# Patient Record
Sex: FEMALE | Race: WHITE | NOT HISPANIC OR LATINO | Employment: STUDENT | URBAN - METROPOLITAN AREA
[De-identification: names, ages, dates, MRNs, and addresses within clinical notes are randomized per-mention and may not be internally consistent; named-entity substitution may affect disease eponyms.]

---

## 2017-01-03 RX ORDER — DEXTROAMPHETAMINE SACCHARATE, AMPHETAMINE ASPARTATE MONOHYDRATE, DEXTROAMPHETAMINE SULFATE AND AMPHETAMINE SULFATE 5; 5; 5; 5 MG/1; MG/1; MG/1; MG/1
20 CAPSULE, EXTENDED RELEASE ORAL EVERY MORNING
Qty: 30 CAPSULE | Refills: 0 | OUTPATIENT
Start: 2017-01-03

## 2017-01-03 RX ORDER — DEXTROAMPHETAMINE SACCHARATE, AMPHETAMINE ASPARTATE MONOHYDRATE, DEXTROAMPHETAMINE SULFATE AND AMPHETAMINE SULFATE 2.5; 2.5; 2.5; 2.5 MG/1; MG/1; MG/1; MG/1
10 CAPSULE, EXTENDED RELEASE ORAL 2 TIMES DAILY
Qty: 90 CAPSULE | Refills: 0 | Status: SHIPPED | OUTPATIENT
Start: 2017-01-03 | End: 2017-02-07 | Stop reason: SDUPTHER

## 2017-01-03 NOTE — TELEPHONE ENCOUNTER
----- Message from Wendi Degroot sent at 1/3/2017  9:31 AM CST -----  Contact: Self  x  1st Request  _  2nd Request  _  3rd Request    Please refill the medication(s) listed below. Please call the patient when the prescription(s) is ready for  at the phone number [phone])([phone] .    Medication #1 ADDERALL       Preferred Pharmacy: [facility] at [phone]

## 2017-01-04 ENCOUNTER — TELEPHONE (OUTPATIENT)
Dept: INTERNAL MEDICINE | Facility: CLINIC | Age: 22
End: 2017-01-04

## 2017-01-04 NOTE — TELEPHONE ENCOUNTER
----- Message from Wendi Degroot sent at 1/4/2017  1:05 PM CST -----  Contact: Self  X   1st Request  _  2nd Request  _  3rd Request        Who: FABIOLA VALENCIA [[id number]]    Why: Pt states she is returning a call to the clinical team regarding her medications. Please call pt, thanks!    What Number to Call Back: [phone]    When to Expect a call back: (Before the end of the day)   -- if call after 3:00 call back will be tomorrow.    4:34 PM   Left message for patient returning phone call.

## 2017-01-05 ENCOUNTER — TELEPHONE (OUTPATIENT)
Dept: INTERNAL MEDICINE | Facility: CLINIC | Age: 22
End: 2017-01-05

## 2017-01-05 NOTE — TELEPHONE ENCOUNTER
----- Message from Maryann Vu sent at 1/5/2017  9:21 AM CST -----  Contact: FABIOLA VALENCIA [[id number]]  x_  1st Request  _  2nd Request  _  3rd Request    Please refill the medication(s) listed below. Please call the patient when the prescription(s) is ready for  at the phone number [phone]. Pharmacy says they haven't received medication.     Medication #1 dextroamphetamine-amphetamine (ADDERALL XR) 10 MG 24 hr capsule    Preferred Pharmacy:  [facility] [address] [place] [address] [address] [facility] [place] [address] [address]  [facility] [address]  Phone: [phone] Fax: [phone]

## 2017-01-13 ENCOUNTER — PATIENT MESSAGE (OUTPATIENT)
Dept: OBSTETRICS AND GYNECOLOGY | Facility: CLINIC | Age: 22
End: 2017-01-13

## 2017-01-13 ENCOUNTER — PATIENT MESSAGE (OUTPATIENT)
Dept: INTERNAL MEDICINE | Facility: CLINIC | Age: 22
End: 2017-01-13

## 2017-01-13 RX ORDER — FLUTICASONE PROPIONATE 50 MCG
1 SPRAY, SUSPENSION (ML) NASAL DAILY
Qty: 16 G | Refills: 11 | Status: SHIPPED | OUTPATIENT
Start: 2017-01-13 | End: 2017-05-09

## 2017-02-07 ENCOUNTER — PATIENT MESSAGE (OUTPATIENT)
Dept: OBSTETRICS AND GYNECOLOGY | Facility: CLINIC | Age: 22
End: 2017-02-07

## 2017-02-07 RX ORDER — DEXTROAMPHETAMINE SACCHARATE, AMPHETAMINE ASPARTATE MONOHYDRATE, DEXTROAMPHETAMINE SULFATE AND AMPHETAMINE SULFATE 2.5; 2.5; 2.5; 2.5 MG/1; MG/1; MG/1; MG/1
10 CAPSULE, EXTENDED RELEASE ORAL 2 TIMES DAILY
Qty: 90 CAPSULE | Refills: 0 | Status: SHIPPED | OUTPATIENT
Start: 2017-02-07 | End: 2017-02-08 | Stop reason: SDUPTHER

## 2017-02-08 RX ORDER — DEXTROAMPHETAMINE SACCHARATE, AMPHETAMINE ASPARTATE MONOHYDRATE, DEXTROAMPHETAMINE SULFATE AND AMPHETAMINE SULFATE 2.5; 2.5; 2.5; 2.5 MG/1; MG/1; MG/1; MG/1
10 CAPSULE, EXTENDED RELEASE ORAL 2 TIMES DAILY
Qty: 90 CAPSULE | Refills: 0 | Status: SHIPPED | OUTPATIENT
Start: 2017-02-08 | End: 2017-03-07 | Stop reason: SDUPTHER

## 2017-02-08 RX ORDER — DEXTROAMPHETAMINE SACCHARATE, AMPHETAMINE ASPARTATE MONOHYDRATE, DEXTROAMPHETAMINE SULFATE AND AMPHETAMINE SULFATE 2.5; 2.5; 2.5; 2.5 MG/1; MG/1; MG/1; MG/1
10 CAPSULE, EXTENDED RELEASE ORAL 2 TIMES DAILY
Qty: 90 CAPSULE | Refills: 0 | Status: SHIPPED | OUTPATIENT
Start: 2017-02-08 | End: 2017-02-08 | Stop reason: SDUPTHER

## 2017-02-20 ENCOUNTER — PATIENT MESSAGE (OUTPATIENT)
Dept: OBSTETRICS AND GYNECOLOGY | Facility: CLINIC | Age: 22
End: 2017-02-20

## 2017-03-06 ENCOUNTER — PATIENT MESSAGE (OUTPATIENT)
Dept: INTERNAL MEDICINE | Facility: CLINIC | Age: 22
End: 2017-03-06

## 2017-03-07 ENCOUNTER — PATIENT MESSAGE (OUTPATIENT)
Dept: OBSTETRICS AND GYNECOLOGY | Facility: CLINIC | Age: 22
End: 2017-03-07

## 2017-03-07 RX ORDER — DEXTROAMPHETAMINE SACCHARATE, AMPHETAMINE ASPARTATE MONOHYDRATE, DEXTROAMPHETAMINE SULFATE AND AMPHETAMINE SULFATE 2.5; 2.5; 2.5; 2.5 MG/1; MG/1; MG/1; MG/1
10 CAPSULE, EXTENDED RELEASE ORAL 2 TIMES DAILY
Qty: 90 CAPSULE | Refills: 0 | Status: CANCELLED | OUTPATIENT
Start: 2017-03-07

## 2017-03-07 RX ORDER — DEXTROAMPHETAMINE SACCHARATE, AMPHETAMINE ASPARTATE MONOHYDRATE, DEXTROAMPHETAMINE SULFATE AND AMPHETAMINE SULFATE 2.5; 2.5; 2.5; 2.5 MG/1; MG/1; MG/1; MG/1
10 CAPSULE, EXTENDED RELEASE ORAL 2 TIMES DAILY
Qty: 90 CAPSULE | Refills: 0 | Status: SHIPPED | OUTPATIENT
Start: 2017-03-07 | End: 2017-03-10 | Stop reason: SDUPTHER

## 2017-03-08 NOTE — TELEPHONE ENCOUNTER
Patient is requesting a refill on her birth control due to not liking the generic that was given. I informed her that we can not insure that the particular generic that she will be given is the one she is use to taking.

## 2017-03-09 ENCOUNTER — PATIENT MESSAGE (OUTPATIENT)
Dept: OBSTETRICS AND GYNECOLOGY | Facility: CLINIC | Age: 22
End: 2017-03-09

## 2017-03-10 RX ORDER — DEXTROAMPHETAMINE SACCHARATE, AMPHETAMINE ASPARTATE MONOHYDRATE, DEXTROAMPHETAMINE SULFATE AND AMPHETAMINE SULFATE 2.5; 2.5; 2.5; 2.5 MG/1; MG/1; MG/1; MG/1
10 CAPSULE, EXTENDED RELEASE ORAL 2 TIMES DAILY
Qty: 90 CAPSULE | Refills: 0 | Status: SHIPPED | OUTPATIENT
Start: 2017-03-10 | End: 2017-04-11 | Stop reason: SDUPTHER

## 2017-03-15 ENCOUNTER — HOSPITAL ENCOUNTER (OUTPATIENT)
Dept: RADIOLOGY | Facility: OTHER | Age: 22
Discharge: HOME | End: 2017-03-15
Attending: OTOLARYNGOLOGY
Payer: COMMERCIAL

## 2017-03-15 DIAGNOSIS — G43.009: ICD-10-CM

## 2017-03-15 PROCEDURE — 70486 CT MAXILLOFACIAL W/O DYE: CPT | Mod: TC

## 2017-03-15 PROCEDURE — 70486 CT MAXILLOFACIAL W/O DYE: CPT | Mod: 26,,, | Performed by: RADIOLOGY

## 2017-04-11 RX ORDER — AMOXICILLIN AND CLAVULANATE POTASSIUM 875; 125 MG/1; MG/1
1 TABLET, FILM COATED ORAL EVERY 12 HOURS
Refills: 0 | COMMUNITY
Start: 2017-02-17 | End: 2017-05-09

## 2017-04-11 RX ORDER — NAPROXEN 500 MG/1
TABLET ORAL
Refills: 1 | COMMUNITY
Start: 2017-02-13 | End: 2017-05-09

## 2017-04-11 RX ORDER — PREDNISONE 20 MG/1
TABLET ORAL
Refills: 0 | COMMUNITY
Start: 2017-02-17 | End: 2017-05-09

## 2017-04-11 RX ORDER — MONTELUKAST SODIUM 10 MG/1
TABLET ORAL
Refills: 1 | COMMUNITY
Start: 2017-02-23 | End: 2018-05-10

## 2017-04-12 RX ORDER — DEXTROAMPHETAMINE SACCHARATE, AMPHETAMINE ASPARTATE MONOHYDRATE, DEXTROAMPHETAMINE SULFATE AND AMPHETAMINE SULFATE 2.5; 2.5; 2.5; 2.5 MG/1; MG/1; MG/1; MG/1
10 CAPSULE, EXTENDED RELEASE ORAL 2 TIMES DAILY
Qty: 90 CAPSULE | Refills: 0 | Status: SHIPPED | OUTPATIENT
Start: 2017-04-12 | End: 2017-05-09

## 2017-04-25 RX ORDER — CETIRIZINE HYDROCHLORIDE 10 MG/1
TABLET ORAL
Refills: 5 | COMMUNITY
Start: 2017-03-21 | End: 2019-06-21

## 2017-05-09 ENCOUNTER — OFFICE VISIT (OUTPATIENT)
Dept: INTERNAL MEDICINE | Facility: CLINIC | Age: 22
End: 2017-05-09
Attending: FAMILY MEDICINE
Payer: COMMERCIAL

## 2017-05-09 ENCOUNTER — OFFICE VISIT (OUTPATIENT)
Dept: OBSTETRICS AND GYNECOLOGY | Facility: CLINIC | Age: 22
End: 2017-05-09
Attending: OBSTETRICS & GYNECOLOGY
Payer: COMMERCIAL

## 2017-05-09 VITALS
WEIGHT: 130.06 LBS | SYSTOLIC BLOOD PRESSURE: 124 MMHG | TEMPERATURE: 99 F | OXYGEN SATURATION: 100 % | BODY MASS INDEX: 20.41 KG/M2 | DIASTOLIC BLOOD PRESSURE: 68 MMHG | HEART RATE: 80 BPM | HEIGHT: 67 IN

## 2017-05-09 VITALS
HEIGHT: 67 IN | DIASTOLIC BLOOD PRESSURE: 82 MMHG | BODY MASS INDEX: 20.31 KG/M2 | SYSTOLIC BLOOD PRESSURE: 118 MMHG | WEIGHT: 129.44 LBS

## 2017-05-09 DIAGNOSIS — Z71.84 TRAVEL ADVICE ENCOUNTER: ICD-10-CM

## 2017-05-09 DIAGNOSIS — Z00.00 ANNUAL PHYSICAL EXAM: Primary | ICD-10-CM

## 2017-05-09 DIAGNOSIS — F98.8 ADD (ATTENTION DEFICIT DISORDER): ICD-10-CM

## 2017-05-09 DIAGNOSIS — Z30.41 ENCOUNTER FOR SURVEILLANCE OF CONTRACEPTIVE PILLS: ICD-10-CM

## 2017-05-09 DIAGNOSIS — Z01.419 WELL WOMAN EXAM: Primary | ICD-10-CM

## 2017-05-09 PROCEDURE — 99395 PREV VISIT EST AGE 18-39: CPT | Mod: S$GLB,,, | Performed by: OBSTETRICS & GYNECOLOGY

## 2017-05-09 PROCEDURE — 88175 CYTOPATH C/V AUTO FLUID REDO: CPT

## 2017-05-09 PROCEDURE — 99395 PREV VISIT EST AGE 18-39: CPT | Mod: S$GLB,,, | Performed by: FAMILY MEDICINE

## 2017-05-09 PROCEDURE — 99999 PR PBB SHADOW E&M-EST. PATIENT-LVL II: CPT | Mod: PBBFAC,,, | Performed by: OBSTETRICS & GYNECOLOGY

## 2017-05-09 PROCEDURE — 99999 PR PBB SHADOW E&M-EST. PATIENT-LVL III: CPT | Mod: PBBFAC,,, | Performed by: FAMILY MEDICINE

## 2017-05-09 RX ORDER — CIPROFLOXACIN 500 MG/1
500 TABLET ORAL 2 TIMES DAILY
Qty: 20 TABLET | Refills: 0 | Status: SHIPPED | OUTPATIENT
Start: 2017-05-09 | End: 2017-05-19

## 2017-05-09 RX ORDER — DEXMETHYLPHENIDATE HYDROCHLORIDE 30 MG/1
30 CAPSULE, EXTENDED RELEASE ORAL DAILY
Qty: 30 CAPSULE | Refills: 0 | Status: SHIPPED | OUTPATIENT
Start: 2017-05-09 | End: 2017-05-11 | Stop reason: SDUPTHER

## 2017-05-09 RX ORDER — BUTALBITAL, ASPIRIN, AND CAFFEINE 325; 50; 40 MG/1; MG/1; MG/1
1 CAPSULE ORAL EVERY 6 HOURS PRN
Qty: 30 CAPSULE | Refills: 0 | Status: SHIPPED | OUTPATIENT
Start: 2017-05-09 | End: 2017-06-08

## 2017-05-09 RX ORDER — LANOLIN ALCOHOL/MO/W.PET/CERES
400 CREAM (GRAM) TOPICAL NIGHTLY
Qty: 90 TABLET | Refills: 1 | Status: SHIPPED | OUTPATIENT
Start: 2017-05-09 | End: 2018-05-10

## 2017-05-09 RX ORDER — FLUCONAZOLE 150 MG/1
150 TABLET ORAL ONCE
Qty: 2 TABLET | Refills: 0 | Status: SHIPPED | OUTPATIENT
Start: 2017-05-09 | End: 2017-05-09

## 2017-05-09 NOTE — MR AVS SNAPSHOT
[facility] [address]  [address]  [facility] [address]  Phone: [phone]  Fax: [phone]                  Melinda Mccann   2017 9:30 AM   Office Visit    Description:  Female : 1995   Provider:  Marleni Armas DO   Department:  [facility] [address]           Reason for Visit     Well Woman                To Do List           Future Appointments        Provider Department Dept Phone    2017 11:00 AM Kenia Greco MD [facility] Internal Medicine [phone]      Goals (5 Years of Data)     None      [facility]     [facility] Nurse Care Line -  Assistance  Unless otherwise directed by your provider, please contact [facility], our nurse care line that is available for  assistance.     Registered nurses in the [facility] Call Center provide: appointment scheduling, clinical advisement, health education, and other advisory services.  Call: [phone] (toll free)               Medications           Message regarding Medications     Verify the changes and/or additions to your medication regime listed below are the same as discussed with your clinician today.  If any of these changes or additions are incorrect, please notify your healthcare provider.        STOP taking these medications     amoxicillin-clavulanate 875-125mg (AUGMENTIN) 875-125 mg per tablet Take 1 tablet by mouth every 12 (twelve) hours.    fluticasone (FLONASE) 50 mcg/actuation nasal spray 1 spray by Each Nare route once daily.    naproxen (NAPROSYN) 500 MG tablet TAKE ONE TABLET BY MOUTH TWICE DAILY AS NEEDED FOR PAIN OR HEADACHE    oxycodone-acetaminophen (PERCOCET) 5-325 mg per tablet Take 1 tablet by mouth every 4 to 6 hours as needed for Pain.    predniSONE (DELTASONE) 20 MG tablet TAKE 1 TABLET BY MOUTH TWICE A DAY FOR 2 DAYS, THEN 1/2 TABLET TWICE A DAY FOR 2 DAYS, THEN 1/2 TABLET DAILY FOR 2 DAYS. TAKE WITH MEALS.    sulfacetamide sodium 10% (BLEPH-10)  "10 % ophthalmic solution            Verify that the below list of medications is an accurate representation of the medications you are currently taking.  If none reported, the list may be blank. If incorrect, please contact your healthcare provider. Carry this list with you in case of emergency.           Current Medications     cetirizine (ZYRTEC) 10 MG tablet TK 1 T PO QD    dextroamphetamine-amphetamine (ADDERALL XR) 10 MG 24 hr capsule Take 1 capsule (10 mg total) by mouth 2 (two) times daily. Two tablets in am and 1 in the afternoon    montelukast (SINGULAIR) 10 mg tablet TK 1 T PO QD    norethindrone-e.estradiol-iron 1 mg-20 mcg (24)/75 mg (4) Oral per tablet Take 1 tablet by mouth once daily.    sumatriptan (IMITREX) 25 MG Tab Take 1 tablet (25 mg total) by mouth every 8 (eight) hours as needed.           Clinical Reference Information           Your Vitals Were     BP Height Weight Last Period BMI    118/82 5' 7" (1.702 m) 58.7 kg (129 lb 6.6 oz) 05/03/2017 20.27 kg/m2      Blood Pressure          Most Recent Value    BP  118/82      Allergies as of 5/9/2017     No Known Allergies      Immunizations Administered on Date of Encounter - 5/9/2017     None      Language Assistance Services     ATTENTION: Language assistance services are available, free of charge. Please call [phone].      ATENCIÓN: Si habla shannan, tiene a hastings disposición servicios gratuitos de asistencia lingüística. Llame al [phone].     CHÚ Ý: N?u b?n nói Ti?ng Vi?t, có các d?ch v? h? tr? ngôn ng? mi?n phí dành cho b?n. G?i s? [phone].         [facility] [address] complies with applicable Federal civil rights laws and does not discriminate on the basis of race, color, national origin, age, disability, or sex.        "

## 2017-05-09 NOTE — PROGRESS NOTES
"CC: Well woman exam    Melinda Mccann is a 22 y.o. female  presents for well woman exam.  LMP: Patient's last menstrual period was 2017..  No issues, problems, or complaints.  Currently using loestrin, without problems, still planning to apply to [employer] school.  Going to [place] for study abroad this summer, discussed ppx abx and personal safety.  Discussed pap this year, has had gardasil, declines STD testing at this time.    Past Medical History:   Diagnosis Date    Accessory navicular bone of foot     right    ADD (attention deficit disorder)     Cancer     Dysplastic nevus     Still's murmur      Past Surgical History:   Procedure Laterality Date    COLONOSCOPY N/A 2016    Procedure: COLONOSCOPY;  Surgeon: ROSY Ferguson MD;  Location: [address];  Service: Endoscopy;  Laterality: N/A;    WISDOM TOOTH EXTRACTION       Social History     Social History    Marital status: Single     Spouse name: N/A    Number of children: N/A    Years of education: N/A     Occupational History    hospital scribe      Social History Main Topics    Smoking status: Never Smoker    Smokeless tobacco: Never Used    Alcohol use 0.0 oz/week     0 Standard drinks or equivalent per week    Drug use: No    Sexual activity: Yes     Partners: Male     Birth control/ protection: Condom, OCP     Other Topics Concern    Are You Pregnant Or Think You May Be? No    Breast-Feeding No     Social History Narrative     Family History   Problem Relation Age of Onset    Mitral valve prolapse Mother     Stroke Maternal Aunt      maternal great aunt    Breast cancer Neg Hx     Colon cancer Neg Hx     Ovarian cancer Neg Hx     Diabetes Neg Hx     Hypertension Neg Hx     Melanoma Neg Hx      OB History      Para Term  AB TAB SAB Ectopic Multiple Living    0 0 0 0 0 0 0 0 0 0          /82  Ht 5' 7" (1.702 m)  Wt 58.7 kg (129 lb 6.6 oz)  LMP 2017  BMI 20.27 " kg/m2      ROS:  GENERAL: Denies weight gain or weight loss. Feeling well overall.   SKIN: Denies rash or lesions.   HEAD: Denies head injury or headache.   NODES: Denies enlarged lymph nodes.   CHEST: Denies chest pain or shortness of breath.   CARDIOVASCULAR: Denies palpitations or left sided chest pain.   ABDOMEN: No abdominal pain, constipation, diarrhea, nausea, vomiting or rectal bleeding.   URINARY: No frequency, dysuria, hematuria, or burning on urination.  REPRODUCTIVE: See HPI.   BREASTS: The patient performs breast self-examination and denies pain, lumps, or nipple discharge.   HEMATOLOGIC: No easy bruisability or excessive bleeding.   MUSCULOSKELETAL: Denies joint pain or swelling.   NEUROLOGIC: Denies syncope or weakness.   PSYCHIATRIC: Denies depression, anxiety or mood swings.    PHYSICAL EXAM:  APPEARANCE: Well nourished, well developed, in no acute distress.  AFFECT: WNL, alert and oriented x 3  SKIN: No acne or hirsutism  NECK: Neck symmetric without masses or thyromegaly  NODES: No inguinal, cervical, axillary, or femoral lymph node enlargement  CHEST: Good respiratory effect  ABDOMEN: Soft.  No tenderness or masses.  No hepatosplenomegaly.  No hernias.  BREASTS: Symmetrical, no skin changes or visible lesions.  No palpable masses, nipple discharge bilaterally.  PELVIC: Normal external genitalia without lesions.  Normal hair distribution.  Adequate perineal body, normal urethral meatus.  Vagina moist and well rugated without lesions or discharge.  Cervix pink, without lesions, discharge or tenderness.  No significant cystocele or rectocele.  Bimanual exam shows uterus to be normal size, regular, mobile and nontender.  Adnexa without masses or tenderness.    EXTREMITIES: No edema.    Well woman exam  -     Liquid-based pap smear, screening    Encounter for surveillance of contraceptive pills  -     norethindrone-e.estradiol-iron 1 mg-20 mcg (24)/75 mg (4) Oral per tablet; Take 1 tablet by mouth  once daily.  Dispense: 60 tablet; Refill: 11    Travel advice encounter  -     fluconazole (DIFLUCAN) 150 MG Tab; Take 1 tablet (150 mg total) by mouth once.  Dispense: 2 tablet; Refill: 0  -     ciprofloxacin HCl (CIPRO) 500 MG tablet; Take 1 tablet (500 mg total) by mouth 2 (two) times daily.  Dispense: 20 tablet; Refill: 0            Patient was counseled today on A.C.S. Pap guidelines and recommendations for yearly pelvic exams, mammograms and monthly self breast exams; to see her PCP for other health maintenance.     No Follow-up on file.

## 2017-05-09 NOTE — PROGRESS NOTES
"Subjective:      Patient ID: Melinda Mccann is a 22 y.o. female.    Chief Complaint: Annual Exam    HPI Comments: She will start allergy shots in 3-4 months. Her adderall is not keeping her even throughout the day with concentration. She is having headaches with her allergies. She has not been eating as much. In the last few weeks mood has been good, anxiety level ok, no panic attacks.     Review of Systems   Constitutional: Negative.    Respiratory: Negative.    Cardiovascular: Negative.    Gastrointestinal: Negative.    Genitourinary: Negative.    Neurological: Negative.      I personally reviewed Past Medical History, Past Surgical history,  Past Social History and Family History    Objective:   /68  Pulse 80  Temp 98.7 °F (37.1 °C) (Oral)   Ht 5' 7" (1.702 m)  Wt 59 kg (130 lb 1.1 oz)  LMP 05/03/2017  SpO2 100%  BMI 20.37 kg/m2    Physical Exam   Constitutional: She is oriented to person, place, and time. She appears well-developed and well-nourished. No distress.   HENT:   Head: Normocephalic.   Right Ear: External ear normal.   Left Ear: External ear normal.   Mouth/Throat: Oropharynx is clear and moist.   Eyes: Conjunctivae and EOM are normal. Pupils are equal, round, and reactive to light. Right eye exhibits no discharge. Left eye exhibits no discharge. No scleral icterus.   Neck: Normal range of motion. No tracheal deviation present. No thyromegaly present.   Cardiovascular: Normal rate, regular rhythm, normal heart sounds and intact distal pulses.  Exam reveals no gallop.    No murmur heard.  Pulmonary/Chest: Effort normal and breath sounds normal. No respiratory distress. She has no wheezes. She has no rales. She exhibits no tenderness.   Abdominal: Soft. Bowel sounds are normal. She exhibits no distension and no mass. There is no tenderness. There is no rebound and no guarding.   Musculoskeletal: Normal range of motion.   Neurological: She is alert and oriented to person, place, and " time. No cranial nerve deficit.   Skin: Skin is warm and dry.   Psychiatric: She has a normal mood and affect. Her behavior is normal. Judgment and thought content normal.   Vitals reviewed.      Melinda was seen today for annual exam.    Diagnoses and all orders for this visit:    Annual physical exam  - reviewed labs, no repeats are needed at this gabriela stormy    ADD (attention deficit disorder)  -will change to focalin, recommend patient call her insurance to determine what medication are on formulary if this is not covered     Travel advice encounter  -     typhoid (VIVOTIF) DR capsule; Take 1 capsule by mouth every other day.    Headaches  -will start magnesium and trial fiornal prn till allergy shots are started   -patient to call with any changes      -     Cancel: Ambulatory referral to Obstetrics / Gynecology  -     Discontinue: dexmethylphenidate (FOCALIN XR) 30 mg 24 hr capsule; Take 30 mg by mouth once daily.  -     butalbital-aspirin-caffeine -40 mg (FIORINAL) -40 mg Cap; Take 1 capsule by mouth every 6 (six) hours as needed (headaches).  -     magnesium oxide (MAG-OX) 400 mg tablet; Take 1 tablet (400 mg total) by mouth every evening.

## 2017-05-09 NOTE — MR AVS SNAPSHOT
[facility]  [address]  [facility] [address]  Phone: [phone]  Fax: [phone]                  Melinda Mccann   2017 11:00 AM   Office Visit    Description:  Female : 1995   Provider:  Kenia Greco MD   Department:  [facility]           Reason for Visit     Annual Exam           Diagnoses this Visit        Comments    Annual physical exam    -  Primary     ADD (attention deficit disorder)                To Do List           Goals (5 Years of Data)     None       These Medications        Disp Refills Start End    dexmethylphenidate (FOCALIN XR) 30 mg 24 hr capsule 30 capsule 0 2017     Take 30 mg by mouth once daily. - Oral    Pharmacy: [facility] [id number] [place] [address] [address] AT [facility] Ph #: [phone]       butalbital-aspirin-caffeine -40 mg (FIORINAL) -40 mg Cap 30 capsule 0 2017    Take 1 capsule by mouth every 6 (six) hours as needed (headaches). - Oral    Pharmacy: [facility] [id number] [place] [address] [address] AT [facility] Ph #: [phone]         [facility]     [facility] Care [facility] -  Assistance  Unless otherwise directed by your provider, please contact [facility], our nurse care line that is available for  assistance.     Registered nurses in the [facility] Center provide: appointment scheduling, clinical advisement, health education, and other advisory services.  Call: [phone] (toll free)               Medications           Message regarding Medications     Verify the changes and/or additions to your medication regime listed below are the same as discussed with your clinician today.  If any of these changes or additions are incorrect, please notify your healthcare provider.        START taking these NEW medications        Refills    dexmethylphenidate  "(FOCALIN XR) 30 mg 24 hr capsule 0    Sig: Take 30 mg by mouth once daily.    Class: Normal    Route: Oral    butalbital-aspirin-caffeine -40 mg (FIORINAL) -40 mg Cap 0    Sig: Take 1 capsule by mouth every 6 (six) hours as needed (headaches).    Class: Normal    Route: Oral      STOP taking these medications     dextroamphetamine-amphetamine (ADDERALL XR) 10 MG 24 hr capsule Take 1 capsule (10 mg total) by mouth 2 (two) times daily. Two tablets in am and 1 in the afternoon           Verify that the below list of medications is an accurate representation of the medications you are currently taking.  If none reported, the list may be blank. If incorrect, please contact your healthcare provider. Carry this list with you in case of emergency.           Current Medications     cetirizine (ZYRTEC) 10 MG tablet TK 1 T PO QD    montelukast (SINGULAIR) 10 mg tablet TK 1 T PO QD    norethindrone-e.estradiol-iron 1 mg-20 mcg (24)/75 mg (4) Oral per tablet Take 1 tablet by mouth once daily.    butalbital-aspirin-caffeine -40 mg (FIORINAL) -40 mg Cap Take 1 capsule by mouth every 6 (six) hours as needed (headaches).    ciprofloxacin HCl (CIPRO) 500 MG tablet Take 1 tablet (500 mg total) by mouth 2 (two) times daily.    dexmethylphenidate (FOCALIN XR) 30 mg 24 hr capsule Take 30 mg by mouth once daily.    fluconazole (DIFLUCAN) 150 MG Tab Take 1 tablet (150 mg total) by mouth once.    sumatriptan (IMITREX) 25 MG Tab Take 1 tablet (25 mg total) by mouth every 8 (eight) hours as needed.           Clinical Reference Information           Your Vitals Were     BP Pulse Temp Height Weight Last Period    124/68 80 98.7 °F (37.1 °C) (Oral) 5' 7" (1.702 m) 59 kg (130 lb 1.1 oz) 05/03/2017    SpO2 BMI             100% 20.37 kg/m2         Blood Pressure          Most Recent Value    BP  124/68      Allergies as of 5/9/2017     No Known Allergies      Immunizations Administered on Date of Encounter - 5/9/2017     " None      Language Assistance Services     ATTENTION: Language assistance services are available, free of charge. Please call [phone].      ATENCIÓN: Si habla shannan, tiene a hastings disposición servicios gratuitos de asistencia lingüística. Llame al [phone].     CHÚ Ý: N?u b?n nói Ti?ng Vi?t, có các d?ch v? h? tr? ngôn ng? mi?n phí dành cho b?n. G?i s? [phone].         [facility] complies with applicable Federal civil rights laws and does not discriminate on the basis of race, color, national origin, age, disability, or sex.

## 2017-05-11 RX ORDER — DEXMETHYLPHENIDATE HYDROCHLORIDE 30 MG/1
30 CAPSULE, EXTENDED RELEASE ORAL DAILY
Qty: 30 CAPSULE | Refills: 0 | Status: SHIPPED | OUTPATIENT
Start: 2017-05-11 | End: 2017-05-15

## 2017-05-11 RX ORDER — DEXMETHYLPHENIDATE HYDROCHLORIDE 30 MG/1
30 CAPSULE, EXTENDED RELEASE ORAL DAILY
Qty: 30 CAPSULE | Refills: 0 | Status: SHIPPED | OUTPATIENT
Start: 2017-05-11 | End: 2017-05-11 | Stop reason: SDUPTHER

## 2017-05-11 NOTE — TELEPHONE ENCOUNTER
Comment from pt:    Comment:   The [facility] on [address] had the medication on back order as well. I [facility] ahead this time [facility] a [facility] [address], and they have assured me that they have the medicine in stock in generic form. Would you mind sending it there? I'm so sorry for the confusion. Thank you!

## 2017-05-12 ENCOUNTER — NURSE TRIAGE (OUTPATIENT)
Dept: ADMINISTRATIVE | Facility: CLINIC | Age: 22
End: 2017-05-12

## 2017-05-12 NOTE — TELEPHONE ENCOUNTER
Pt having trouble with picking up dexmethylphenidate as the prior authorization is taking longer than expected and pt is leaving the country Monday morning. Pt is requesting to have adderall filled as her insurance will cover, until she is able to have the dexmethylphenidate filled. Please advise and authorize.

## 2017-05-12 NOTE — TELEPHONE ENCOUNTER
Reason for Disposition   General information question, no triage required and triager able to answer question    Protocols used: ST INFORMATION ONLY CALL-A-AH    Melinda called about her Focalin.  She states the insurance company told her their approval for the prior authorization may not be completed until Monday.  Melinda is going to leave [place] tomorrow to study abroad for the summer. She wants to know if she can just get Adderall instead.  Explained to her that Kenia Greco MD discontinued the Adderall yesterday, and sent the Focalin order to [facility] instead of the Adderall.  [facility] did receive the order at 1446 on 05/11/17.  Explained to her that the prior authorization is needed for insurance to pay for the medication, but if she is willing to pay for it, she can go get it tonight.  They do not keep medication from you if you don't have prior auth, they ([facility] just needs to know you will pay for it out of pocket.  She states understanding.  She said she will call [employer] to see if they will reimburse if she pays for it now.  Will call back for any additional concerns. Message to Kenia Greco MD . Please contact caller directly with any additional care advice.

## 2017-05-12 NOTE — TELEPHONE ENCOUNTER
----- Message from Neelima Vaughn sent at 5/12/2017  3:36 PM CDT -----  Contact: Pt  X_  1st Request  _  2nd Request  _  3rd Request    Who:FABIOLA VALENCIA [[id number]]    Why: Patient would like to speak with staff to check the status of her prior authorization    What Number to Call Back: [phone]    When to Expect a call back: (Before the end of the day)   -- if call after 3:00 call back will be tomorrow.    GUMARO

## 2017-05-15 ENCOUNTER — TELEPHONE (OUTPATIENT)
Dept: INTERNAL MEDICINE | Facility: CLINIC | Age: 22
End: 2017-05-15

## 2017-05-15 RX ORDER — DEXTROAMPHETAMINE SACCHARATE, AMPHETAMINE ASPARTATE MONOHYDRATE, DEXTROAMPHETAMINE SULFATE AND AMPHETAMINE SULFATE 5; 5; 5; 5 MG/1; MG/1; MG/1; MG/1
20 CAPSULE, EXTENDED RELEASE ORAL EVERY MORNING
Qty: 30 CAPSULE | Refills: 0 | OUTPATIENT
Start: 2017-05-15

## 2017-05-15 RX ORDER — DEXTROAMPHETAMINE SACCHARATE, AMPHETAMINE ASPARTATE MONOHYDRATE, DEXTROAMPHETAMINE SULFATE AND AMPHETAMINE SULFATE 5; 5; 5; 5 MG/1; MG/1; MG/1; MG/1
20 CAPSULE, EXTENDED RELEASE ORAL EVERY MORNING
Qty: 30 CAPSULE | Refills: 0 | Status: SHIPPED | OUTPATIENT
Start: 2017-05-15 | End: 2017-05-15

## 2017-05-15 RX ORDER — DEXTROAMPHETAMINE SACCHARATE, AMPHETAMINE ASPARTATE MONOHYDRATE, DEXTROAMPHETAMINE SULFATE AND AMPHETAMINE SULFATE 2.5; 2.5; 2.5; 2.5 MG/1; MG/1; MG/1; MG/1
10 CAPSULE, EXTENDED RELEASE ORAL 2 TIMES DAILY
Qty: 90 CAPSULE | Refills: 0 | Status: SHIPPED | OUTPATIENT
Start: 2017-05-15 | End: 2017-06-05

## 2017-05-15 NOTE — TELEPHONE ENCOUNTER
Informed Pt's mother ezequiel that fax has been sent, and that I personally called [employer] and verified that it had been successfully sent.

## 2017-05-15 NOTE — TELEPHONE ENCOUNTER
"----- Message from Florencia Jaramillo sent at 5/15/2017 12:02 PM CDT -----  Contact: Patient's mother / Mignon Mccann / # [phone]  _  1st Request  X  2nd Request  _  3rd Request    Who: Melinda Mccann (mrn# [id number])    Why:  Patient's mother called requesting a call.  Says, "[employer] faxed over to this office a prior authorization along with a survey to be completed and faxed right back."  Please give patient's mother a call back at your earliest convenience confirming this has been done.    THANKS!    What Number to Call Back: [phone]    When to Expect a call back: (Before the end of the day)   -- if the call is after 12:00, the call back will be tomorrow.    2:14 PM  Informed patient's mother that fax was sent for patient's ADD medication FOCALIN                      "

## 2017-05-15 NOTE — TELEPHONE ENCOUNTER
"----- Message from Florencia Jaramillo sent at 5/15/2017 12:02 PM CDT -----  Contact: Patient's mother / Mignon Mccann / # [phone]  _  1st Request  X  2nd Request  _  3rd Request    Who: Melinda Mccann (mrn# [id number])    Why:  Patient's mother called requesting a call.  Says, "[employer] faxed over to this office a prior authorization along with a survey to be completed and faxed right back."  Please give patient's mother a call back at your earliest convenience confirming this has been done.    THANKS!    What Number to Call Back: [phone]    When to Expect a call back: (Before the end of the day)   -- if the call is after 12:00, the call back will be tomorrow.                        "

## 2017-05-15 NOTE — TELEPHONE ENCOUNTER
Contacted [employer]. They have received prior authorization. It has been marked as urgent and will be processed within 24 hours.

## 2017-05-15 NOTE — TELEPHONE ENCOUNTER
----- Message from Susanne Hill sent at 5/15/2017  1:10 PM CDT -----  Contact: stacey [facility] [phone] ext [phone]  _  1st Request  _  2nd Request  _  3rd Request        Who: stacey [facility] [phone] ext [phone]    Why: following up with a fax that was sent on Friday. Please call stacey [facility] [phone] ext [phone]    What Number to Call Back:stacey [facility] [phone] ext [phone]    When to Expect a call back: (Before the end of the day)   -- if the call is after 12:00, the call back will be tomorrow.      2:05 PM  Attempted to contact Stacey with [employer] but was informed that the wrong number was given. Refaxed patient's Focalin PA paperwork with correct information.

## 2017-05-15 NOTE — TELEPHONE ENCOUNTER
Pt mother, ezequiel, stated that because of the prior authorization not being completed before daughter leaving the country they had to pay out of pocket. Fax has been sent for completion to [employer] as requested by pt's mother so that she may be reimbursed.

## 2017-05-16 ENCOUNTER — TELEPHONE (OUTPATIENT)
Dept: INTERNAL MEDICINE | Facility: CLINIC | Age: 22
End: 2017-05-16

## 2017-05-16 NOTE — TELEPHONE ENCOUNTER
----- Message from Susanne Hill sent at 5/16/2017 12:06 PM CDT -----  Contact: pt mom ezequiel [phone]  _  1st Request  _  2nd Request  _  3rd Request        Who: pt mom ezequiel [phone]    Why: regarding a peer to peer for dexmethylphenidate xl 30 mg. Please call the mom    What Number to Call Back:pt mom ezequiel cell [phone]    When to Expect a call back: (Before the end of the day)   -- if the call is after 12:00, the call back will be tomorrow.      3:59 PM  Left vm returning patient's mother phone call.              \

## 2017-05-16 NOTE — TELEPHONE ENCOUNTER
----- Message from Susanne Hill sent at 5/16/2017  4:54 PM CDT -----  Contact: pt's mom ezequiel   _  1st Request  _  2nd Request  _  3rd Request        Who: pt's mom ezequiel     Why: pt returned the nurse's phone call    What Number to Call Back:[phone]    When to Expect a call back: (Before the end of the day)   -- if the call is after 12:00, the call back will be tomorrow.    4:54pm  Spoke to patient's parent regarding the denial of patients FOCALIN PA and Dr. Greco's rejection to do a [id number] based on the lack of knowledge of patient's previous mental health history. Informed patient's parent that Dr. Greco recommends calling their insurance company to see which ADD medication they will approve. Parent's verbalized understanding and had no further questions.

## 2017-06-02 ENCOUNTER — PATIENT MESSAGE (OUTPATIENT)
Dept: INTERNAL MEDICINE | Facility: CLINIC | Age: 22
End: 2017-06-02

## 2017-06-07 ENCOUNTER — TELEPHONE (OUTPATIENT)
Dept: INTERNAL MEDICINE | Facility: CLINIC | Age: 22
End: 2017-06-07

## 2017-06-07 DIAGNOSIS — F98.8 ADD (ATTENTION DEFICIT DISORDER): Primary | ICD-10-CM

## 2017-06-07 RX ORDER — METHYLPHENIDATE HYDROCHLORIDE EXTENDED RELEASE 20 MG/1
20 TABLET ORAL 2 TIMES DAILY
Qty: 60 TABLET | Refills: 0 | Status: SHIPPED | OUTPATIENT
Start: 2017-06-07 | End: 2017-07-26 | Stop reason: ALTCHOICE

## 2017-06-07 NOTE — TELEPHONE ENCOUNTER
Please inform methylphenidate has been sent to pharmacy   Please inform she call to make a psychiatry appointment for further management/evaluation for ADD, appointments are booking far out and we will need this follow up today if this medication is ineffective    Call to make an appointment within [phone]  Neelima Friedman(psychiatrist) [address] Phone: [phone]  Celso Albrecht (psychiatrist) [phone] [address] [facility]

## 2017-06-09 NOTE — TELEPHONE ENCOUNTER
Pt originally was prescribed adderall. Pt's insurance did not approve the rx even with a prior authorization and suggested methylphenidate ER or Vyvanse. Pt was then prescribed methylphenidate but was unable to fill the rx as the pharmacies the pt tried to have it filled at did not carry the medication and could not tell pt when they would be getting a new shipment in. Pt requesting a rx for Vyvanse. Please advise and authorize.

## 2017-06-13 ENCOUNTER — TELEPHONE (OUTPATIENT)
Dept: INTERNAL MEDICINE | Facility: CLINIC | Age: 22
End: 2017-06-13

## 2017-06-13 ENCOUNTER — PATIENT MESSAGE (OUTPATIENT)
Dept: INTERNAL MEDICINE | Facility: CLINIC | Age: 22
End: 2017-06-13

## 2017-06-13 NOTE — TELEPHONE ENCOUNTER
----- Message from Laquita Marquez sent at 6/12/2017  4:02 PM CDT -----  x_  1st Request  _  2nd Request  _  3rd Request        Who: FABIOLA VALENCIA [[id number]]    Why: Pt called she need to speak to someone in reference to her medication, the pharmacy is stating that they didn't receive the refill request. Please call her as soon as possible.     What Number to Call Back: [phone]    When to Expect a call back: (Before the end of the day)   -- if the call is after 12:00, the call back will be tomorrow.

## 2017-06-13 NOTE — TELEPHONE ENCOUNTER
----- Message from Laquita Marquez sent at 6/12/2017  4:02 PM CDT -----  x_  1st Request  _  2nd Request  _  3rd Request        Who: FABIOLA VALENCIA [[id number]]    Why: Pt called she need to speak to someone in reference to her medication, the pharmacy is stating that they didn't receive the refill request. Please call her as soon as possible.     What Number to Call Back: [phone]    When to Expect a call back: (Before the end of the day)   -- if the call is after 12:00, the call back will be tomorrow.

## 2017-06-13 NOTE — TELEPHONE ENCOUNTER
Talked to pt and pt was confused as to if she was suppose to have gotten a prescription for vyvanse or if she was suppose to schedule an appt with psychiatry to get the medication. I hung up with patient and looked through her encounters and saw there was a referral in place and that dr. sun suggested she make an appt with specialist to discuss medication. Left message for pt to call back so i could assist her to make appt.

## 2017-06-29 ENCOUNTER — INITIAL CONSULT (OUTPATIENT)
Dept: PSYCHIATRY | Facility: CLINIC | Age: 22
End: 2017-06-29
Payer: COMMERCIAL

## 2017-06-29 VITALS
HEART RATE: 107 BPM | SYSTOLIC BLOOD PRESSURE: 115 MMHG | WEIGHT: 126 LBS | HEIGHT: 67 IN | DIASTOLIC BLOOD PRESSURE: 78 MMHG | BODY MASS INDEX: 19.78 KG/M2

## 2017-06-29 DIAGNOSIS — F90.0 ADHD (ATTENTION DEFICIT HYPERACTIVITY DISORDER), INATTENTIVE TYPE: Primary | ICD-10-CM

## 2017-06-29 PROCEDURE — 99999 PR PBB SHADOW E&M-EST. PATIENT-LVL III: CPT | Mod: PBBFAC,,,

## 2017-06-29 PROCEDURE — 90792 PSYCH DIAG EVAL W/MED SRVCS: CPT | Mod: S$GLB,,,

## 2017-06-29 NOTE — PROGRESS NOTES
"Outpatient Psychiatry Initial Visit (MD/NP)    6/29/2017    Melinda Mccann, a 22 y.o. female, presenting for initial evaluation visit. Met with patient.    Reason for Encounter: Kenia Greco MD Patient complains of   Chief Complaint   Patient presents with    ADHD     History of Present Illness: Patient was diagnosed with ADHD via psych testing in 2015 (results are scanned into Epic under "media" tab in chart review and were reviewed by this provider as part of this encounter), presents to transfer care to psychiatry from primary care doctor. Past trials for ADHD include adderall XR and adderall immediate release, she is currently taking vyvanse 40mg with good effect. She denies any side effects from vyvanse (denies insomnia, anxiety, decreased appetite, irritability, headaches), taking it only during the week unless she has exams. Reports grades have improved on stimulants but are still not where she wants them to be; she has worked with the resource center at her college on strategies for studying and reports this has also been effective. She reports a history of panic attacks during the end of high school but they resolved in the absence of stressors and they have not occurred since then. No history of depression, generalized anxiety disorder, elaine or hypomania, auditory or visual hallucinations, paranoid ideation, suicidal or homicidal ideation.     Substance Abuse: uses alcohol 1-3 drinks every few weeks, no drug or tobacco use    Trauma History: Denies hx of physical or sexual abuse    Social History: raised by mother and father, has two brothers ages 23 and 25. Mother is a homemaker, father is an . Attended [employer] for high school, currently attending the [employer], going into her senior year, majoring in [place]ian with minors in biology, psychology, and chemistry. She is pre-med, applying to medical schools for fall 2018, she hopes to attend \[employer] [place] [employer]\"". Close group of friends " "from both high school and college, lives off-campus with three roommates. Single, straight, sexually active, using protection (condoms and OCP), no history of pregnancy.     Medical History: Migraines about once every two weeks. Patient denies history of seizures or head trauma/loss of consciousness.     Family Psychiatric History: 25 year old brother with ADHD and anxiety, 23 year old brother with depression    Review Of Systems:     Review of Systems   Constitutional: Negative for appetite change, chills, diaphoresis and fever.   HENT: Negative for congestion.    Eyes: Negative for pain.   Respiratory: Negative for cough.    Cardiovascular: Negative for chest pain and palpitations.   Gastrointestinal: Negative for abdominal pain.   Endocrine: Negative for cold intolerance and heat intolerance.   Genitourinary: Negative for difficulty urinating.   Musculoskeletal: Negative for back pain and gait problem.   Skin: Negative for color change, rash and wound.   Allergic/Immunologic: Negative for immunocompromised state.   Neurological: Negative for dizziness, tremors, syncope, weakness and numbness.   Hematological: Negative for adenopathy.   Psychiatric/Behavioral:        Pertinent items are noted in HPI       Current Evaluation:     Constitutional  Vitals:  Most recent vital signs, dated less than 90 days prior to this appointment, were reviewed.    Vitals:    06/29/17 1443   BP: 115/78   Pulse: 107   Weight: 57.2 kg (126 lb)   Height: 5' 7" (1.702 m)        General:  unremarkable, age appropriate, casually dressed, neatly groomed, thin     Musculoskeletal  Muscle Strength/Tone:  not examined   Gait & Station:  non-ataxic     Psychiatric  Speech:  no latency; no press   Mood & Affect:  steady, happy  congruent and appropriate   Thought Process:  normal and logical   Associations:  intact   Thought Content:  normal, no suicidality, no homicidality, delusions, or paranoia   Insight:  intact, has awareness of illness "   Judgement: behavior is adequate to circumstances   Orientation:  grossly intact   Memory: intact for content of interview   Language: grossly intact   Attention Span & Concentration:  able to focus   Fund of Knowledge:  intact and appropriate to age and level of education     Laboratory Data  No visits with results within 1 Month(s) from this visit.   Latest known visit with results is:   Admission on 05/17/2016, Discharged on 05/17/2016   Component Date Value Ref Range Status    POC Preg Test, Ur 05/17/2016 Negative  Negative Final     Acceptable 05/17/2016 Yes   Final       Medications  Outpatient Encounter Prescriptions as of 6/29/2017   Medication Sig Dispense Refill    cetirizine (ZYRTEC) 10 MG tablet TK 1 T PO QD  5    magnesium oxide (MAG-OX) 400 mg tablet Take 1 tablet (400 mg total) by mouth every evening. 90 tablet 1    methylphenidate (METADATE ER) 20 MG TbSR Take 1 tablet (20 mg total) by mouth 2 (two) times daily. 60 tablet 0    montelukast (SINGULAIR) 10 mg tablet TK 1 T PO QD  1    norethindrone-e.estradiol-iron 1 mg-20 mcg (24)/75 mg (4) Oral per tablet Take 1 tablet by mouth once daily. 60 tablet 11    sumatriptan (IMITREX) 25 MG Tab Take 1 tablet (25 mg total) by mouth every 8 (eight) hours as needed. 12 tablet 1    typhoid (VIVOTIF) DR capsule Take 1 capsule by mouth every other day. 4 capsule 0     No facility-administered encounter medications on file as of 6/29/2017.        Assessment - Diagnosis - Goals:     Impression: 22-year-old female with history of ADHD presents to transfer management of ADHD from pediatrician to psychiatry department, currently her symptoms are well-controlled on vyvanse 40mg daily.      ICD-10-CM ICD-9-CM   1. ADHD (attention deficit hyperactivity disorder), inattentive type F90.0 314.00       Strengths and Liabilities: Strength: Patient accepts guidance/feedback, Strength: Patient is expressive/articulate., Strength: Patient is intelligent.,  Strength: Patient is motivated for change., Strength: Patient is physically healthy., Strength: Patient has positive support network., Strength: Patient has reasonable judgment., Strength: Patient is stable.    Treatment Plan/Recommendations:   · Continue vyvanse 40mg daily for ADHD -- patient did not require a prescription today as she recently obtained a refill of this medication from her primary care doctor before transfer of care, may request refill without an appointment  · Discussed risks, benefits, and alternatives to this medication with this patient    Return to Clinic: 3 months    Time spent face-to-face with patient: 30 minutes, with >50% spent in counseling.

## 2017-07-26 ENCOUNTER — PATIENT MESSAGE (OUTPATIENT)
Dept: PSYCHIATRY | Facility: CLINIC | Age: 22
End: 2017-07-26

## 2017-07-26 DIAGNOSIS — F90.0 ADHD (ATTENTION DEFICIT HYPERACTIVITY DISORDER), INATTENTIVE TYPE: Primary | ICD-10-CM

## 2017-07-26 RX ORDER — LISDEXAMFETAMINE DIMESYLATE 40 MG/1
CAPSULE ORAL
COMMUNITY
Start: 2017-06-20 | End: 2017-07-26 | Stop reason: SDUPTHER

## 2017-07-26 RX ORDER — LISDEXAMFETAMINE DIMESYLATE 40 MG/1
40 CAPSULE ORAL DAILY
Qty: 40 CAPSULE | Refills: 0 | Status: SHIPPED | OUTPATIENT
Start: 2017-07-26 | End: 2017-08-28 | Stop reason: SDUPTHER

## 2017-08-28 DIAGNOSIS — F90.0 ADHD (ATTENTION DEFICIT HYPERACTIVITY DISORDER), INATTENTIVE TYPE: ICD-10-CM

## 2017-08-28 RX ORDER — LISDEXAMFETAMINE DIMESYLATE 40 MG/1
40 CAPSULE ORAL DAILY
Qty: 40 CAPSULE | Refills: 0 | Status: SHIPPED | OUTPATIENT
Start: 2017-08-28 | End: 2017-10-02 | Stop reason: SDUPTHER

## 2017-09-25 ENCOUNTER — PATIENT MESSAGE (OUTPATIENT)
Dept: OBSTETRICS AND GYNECOLOGY | Facility: CLINIC | Age: 22
End: 2017-09-25

## 2017-09-25 DIAGNOSIS — Z30.41 ENCOUNTER FOR SURVEILLANCE OF CONTRACEPTIVE PILLS: ICD-10-CM

## 2017-09-25 RX ORDER — NORETHINDRONE ACETATE AND ETHINYL ESTRADIOL AND FERROUS FUMARATE 1MG-20(24)
KIT ORAL
Qty: 84 TABLET | Refills: 3 | Status: SHIPPED | OUTPATIENT
Start: 2017-09-25 | End: 2018-03-26 | Stop reason: SDUPTHER

## 2017-10-02 DIAGNOSIS — F90.0 ADHD (ATTENTION DEFICIT HYPERACTIVITY DISORDER), INATTENTIVE TYPE: ICD-10-CM

## 2017-10-02 RX ORDER — LISDEXAMFETAMINE DIMESYLATE 40 MG/1
40 CAPSULE ORAL DAILY
Qty: 40 CAPSULE | Refills: 0 | Status: SHIPPED | OUTPATIENT
Start: 2017-10-02 | End: 2017-11-02 | Stop reason: SDUPTHER

## 2017-11-02 ENCOUNTER — PATIENT MESSAGE (OUTPATIENT)
Dept: PSYCHIATRY | Facility: CLINIC | Age: 22
End: 2017-11-02

## 2017-11-02 DIAGNOSIS — F90.0 ADHD (ATTENTION DEFICIT HYPERACTIVITY DISORDER), INATTENTIVE TYPE: ICD-10-CM

## 2017-11-02 RX ORDER — LISDEXAMFETAMINE DIMESYLATE 40 MG/1
40 CAPSULE ORAL DAILY
Qty: 40 CAPSULE | Refills: 0 | Status: SHIPPED | OUTPATIENT
Start: 2017-11-02 | End: 2017-11-22 | Stop reason: SDUPTHER

## 2017-11-02 RX ORDER — LISDEXAMFETAMINE DIMESYLATE 40 MG/1
40 CAPSULE ORAL DAILY
Qty: 40 CAPSULE | Refills: 0 | OUTPATIENT
Start: 2017-11-02

## 2017-11-13 ENCOUNTER — TELEPHONE (OUTPATIENT)
Dept: DERMATOLOGY | Facility: CLINIC | Age: 22
End: 2017-11-13

## 2017-11-13 NOTE — TELEPHONE ENCOUNTER
----- Message from Barbara Murry sent at 11/13/2017 12:17 PM CST -----  Contact: self  Patient need follow up appointment for  mole  check for the month of December.   Please call pt at [phone]

## 2017-11-22 ENCOUNTER — OFFICE VISIT (OUTPATIENT)
Dept: PSYCHIATRY | Facility: CLINIC | Age: 22
End: 2017-11-22
Payer: COMMERCIAL

## 2017-11-22 VITALS
WEIGHT: 132 LBS | HEIGHT: 67 IN | DIASTOLIC BLOOD PRESSURE: 93 MMHG | BODY MASS INDEX: 20.72 KG/M2 | SYSTOLIC BLOOD PRESSURE: 131 MMHG | HEART RATE: 83 BPM

## 2017-11-22 DIAGNOSIS — F90.0 ADHD (ATTENTION DEFICIT HYPERACTIVITY DISORDER), INATTENTIVE TYPE: ICD-10-CM

## 2017-11-22 PROCEDURE — 99999 PR PBB SHADOW E&M-EST. PATIENT-LVL III: CPT | Mod: PBBFAC,,,

## 2017-11-22 PROCEDURE — 99213 OFFICE O/P EST LOW 20 MIN: CPT | Mod: S$GLB,,,

## 2017-11-22 PROCEDURE — 90833 PSYTX W PT W E/M 30 MIN: CPT | Mod: S$GLB,,,

## 2017-11-22 RX ORDER — LISDEXAMFETAMINE DIMESYLATE 40 MG/1
40 CAPSULE ORAL DAILY
Qty: 30 CAPSULE | Refills: 0 | Status: SHIPPED | OUTPATIENT
Start: 2017-12-02 | End: 2018-02-09 | Stop reason: SDUPTHER

## 2017-11-22 RX ORDER — LISDEXAMFETAMINE DIMESYLATE 40 MG/1
40 CAPSULE ORAL DAILY
Qty: 30 CAPSULE | Refills: 0 | Status: SHIPPED | OUTPATIENT
Start: 2018-01-30 | End: 2018-02-09 | Stop reason: SDUPTHER

## 2017-11-22 RX ORDER — LISDEXAMFETAMINE DIMESYLATE 40 MG/1
40 CAPSULE ORAL DAILY
Qty: 40 CAPSULE | Refills: 0 | Status: SHIPPED | OUTPATIENT
Start: 2018-01-01 | End: 2018-02-09 | Stop reason: SDUPTHER

## 2017-11-27 NOTE — PROGRESS NOTES
"Outpatient Psychiatry Follow-Up Visit (MD/NP)    11/22/2017    Clinical Status of Patient: Outpatient (Ambulatory)    Chief Complaint:  Melinda Mccann is a 22 y.o. female who presents today for follow-up of attention problems.  Met with patient.     Interval History and Content of Current Session:  Interim Events/Subjective Report/Content of Current Session: Patient reports that she is doing extremely well on vyvanse, reports that she does not feel it "kick in" and wear off like adderall and does not experience the negative side effects she was experiencing on adderall, and that it is very effective for her ADHD symptoms, she is performing well academically, personal life is going well (she brought her boyfriend home for Thanksgiving to meet her family). We discussed strategies for managing technology and social media use with ADHD.     Psychotherapy:  · Target symptoms: inattention  · Why chosen therapy is appropriate versus another modality: relevant to diagnosis, patient responds to this modality, evidence based practice  · Outcome monitoring methods: self-report, observation  · Therapeutic intervention type: insight oriented psychotherapy, behavior modifying psychotherapy, supportive psychotherapy  · Topics discussed/themes: relationships difficulties, building skills sets for symptom management, symptom recognition, life stage transitional issues  · The patient's response to the intervention is accepting. The patient's progress toward treatment goals is fair.   · Duration of intervention: 16 minutes.    Review of Systems   Review of Systems   Constitutional: Negative for diaphoresis.   HENT: Negative for congestion.    Eyes: Negative for discharge.   Respiratory: Negative for apnea.    Cardiovascular: Negative for leg swelling.   Gastrointestinal: Negative for abdominal distention.   Endocrine: Negative for cold intolerance.   Genitourinary: Negative for flank pain.   Musculoskeletal: Negative for gait " "problem.   Skin: Negative for color change.   Neurological: Negative for dizziness.   Hematological: Negative for adenopathy.     Past Medical, Family and Social History: The patient's past medical, family and social history have been reviewed and updated as appropriate within the electronic medical record - see encounter notes.    Compliance: yes    Side effects: denies    Risk Parameters:  Patient reports no suicidal ideation  Patient reports no homicidal ideation  Patient reports no self-injurious behavior  Patient reports no violent behavior    Exam (detailed: at least 9 elements; comprehensive: all 15 elements)   Constitutional  Vitals:  Most recent vital signs, dated 6/29/2017, were reviewed.   Vitals:    11/22/17 0937   BP: (!) 131/93   Pulse: 83   Weight: 59.9 kg (132 lb)   Height: 5' 7" (1.702 m)        General:  unremarkable, age appropriate, normal weight     Musculoskeletal  Muscle Strength/Tone:  not examined   Gait & Station:  non-ataxic     Psychiatric  Speech:  no latency; no press   Mood & Affect:  happy  congruent and appropriate   Thought Process:  normal and logical   Associations:  intact   Thought Content:  normal, no suicidality, no homicidality, delusions, or paranoia   Insight:  intact   Judgement: behavior is adequate to circumstances   Orientation:  grossly intact   Memory: intact for content of interview   Language: grossly intact   Attention Span & Concentration:  able to focus   Fund of Knowledge:  intact and appropriate to age and level of education     No visits with results within 1 Month(s) from this visit.   Latest known visit with results is:   Admission on 05/17/2016, Discharged on 05/17/2016   Component Date Value Ref Range Status    POC Preg Test, Ur 05/17/2016 Negative  Negative Final     Acceptable 05/17/2016 Yes   Final     Review of lab results shows that pregnancy test was negative on 5/17/16.    Assessment and Diagnosis       ICD-10-CM ICD-9-CM   1. ADHD " (attention deficit hyperactivity disorder), inattentive type F90.0 314.00       Intervention/Counseling/Treatment Plan   · Continue vyvanse 40mg daily for ADHD.     Return to Clinic: 3 months

## 2017-12-19 ENCOUNTER — OFFICE VISIT (OUTPATIENT)
Dept: DERMATOLOGY | Facility: CLINIC | Age: 22
End: 2017-12-19
Payer: COMMERCIAL

## 2017-12-19 VITALS — WEIGHT: 132 LBS | BODY MASS INDEX: 20.67 KG/M2

## 2017-12-19 DIAGNOSIS — D22.9 NEVUS OF MULTIPLE SITES: ICD-10-CM

## 2017-12-19 DIAGNOSIS — L70.0 COMEDONAL ACNE: Primary | ICD-10-CM

## 2017-12-19 DIAGNOSIS — Z87.898 HISTORY OF ATYPICAL NEVUS: ICD-10-CM

## 2017-12-19 PROCEDURE — 99999 PR PBB SHADOW E&M-EST. PATIENT-LVL III: CPT | Mod: PBBFAC,,, | Performed by: DERMATOLOGY

## 2017-12-19 PROCEDURE — 99213 OFFICE O/P EST LOW 20 MIN: CPT | Mod: S$GLB,,, | Performed by: DERMATOLOGY

## 2017-12-19 RX ORDER — CLINDAMYCIN PHOSPHATE 10 UG/ML
LOTION TOPICAL
Qty: 60 ML | Refills: 3 | Status: SHIPPED | OUTPATIENT
Start: 2017-12-19 | End: 2018-05-10

## 2017-12-19 RX ORDER — AMOXICILLIN 875 MG/1
875 TABLET, FILM COATED ORAL 2 TIMES DAILY
Refills: 0 | COMMUNITY
Start: 2017-09-11 | End: 2018-05-10

## 2017-12-19 NOTE — PROGRESS NOTES
Subjective:       Patient ID:  Melinda Mccann is a 22 y.o. female who presents for   Chief Complaint   Patient presents with    Follow-up     TBSE    Mole     History of Present Illness: The patient presents with chief complaint of mole.  Location: abdomen  Duration: years  Signs/Symptoms: some darkening    Prior treatments: none    Also acne using differin helps some.           Review of Systems   Constitutional: Negative for fever.   Skin: Negative for itching and rash.   Hematologic/Lymphatic: Does not bruise/bleed easily.        Objective:    Physical Exam   Constitutional: She appears well-developed and well-nourished. No distress.   Neurological: She is alert and oriented to person, place, and time. She is not disoriented.   Psychiatric: She has a normal mood and affect.   Skin:   Areas Examined (abnormalities noted in diagram):   Head / Face Inspection Performed  Neck Inspection Performed  Chest / Axilla Inspection Performed  Abdomen Inspection Performed  Back Inspection Performed  RUE Inspected  LUE Inspection Performed  RLE Inspected  LLE Inspection Performed                   Diagram Legend     Erythematous scaling macule/papule c/w actinic keratosis       Vascular papule c/w angioma      Pigmented verrucoid papule/plaque c/w seborrheic keratosis      Yellow umbilicated papule c/w sebaceous hyperplasia      Irregularly shaped tan macule c/w lentigo     1-2 mm smooth white papules consistent with Milia      Movable subcutaneous cyst with punctum c/w epidermal inclusion cyst      Subcutaneous movable cyst c/w pilar cyst      Firm pink to brown papule c/w dermatofibroma      Pedunculated fleshy papule(s) c/w skin tag(s)      Evenly pigmented macule c/w junctional nevus     Mildly variegated pigmented, slightly irregular-bordered macule c/w mildly atypical nevus      Flesh colored to evenly pigmented papule c/w intradermal nevus       Pink pearly papule/plaque c/w basal cell carcinoma       "Erythematous hyperkeratotic cursted plaque c/w SCC      Surgical scar with no sign of skin cancer recurrence      Open and closed comedones      Inflammatory papules and pustules      Verrucoid papule consistent consistent with wart     Erythematous eczematous patches and plaques     Dystrophic onycholytic nail with subungual debris c/w onychomycosis     Umbilicated papule    Erythematous-base heme-crusted tan verrucoid plaque consistent with inflamed seborrheic keratosis     Erythematous Silvery Scaling Plaque c/w Psoriasis     See annotation      Assessment / Plan:        Comedonal acne  -     clindamycin (CLEOCIN T) 1 % lotion; Use hs on face  Dispense: 60 mL; Refill: 3  Use with differin hs  Nevus of multiple sites  The "ABCD" rules to observe pigmented lesions were reviewed.  Brochure provided      History of atypical nevus  Removed 2016     Biopsy form 7/28:  Skin, right medial thigh, shave biopsy:  - MELANOCYTIC NEVUS, COMPOUND TYPE WITH ARCHITECTURAL DISORDER AND MODERATE  CYTOLOGIC ATYPIA (AMARI'S NEVUS).  - LESION EXTENDS TO THE LATERAL AND DEEP BIOPSY MARGINS.  MICROSCOPIC DESCRIPTION: Sections show nests of melanocytes with scattered single cells located  predominantly along the dermoepidermal junction and extending along elongated rete ridges. The melanocytes  display moderate junctional cytologic atypia. Papillary dermal fibrosis, a mild lymphocytic infiltrate and  melanophages are present. Nests and cords of melanocytes are present within the dermis. Multiple deeper levels  have been examined. MART-1 immunostain is used in the evaluation of this lesion. Stain controls are reviewed and  are appropriately reactive.  Diagnosed by: Mercy Rausch M.D.  (Electronically Signed: 2016-08-02 15:33:53)             Return in about 1 year (around 12/19/2018).  "

## 2018-02-09 ENCOUNTER — OFFICE VISIT (OUTPATIENT)
Dept: PSYCHIATRY | Facility: CLINIC | Age: 23
End: 2018-02-09
Payer: COMMERCIAL

## 2018-02-09 VITALS
DIASTOLIC BLOOD PRESSURE: 85 MMHG | WEIGHT: 131.81 LBS | SYSTOLIC BLOOD PRESSURE: 122 MMHG | HEART RATE: 66 BPM | BODY MASS INDEX: 20.69 KG/M2 | HEIGHT: 67 IN

## 2018-02-09 DIAGNOSIS — F90.0 ADHD (ATTENTION DEFICIT HYPERACTIVITY DISORDER), INATTENTIVE TYPE: ICD-10-CM

## 2018-02-09 PROCEDURE — 99214 OFFICE O/P EST MOD 30 MIN: CPT | Mod: S$GLB,,,

## 2018-02-09 PROCEDURE — 99999 PR PBB SHADOW E&M-EST. PATIENT-LVL III: CPT | Mod: PBBFAC,,,

## 2018-02-09 PROCEDURE — 3008F BODY MASS INDEX DOCD: CPT | Mod: S$GLB,,,

## 2018-02-09 RX ORDER — LISDEXAMFETAMINE DIMESYLATE 40 MG/1
40 CAPSULE ORAL DAILY
Qty: 30 CAPSULE | Refills: 0 | Status: SHIPPED | OUTPATIENT
Start: 2018-03-08 | End: 2018-02-28

## 2018-02-09 RX ORDER — LISDEXAMFETAMINE DIMESYLATE 40 MG/1
40 CAPSULE ORAL DAILY
Qty: 40 CAPSULE | Refills: 0 | Status: SHIPPED | OUTPATIENT
Start: 2018-04-07 | End: 2018-02-28

## 2018-02-09 RX ORDER — LISDEXAMFETAMINE DIMESYLATE 40 MG/1
40 CAPSULE ORAL DAILY
Qty: 30 CAPSULE | Refills: 0 | Status: SHIPPED | OUTPATIENT
Start: 2018-02-09 | End: 2018-02-14 | Stop reason: SDUPTHER

## 2018-02-14 DIAGNOSIS — F90.0 ADHD (ATTENTION DEFICIT HYPERACTIVITY DISORDER), INATTENTIVE TYPE: ICD-10-CM

## 2018-02-14 RX ORDER — LISDEXAMFETAMINE DIMESYLATE 40 MG/1
40 CAPSULE ORAL DAILY
Qty: 30 CAPSULE | Refills: 0 | Status: SHIPPED | OUTPATIENT
Start: 2018-02-14 | End: 2018-02-28

## 2018-02-14 NOTE — PROGRESS NOTES
Outpatient Psychiatry Follow-Up Visit (MD/NP)    2/9/2018    Clinical Status of Patient: Outpatient (Ambulatory)    Chief Complaint:  Melinda Mccann is a 22 y.o. female who presents today for follow-up of attention problems.  Met with patient.     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 She continues to report that she has no side effects on vyvanse and that it has been both more effective for her than adderall. She denies insomnia, irritability, headaches, GI problems, decreased appetite, heart palpitations, anxiety.     Reviewed notes from pt's last visit with dermatology on 12/19/17, she wast treated for comedonal acne with clindamycin.     Review of Systems   Review of Systems   Constitutional: Negative for diaphoresis.   HENT: Negative for congestion.    Eyes: Negative for discharge.   Respiratory: Negative for apnea.    Cardiovascular: Negative for leg swelling.   Gastrointestinal: Negative for abdominal distention.   Endocrine: Negative for cold intolerance.   Genitourinary: Negative for flank pain.   Musculoskeletal: Negative for gait problem.   Skin: Negative for color change.   Neurological: Negative for dizziness.   Hematological: Negative for adenopathy.     Social History: raised by mother and father, has two brothers ages 23 and 25. Mother is a homemaker, father is an . 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 She is pre-med, applying to medical schools for fall 2018, she hopes to attend [employer] [place]. Close group of friends from both high school and college, lives off-campus with three roommates. Single, straight, sexually active, using protection (condoms and OCP), no history  "of pregnancy.      Medical History: Migraines about once every two weeks. Patient denies history of seizures or head trauma/loss of consciousness.      Family Psychiatric History: 25 year old brother with ADHD and anxiety, 23 year old brother with depression    Compliance: yes    Side effects: denies    Risk Parameters:  Patient reports no suicidal ideation  Patient reports no homicidal ideation  Patient reports no self-injurious behavior  Patient reports no violent behavior    Exam (detailed: at least 9 elements; comprehensive: all 15 elements)   Constitutional  Vitals:  Most recent vital signs, dated 6/29/2017, were reviewed.   Vitals:    02/09/18 0944   BP: 122/85   Pulse: 66   Weight: 59.8 kg (131 lb 12.8 oz)   Height: 5' 7" (1.702 m)        General:  unremarkable, age appropriate, normal weight     Musculoskeletal  Muscle Strength/Tone:  not examined   Gait & Station:  non-ataxic     Psychiatric  Speech:  no latency; no press   Mood & Affect:  happy  congruent and appropriate   Thought Process:  normal and logical   Associations:  intact   Thought Content:  normal, no suicidality, no homicidality, delusions, or paranoia   Insight:  intact   Judgement: behavior is adequate to circumstances   Orientation:  grossly intact   Memory: intact for content of interview   Language: grossly intact   Attention Span & Concentration:  able to focus   Fund of Knowledge:  intact and appropriate to age and level of education     No visits with results within 1 Month(s) from this visit.   Latest known visit with results is:   Admission on 05/17/2016, Discharged on 05/17/2016   Component Date Value Ref Range Status    POC Preg Test, Ur 05/17/2016 Negative  Negative Final     Acceptable 05/17/2016 Yes   Final     Review of lab results shows that pregnancy test was negative on 5/17/16.    Assessment and Diagnosis       ICD-10-CM ICD-9-CM   1. ADHD (attention deficit hyperactivity disorder), inattentive type F90.0 " 314.00       Intervention/Counseling/Treatment Plan   · Continue vyvanse 40mg daily for ADHD.     Return to Clinic: 3 months

## 2018-02-20 ENCOUNTER — PATIENT MESSAGE (OUTPATIENT)
Dept: OBSTETRICS AND GYNECOLOGY | Facility: CLINIC | Age: 23
End: 2018-02-20

## 2018-02-27 ENCOUNTER — PATIENT MESSAGE (OUTPATIENT)
Dept: PSYCHIATRY | Facility: CLINIC | Age: 23
End: 2018-02-27

## 2018-02-28 DIAGNOSIS — F90.0 ADHD (ATTENTION DEFICIT HYPERACTIVITY DISORDER), INATTENTIVE TYPE: Primary | ICD-10-CM

## 2018-02-28 RX ORDER — DEXTROAMPHETAMINE SULFATE 10 MG/1
10 CAPSULE, EXTENDED RELEASE ORAL DAILY
Qty: 30 CAPSULE | Refills: 0 | Status: SHIPPED | OUTPATIENT
Start: 2018-02-28 | End: 2018-05-10

## 2018-03-20 ENCOUNTER — TELEPHONE (OUTPATIENT)
Dept: PSYCHIATRY | Facility: CLINIC | Age: 23
End: 2018-03-20

## 2018-03-26 DIAGNOSIS — Z30.41 ENCOUNTER FOR SURVEILLANCE OF CONTRACEPTIVE PILLS: ICD-10-CM

## 2018-04-27 ENCOUNTER — PATIENT OUTREACH (OUTPATIENT)
Dept: ADMINISTRATIVE | Facility: HOSPITAL | Age: 23
End: 2018-04-27

## 2018-04-27 NOTE — PROGRESS NOTES
[facility] is committed to your overall health.  To help you get the most out of each of your visits, we will review your information to make sure you are up to date on all of your recommended tests and/or procedures.       Your PCP  Kenia Greco MD   found that you may be due for:     TETANUS VACCINATION         If you have had any of the above done at another facility, please bring the records or information with you so that your record at [facility] will be complete.  If you would like to schedule any of these, please contact me.     If you are currently taking medication, please bring it with you to your appointment for review.     Also, if you have any type of Advanced Directives, please bring them with you to your office visit so we may scan them into your chart.       Thank you for Choosing [facility] for your healthcare needs.        Additional Information  If you have questions, you can email [email or web address] or call [phone]  to talk to our [facility] staff. Remember, [facility] is NOT to be used for urgent needs. For medical emergencies, dial 911.

## 2018-05-10 ENCOUNTER — OFFICE VISIT (OUTPATIENT)
Dept: OBSTETRICS AND GYNECOLOGY | Facility: CLINIC | Age: 23
End: 2018-05-10
Attending: OBSTETRICS & GYNECOLOGY
Payer: COMMERCIAL

## 2018-05-10 ENCOUNTER — OFFICE VISIT (OUTPATIENT)
Dept: INTERNAL MEDICINE | Facility: CLINIC | Age: 23
End: 2018-05-10
Attending: FAMILY MEDICINE
Payer: COMMERCIAL

## 2018-05-10 ENCOUNTER — LAB VISIT (OUTPATIENT)
Dept: LAB | Facility: OTHER | Age: 23
End: 2018-05-10
Attending: FAMILY MEDICINE
Payer: COMMERCIAL

## 2018-05-10 VITALS
BODY MASS INDEX: 21.21 KG/M2 | SYSTOLIC BLOOD PRESSURE: 120 MMHG | OXYGEN SATURATION: 98 % | HEIGHT: 67 IN | DIASTOLIC BLOOD PRESSURE: 70 MMHG | WEIGHT: 135.13 LBS | HEART RATE: 92 BPM

## 2018-05-10 VITALS
DIASTOLIC BLOOD PRESSURE: 70 MMHG | WEIGHT: 138 LBS | HEIGHT: 67 IN | SYSTOLIC BLOOD PRESSURE: 110 MMHG | BODY MASS INDEX: 21.66 KG/M2

## 2018-05-10 DIAGNOSIS — Z00.00 ANNUAL PHYSICAL EXAM: ICD-10-CM

## 2018-05-10 DIAGNOSIS — Z00.00 ANNUAL PHYSICAL EXAM: Primary | ICD-10-CM

## 2018-05-10 DIAGNOSIS — Z11.3 SCREENING EXAMINATION FOR VENEREAL DISEASE: ICD-10-CM

## 2018-05-10 DIAGNOSIS — Z01.419 WELL WOMAN EXAM WITH ROUTINE GYNECOLOGICAL EXAM: Primary | ICD-10-CM

## 2018-05-10 LAB
CANDIDA RRNA VAG QL PROBE: POSITIVE
G VAGINALIS RRNA GENITAL QL PROBE: NEGATIVE
T VAGINALIS RRNA GENITAL QL PROBE: NEGATIVE

## 2018-05-10 PROCEDURE — 86787 VARICELLA-ZOSTER ANTIBODY: CPT

## 2018-05-10 PROCEDURE — 86765 RUBEOLA ANTIBODY: CPT

## 2018-05-10 PROCEDURE — 86735 MUMPS ANTIBODY: CPT

## 2018-05-10 PROCEDURE — 86703 HIV-1/HIV-2 1 RESULT ANTBDY: CPT

## 2018-05-10 PROCEDURE — 86592 SYPHILIS TEST NON-TREP QUAL: CPT

## 2018-05-10 PROCEDURE — 87491 CHLMYD TRACH DNA AMP PROBE: CPT

## 2018-05-10 PROCEDURE — 99395 PREV VISIT EST AGE 18-39: CPT | Mod: S$GLB,,, | Performed by: OBSTETRICS & GYNECOLOGY

## 2018-05-10 PROCEDURE — 99395 PREV VISIT EST AGE 18-39: CPT | Mod: S$GLB,,, | Performed by: FAMILY MEDICINE

## 2018-05-10 PROCEDURE — 99999 PR PBB SHADOW E&M-EST. PATIENT-LVL IV: CPT | Mod: PBBFAC,,, | Performed by: OBSTETRICS & GYNECOLOGY

## 2018-05-10 PROCEDURE — 86762 RUBELLA ANTIBODY: CPT

## 2018-05-10 PROCEDURE — 86803 HEPATITIS C AB TEST: CPT

## 2018-05-10 PROCEDURE — 87510 GARDNER VAG DNA DIR PROBE: CPT

## 2018-05-10 PROCEDURE — 87480 CANDIDA DNA DIR PROBE: CPT

## 2018-05-10 PROCEDURE — 99999 PR PBB SHADOW E&M-EST. PATIENT-LVL III: CPT | Mod: PBBFAC,,, | Performed by: FAMILY MEDICINE

## 2018-05-10 PROCEDURE — 86706 HEP B SURFACE ANTIBODY: CPT

## 2018-05-10 RX ORDER — NORGESTIMATE AND ETHINYL ESTRADIOL 0.25-0.035
1 KIT ORAL DAILY
Qty: 30 TABLET | Refills: 11 | Status: SHIPPED | OUTPATIENT
Start: 2018-05-10 | End: 2018-06-18

## 2018-05-10 NOTE — PROGRESS NOTES
"CC: Well woman exam    Melinda Mccann is a 23 y.o. female  presents for well woman exam.  LMP: Patient's last menstrual period was 2018 (exact date)..   Has had gardasil vaccine.  Normal pap last year, some spotting with pill early in the days prior to placebo, increased cramping as well, has noted this since was diagnosed and treated for BV several months ago. One partner, would like STD screening.        Past Medical History:   Diagnosis Date    Accessory navicular bone of foot     right    ADD (attention deficit disorder)     Cancer     Dysplastic nevus     Still's murmur      Past Surgical History:   Procedure Laterality Date    COLONOSCOPY N/A 2016    Procedure: COLONOSCOPY;  Surgeon: ROSY Ferguson MD;  Location: [address]);  Service: Endoscopy;  Laterality: N/A;    WISDOM TOOTH EXTRACTION       Social History     Social History    Marital status: Single     Spouse name: N/A    Number of children: N/A    Years of education: N/A     Occupational History    hospital scribe      Social History Main Topics    Smoking status: Never Smoker    Smokeless tobacco: Never Used    Alcohol use 0.0 oz/week    Drug use: No    Sexual activity: Yes     Partners: Male     Birth control/ protection: Condom, OCP     Other Topics Concern    Are You Pregnant Or Think You May Be? No    Breast-Feeding No     Social History Narrative    No narrative on file     Family History   Problem Relation Age of Onset    Mitral valve prolapse Mother     Hyperlipidemia Mother     Osteoporosis Mother     Stroke Maternal Aunt         maternal great aunt    Breast cancer Neg Hx     Colon cancer Neg Hx     Ovarian cancer Neg Hx     Diabetes Neg Hx     Hypertension Neg Hx     Melanoma Neg Hx      OB History      Para Term  AB Living    0 0 0 0 0 0    SAB TAB Ectopic Multiple Live Births    0 0 0 0            /70   Ht 5' 7" (1.702 m)   Wt 62.6 kg (138 lb 0.1 oz)   LMP " 04/29/2018 (Exact Date)   BMI 21.62 kg/m²       ROS:  GENERAL: Denies weight gain or weight loss. Feeling well overall.   SKIN: Denies rash or lesions.   HEAD: Denies head injury or headache.   NODES: Denies enlarged lymph nodes.   CHEST: Denies chest pain or shortness of breath.   CARDIOVASCULAR: Denies palpitations or left sided chest pain.   ABDOMEN: No abdominal pain, constipation, diarrhea, nausea, vomiting or rectal bleeding.   URINARY: No frequency, dysuria, hematuria, or burning on urination.  REPRODUCTIVE: See HPI.   BREASTS: The patient performs breast self-examination and denies pain, lumps, or nipple discharge.   HEMATOLOGIC: No easy bruisability or excessive bleeding.   MUSCULOSKELETAL: Denies joint pain or swelling.   NEUROLOGIC: Denies syncope or weakness.   PSYCHIATRIC: Denies depression, anxiety or mood swings.    PHYSICAL EXAM:  APPEARANCE: Well nourished, well developed, in no acute distress.  AFFECT: WNL, alert and oriented x 3  SKIN: No acne or hirsutism  NECK: Neck symmetric without masses or thyromegaly  NODES: No inguinal, cervical, axillary, or femoral lymph node enlargement  CHEST: Good respiratory effect  ABDOMEN: Soft.  No tenderness or masses.  No hepatosplenomegaly.  No hernias.  BREASTS: Symmetrical, no skin changes or visible lesions.  No palpable masses, nipple discharge bilaterally.  PELVIC: Normal external genitalia without lesions.  Normal hair distribution.  Adequate perineal body, normal urethral meatus.  Vagina moist and well rugated without lesions, discharge noted, consistent with yeast.  Cervix pink, without lesions, discharge or tenderness.  No significant cystocele or rectocele.  Bimanual exam shows uterus to be normal size, regular, mobile and nontender.  Adnexa without masses or tenderness.    EXTREMITIES: No edema.    Well woman exam with routine gynecological exam    Screening examination for venereal disease  -     Vaginosis Screen by DNA Probe  -     C.  trachomatis/N. gonorrhoeae by AMP DNA Cervicovaginal    Other orders  -     norgestimate-ethinyl estradiol (ORTHO-CYCLEN) 0.25-35 mg-mcg per tablet; Take 1 tablet by mouth once daily.  Dispense: 30 tablet; Refill: 11        - Will attempt to stop bleeding prior to placebo by increasing estrogen dose.        Patient was counseled today on A.C.S. Pap guidelines and recommendations for yearly pelvic exams, mammograms and monthly self breast exams; to see her PCP for other health maintenance.     No Follow-up on file.

## 2018-05-10 NOTE — PROGRESS NOTES
"Subjective:      Patient ID: Melinda Mccann is a 23 y.o. female.    Chief Complaint: Follow-up    She is here for annual exam. She takes cetirizine with allergies and this is well controlled. She has has reduction in headaches. She only takes vyvanse during the week.       Review of Systems   Constitutional: Negative.    HENT: Negative.    Respiratory: Negative.    Cardiovascular: Negative.    Gastrointestinal: Negative.    Genitourinary: Negative.    Neurological: Negative.      I personally reviewed Past Medical History, Past Surgical history,  Past Social History and Family History    Objective:   /70   Pulse 92   Ht 5' 7" (1.702 m)   Wt 61.3 kg (135 lb 2.3 oz)   LMP 04/29/2018 (Exact Date)   SpO2 98%   BMI 21.17 kg/m²     Physical Exam   Constitutional: She is oriented to person, place, and time. She appears well-developed and well-nourished. No distress.   HENT:   Head: Normocephalic and atraumatic.   Right Ear: External ear normal.   Left Ear: External ear normal.   Mouth/Throat: Oropharynx is clear and moist.   Eyes: Conjunctivae and EOM are normal. Pupils are equal, round, and reactive to light. Right eye exhibits no discharge. Left eye exhibits no discharge. No scleral icterus.   Neck: Normal range of motion. Neck supple.   Cardiovascular: Normal rate, regular rhythm, normal heart sounds and intact distal pulses.  Exam reveals no gallop.    No murmur heard.  Pulmonary/Chest: Effort normal and breath sounds normal. No respiratory distress. She has no wheezes. She has no rales. She exhibits no tenderness.   Abdominal: Soft. Bowel sounds are normal. She exhibits no distension and no mass. There is no tenderness. There is no rebound and no guarding.   Musculoskeletal: Normal range of motion.   Neurological: She is alert and oriented to person, place, and time.   Skin: Skin is warm and dry.   Psychiatric: She has a normal mood and affect. Her behavior is normal. Judgment and thought content " normal.   Vitals reviewed.      Melinda was seen today for follow-up.    Diagnoses and all orders for this visit:    Annual physical exam  -     Rubella antibody, IgG; Future  -     Rubeola antibody IgG; Future  -     Mumps, IgG Screen; Future  -     Hepatitis B Surface Antibody, Qual/Quant; Future  -     Varicella zoster antibody, IgG; Future  -     QUANTIFERON GOLD TB; Future  -     HIV-1 and HIV-2 antibodies; Future  -     Hepatitis C antibody; Future  -     RPR; Future

## 2018-05-11 ENCOUNTER — OFFICE VISIT (OUTPATIENT)
Dept: PSYCHIATRY | Facility: CLINIC | Age: 23
End: 2018-05-11
Payer: COMMERCIAL

## 2018-05-11 VITALS
WEIGHT: 135.06 LBS | BODY MASS INDEX: 21.2 KG/M2 | SYSTOLIC BLOOD PRESSURE: 115 MMHG | HEIGHT: 67 IN | HEART RATE: 73 BPM | DIASTOLIC BLOOD PRESSURE: 78 MMHG

## 2018-05-11 DIAGNOSIS — F90.0 ADHD (ATTENTION DEFICIT HYPERACTIVITY DISORDER), INATTENTIVE TYPE: Primary | ICD-10-CM

## 2018-05-11 LAB
HCV AB SERPL QL IA: NEGATIVE
HIV 1+2 AB+HIV1 P24 AG SERPL QL IA: NEGATIVE
RPR SER QL: NORMAL
RUBV IGG SER-ACNC: 20.1 IU/ML
RUBV IGG SER-IMP: REACTIVE

## 2018-05-11 PROCEDURE — 99214 OFFICE O/P EST MOD 30 MIN: CPT | Mod: S$GLB,,, | Performed by: PSYCHIATRY & NEUROLOGY

## 2018-05-11 PROCEDURE — 99999 PR PBB SHADOW E&M-EST. PATIENT-LVL II: CPT | Mod: PBBFAC,,, | Performed by: PSYCHIATRY & NEUROLOGY

## 2018-05-11 RX ORDER — LISDEXAMFETAMINE DIMESYLATE 40 MG/1
40 CAPSULE ORAL DAILY
Qty: 30 CAPSULE | Refills: 0 | Status: SHIPPED | OUTPATIENT
Start: 2018-07-06 | End: 2019-06-19

## 2018-05-11 RX ORDER — LISDEXAMFETAMINE DIMESYLATE 40 MG/1
40 CAPSULE ORAL DAILY
Qty: 30 CAPSULE | Refills: 0 | Status: SHIPPED | OUTPATIENT
Start: 2018-05-11 | End: 2018-05-11 | Stop reason: SDUPTHER

## 2018-05-11 RX ORDER — LISDEXAMFETAMINE DIMESYLATE 40 MG/1
40 CAPSULE ORAL DAILY
Qty: 30 CAPSULE | Refills: 0 | Status: SHIPPED | OUTPATIENT
Start: 2018-06-08 | End: 2018-05-11 | Stop reason: SDUPTHER

## 2018-05-11 NOTE — PROGRESS NOTES
"5/11/2018  Melinda Mccann   1995   [id number]           OUTPATIENT PSYCHIATRY FOLLOW- UP VISIT    Clinical Status of Patient:  Outpatient (Ambulatory)    Chief Complaint:  Melinda Mccann is a 23 y.o. female who presents today for follow-up of attention problems.  Met with patient and I have reviewed the patient's medical history in detail and updated the computerized patient record. Previously seen by CONG Fragoso    Interval History and Content of Current Session:  Interim Events/Subjective Report/Content of Current Session:     Patient reports that she has been "good" since her last appointment. 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 Sleeping "great," and reports 8-9 hours/night. Appetite is "great" with some subjective weight gain. Denies any cardiac complications from Vyvanse, but does report that she had some tachycardia due to Adderall. Denies any depression (0/10, where 10 is the worst) or anxiety (1/10, where 10 is the worst). Denies any somatic complaints today. No SI, HI, AVH, delusions (TI, TW, IOR), paranoia, or manic-like symptoms. Believes current dose of medication is "just right" in that it helps with attention and focus, but still allows her to sleep and eat; able to take naps during the day.    Current Medications: Vyvanse 40mg daily (takes weekend drug holiday)    Compliance: yes     Side effects: denies       Social History: raised by mother and father, has two brothers. Mother is a homemaker, father is an . Attended [employer] for high school, graduated from the [employer], majored in Polish with minors in biology, psychology, and chemistry. She is accepted to medical school in [place]. Close group of friends from both high school and college. Single, straight, sexually active, using protection (condoms and OCP), no history of pregnancy.      Medical History: Migraines about once every two weeks. Patient denies history of seizures " "or head trauma/loss of consciousness.      Family Psychiatric History: brother with ADHD and anxiety, another brother with depression     Risk Parameters:  Patient reports no suicidal ideation  Patient reports no homicidal ideation  Patient reports no self-injurious behavior  Patient reports no violent behavior      Psychotherapy:  · none    Review of Systems - Obtained from the patient  General ROS: negative for - chills, fatigue or fever  Respiratory ROS: no cough, shortness of breath, or wheezing  Cardiovascular ROS: no chest pain or dyspnea on exertion  Gastrointestinal ROS: no abdominal pain, change in bowel habits, or black or bloody stools  Musculoskeletal ROS:not examined; non-ataxic  Neurological ROS: no TIA or stroke symptoms      Objective     Constitutional  Vitals:  Most recent vital signs, dated greater than 90 days prior to this appointment, were reviewed.    Vitals:    05/11/18 0929   BP: 115/78   Pulse: 73   Weight: 61.3 kg (135 lb 0.5 oz)   Height: 5' 7" (1.702 m)        Musculoskeletal  Muscle Strength/Tone:  not examined   Gait & Station:  non-ataxic     Relevant Elements of Neurological Exam: normal gait    Laboratory Data: reviewed most recent labs and noted any abnormalities.    Medications:  Outpatient Encounter Prescriptions as of 5/11/2018   Medication Sig Dispense Refill    cetirizine (ZYRTEC) 10 MG tablet TK 1 T PO QD  5    diphth,pertus,acell,,tetanus (BOOSTRIX TDAP) 2.5-8-5 Lf-mcg-Lf/0.5mL Syrg injection Inject 0.5 mLs into the muscle once. 0.5 mL 0    norgestimate-ethinyl estradiol (ORTHO-CYCLEN) 0.25-35 mg-mcg per tablet Take 1 tablet by mouth once daily. 30 tablet 11    sumatriptan (IMITREX) 25 MG Tab Take 1 tablet (25 mg total) by mouth every 8 (eight) hours as needed. 12 tablet 1     No facility-administered encounter medications on file as of 5/11/2018.        Allergy:  Review of patient's allergies indicates:  No Known Allergies    Nutritional Screening: Considering the " "patient's height and weight, medications, medical history and preferences, should a referral be made to the dietitian? no    AIMS- N/A    Mental Status Exam:  Arousal: alert  Sensorium/Orientation: grossly intact  Behavior/Cooperation: normal, cooperative, eye contact normal   Psychomotor: unremarkable and within normal limits  Speech: conversational rate, tone, and volume  Language: answered questions appropriately  Mood: "happy"   Affect: Appropriate and reactive to content  Thought Process: linear, logical  Thought Content:   Auditory hallucinations: NO  Visual hallucinations: NO  Paranoia: NO  Delusions:  NO  Suicidal ideation: NO  Homicidal ideation: NO  Attention/Concentration:  intact  able to focus  Memory: grossly intact     Insight: Intact  Judgment:Intact      Assessment and Diagnosis     Impression:     ICD-10-CM ICD-9-CM   1. ADHD (attention deficit hyperactivity disorder), inattentive type F90.0 314.00       Treatment Goals:  Specify outcomes written in observable, behavioral terms:   Attention: Improfed focus and attention through medication management    Status/Progress: Based on the examination today, the patient's problem(s) is/are well controlled.  New problems have not been presented today.   Co-morbidities, Diagnostic uncertainty and Lack of compliance are not complicating management of the primary condition.  There are no active rule-out diagnoses for this patient at this time.     Intervention/Counseling/Treatment Plan   · Medication Management: Continue current medications.   · Vyvanse 40mg daily  · LA  reviewed: no concerning findings  · Labs: reviewed most recent  · The treatment plan and follow up plan were reviewed with the patient.  · Discussed with patient informed consent, risks vs. benefits, alternative treatments, side effect profile and the inherent unpredictability of individual responses to these treatments. The patient expresses understanding of the above and displays the " capacity to agree with this current plan and had no other questions.  · Encouraged Patient to keep future appointments.   · Take medications as prescribed and abstain from substance abuse.   · In the event of an emergency patient was advised to go to the emergency room.  · Resident transition on July 1, 2018 discussed     Return to Clinic: as needed, unsure if patient will continue care here, or establish care in [place]    Coordination of care /Counseling time: > than 50% of total time was spend on this including reviewing recent medication changes, medical problems and social stressors.  Add on Psychotherapy time:0  Total Face time: 20 minutes    Yunior Leyva MD  [facility]  PGY-3  5/11/2018

## 2018-05-12 ENCOUNTER — PATIENT MESSAGE (OUTPATIENT)
Dept: INTERNAL MEDICINE | Facility: CLINIC | Age: 23
End: 2018-05-12

## 2018-05-12 ENCOUNTER — PATIENT MESSAGE (OUTPATIENT)
Dept: OBSTETRICS AND GYNECOLOGY | Facility: CLINIC | Age: 23
End: 2018-05-12

## 2018-05-12 LAB
C TRACH DNA SPEC QL NAA+PROBE: NOT DETECTED
HEP. B SURF AB, QUAL: NEGATIVE
HEP. B SURF AB, QUANT.: 6 MIU/ML
N GONORRHOEA DNA SPEC QL NAA+PROBE: NOT DETECTED
RUBEOLA IGG ANTIBODY: 1.81 ISR
RUBEOLA INTERPRETATION: POSITIVE
VARICELLA INTERPRETATION: POSITIVE
VARICELLA ZOSTER IGG: 3.03 ISR

## 2018-05-14 ENCOUNTER — TELEPHONE (OUTPATIENT)
Dept: INTERNAL MEDICINE | Facility: CLINIC | Age: 23
End: 2018-05-14

## 2018-05-14 DIAGNOSIS — Z00.00 ROUTINE ADULT HEALTH MAINTENANCE: Primary | ICD-10-CM

## 2018-05-14 LAB
MUMPS IGG INTERPRETATION: POSITIVE
MUMPS IGG SCREEN: 2.04 ISR

## 2018-05-14 RX ORDER — FLUCONAZOLE 150 MG/1
150 TABLET ORAL ONCE
Qty: 1 TABLET | Refills: 1 | Status: SHIPPED | OUTPATIENT
Start: 2018-05-14 | End: 2018-05-14

## 2018-05-14 NOTE — TELEPHONE ENCOUNTER
----- Message from Maryann Vu sent at 5/14/2018  9:37 AM CDT -----  Contact: FABIOLA VALENCIA [[id number]]    Name of Who is Calling: FABIOLA VALENCIA [[id number]]    What is the request in detail: pt would like a call back states she would like to schedule to get a hep B and tetanus shot. Please call      Can the clinic reply by [facility]: no    [facility] [phone]

## 2018-05-14 NOTE — TELEPHONE ENCOUNTER
Patient's vaginosis screen came back with Candida sp. Informed pt in regards to results, medication is just needing to be sent to the pharmacy [id number]. Would you fill this medication?

## 2018-05-14 NOTE — TELEPHONE ENCOUNTER
carlim for pt to call office back in regards of on vac.          Alexandra, this is Ceevettene I've received your request I'm returning your call from Dr. Angus ratliff at [facility]. I just wanted to let you know that  You are not immune to hepatitis B, we will call to get you scheduled for this vaccination.    Please relay this message to a pt.

## 2018-05-15 ENCOUNTER — PATIENT MESSAGE (OUTPATIENT)
Dept: INTERNAL MEDICINE | Facility: CLINIC | Age: 23
End: 2018-05-15

## 2018-05-15 ENCOUNTER — CLINICAL SUPPORT (OUTPATIENT)
Dept: INFECTIOUS DISEASES | Facility: CLINIC | Age: 23
End: 2018-05-15
Payer: COMMERCIAL

## 2018-05-15 PROCEDURE — 90715 TDAP VACCINE 7 YRS/> IM: CPT | Mod: S$GLB,,, | Performed by: INTERNAL MEDICINE

## 2018-05-15 PROCEDURE — 90746 HEPB VACCINE 3 DOSE ADULT IM: CPT | Mod: S$GLB,,, | Performed by: INTERNAL MEDICINE

## 2018-05-15 PROCEDURE — 90471 IMMUNIZATION ADMIN: CPT | Mod: S$GLB,,, | Performed by: INTERNAL MEDICINE

## 2018-05-15 PROCEDURE — 90472 IMMUNIZATION ADMIN EACH ADD: CPT | Mod: S$GLB,,, | Performed by: INTERNAL MEDICINE

## 2018-05-15 NOTE — PROGRESS NOTES
Pt received the Hepatitis B and Tdap vaccinations. Pt tolerated the injections well. Pt left the unit in NAD. Immunization report printed per pt request.

## 2018-05-17 ENCOUNTER — TELEPHONE (OUTPATIENT)
Dept: INTERNAL MEDICINE | Facility: CLINIC | Age: 23
End: 2018-05-17

## 2018-05-17 NOTE — TELEPHONE ENCOUNTER
Called pt LVM stating:    Alexandra, this is Elzbieta I've received your request I'm returning your call from Dr.Raveendran ratliff at [facility]. I was calling to schedule you nurse visit to receive your injections.  Please schedule nurse visit

## 2018-05-17 NOTE — TELEPHONE ENCOUNTER
----- Message from Edelmira Olea sent at 5/17/2018 11:29 AM CDT -----  Contact: FABIOLA VALENCIA [[id number]]            Name of Who is Calling: FABIOLA VALENCIA [[id number]]    What is the request in detail: Pt is calling to discuss the missed call regarding the hep b course she should be taking.  Please contact pt to further discuss and advise.      Can the clinic reply by [facility]: No      [facility] [phone]  1:18 PM  Patient states that she had her Hep B shot two days ago at the [facility]. Wanted to know if she needs to redo the Hep B course or receive a booster since only one Hep B injections was ordered.

## 2018-05-18 ENCOUNTER — PATIENT MESSAGE (OUTPATIENT)
Dept: OBSTETRICS AND GYNECOLOGY | Facility: CLINIC | Age: 23
End: 2018-05-18

## 2018-05-18 RX ORDER — FLUCONAZOLE 150 MG/1
150 TABLET ORAL ONCE
Qty: 1 TABLET | Refills: 2 | Status: SHIPPED | OUTPATIENT
Start: 2018-05-18 | End: 2018-05-18

## 2018-05-18 NOTE — TELEPHONE ENCOUNTER
Patient has been scheduled for her 1mo Hp B f/u. Wanted to wait to schedule her 6mo f/u until she sees her school schedule

## 2018-05-29 ENCOUNTER — PATIENT MESSAGE (OUTPATIENT)
Dept: OBSTETRICS AND GYNECOLOGY | Facility: CLINIC | Age: 23
End: 2018-05-29

## 2018-06-05 ENCOUNTER — TELEPHONE (OUTPATIENT)
Dept: INTERNAL MEDICINE | Facility: CLINIC | Age: 23
End: 2018-06-05

## 2018-06-18 ENCOUNTER — TELEPHONE (OUTPATIENT)
Dept: OBSTETRICS AND GYNECOLOGY | Facility: CLINIC | Age: 23
End: 2018-06-18

## 2018-06-18 ENCOUNTER — PATIENT MESSAGE (OUTPATIENT)
Dept: OBSTETRICS AND GYNECOLOGY | Facility: CLINIC | Age: 23
End: 2018-06-18

## 2018-06-19 ENCOUNTER — CLINICAL SUPPORT (OUTPATIENT)
Dept: INFECTIOUS DISEASES | Facility: CLINIC | Age: 23
End: 2018-06-19
Payer: COMMERCIAL

## 2018-06-19 PROCEDURE — 90746 HEPB VACCINE 3 DOSE ADULT IM: CPT | Mod: S$GLB,,, | Performed by: INTERNAL MEDICINE

## 2018-06-19 PROCEDURE — 90471 IMMUNIZATION ADMIN: CPT | Mod: S$GLB,,, | Performed by: INTERNAL MEDICINE

## 2018-06-19 NOTE — PROGRESS NOTES
Pt received the second dose of her Hepatitis B vaccination. Pt tolerated the injection well. Immunization report printed per pt request. Return appt provided. Pt left the unit in NAD.

## 2018-06-28 ENCOUNTER — PATIENT MESSAGE (OUTPATIENT)
Dept: OBSTETRICS AND GYNECOLOGY | Facility: CLINIC | Age: 23
End: 2018-06-28

## 2018-07-10 DIAGNOSIS — Z30.41 ENCOUNTER FOR SURVEILLANCE OF CONTRACEPTIVE PILLS: ICD-10-CM

## 2018-07-10 RX ORDER — NORETHINDRONE ACETATE AND ETHINYL ESTRADIOL 1MG-20(24)
KIT ORAL
Qty: 56 TABLET | Refills: 0 | Status: SHIPPED | OUTPATIENT
Start: 2018-07-10 | End: 2019-06-21

## 2018-08-29 ENCOUNTER — PATIENT MESSAGE (OUTPATIENT)
Dept: INTERNAL MEDICINE | Facility: CLINIC | Age: 23
End: 2018-08-29

## 2018-11-23 ENCOUNTER — CLINICAL SUPPORT (OUTPATIENT)
Dept: INFECTIOUS DISEASES | Facility: CLINIC | Age: 23
End: 2018-11-23
Payer: COMMERCIAL

## 2018-11-23 DIAGNOSIS — Z23 IMMUNIZATION DUE: ICD-10-CM

## 2018-11-23 PROCEDURE — 90746 HEPB VACCINE 3 DOSE ADULT IM: CPT | Mod: S$GLB,,, | Performed by: INTERNAL MEDICINE

## 2018-11-23 PROCEDURE — 90472 IMMUNIZATION ADMIN EACH ADD: CPT | Mod: S$GLB,,, | Performed by: INTERNAL MEDICINE

## 2018-11-23 PROCEDURE — 90471 IMMUNIZATION ADMIN: CPT | Mod: S$GLB,,, | Performed by: INTERNAL MEDICINE

## 2018-11-23 PROCEDURE — 90686 IIV4 VACC NO PRSV 0.5 ML IM: CPT | Mod: S$GLB,,, | Performed by: INTERNAL MEDICINE

## 2018-11-23 PROCEDURE — 99999 PR PBB SHADOW E&M-EST. PATIENT-LVL II: CPT | Mod: PBBFAC,,,

## 2018-11-23 NOTE — PROGRESS NOTES
Patient received Hep B and flu vaccine tolerated well. Instructed to remain in facility for 15 minutes and return if feelings of reaction occur. Left in NAD

## 2018-12-26 ENCOUNTER — OFFICE VISIT (OUTPATIENT)
Dept: FAMILY MEDICINE | Facility: CLINIC | Age: 23
End: 2018-12-26
Payer: COMMERCIAL

## 2018-12-26 VITALS
DIASTOLIC BLOOD PRESSURE: 72 MMHG | WEIGHT: 129.19 LBS | SYSTOLIC BLOOD PRESSURE: 111 MMHG | OXYGEN SATURATION: 98 % | HEART RATE: 76 BPM | HEIGHT: 67 IN | TEMPERATURE: 98 F | BODY MASS INDEX: 20.28 KG/M2 | RESPIRATION RATE: 16 BRPM

## 2018-12-26 DIAGNOSIS — J06.9 VIRAL UPPER RESPIRATORY TRACT INFECTION: Primary | ICD-10-CM

## 2018-12-26 PROCEDURE — 99213 OFFICE O/P EST LOW 20 MIN: CPT | Mod: S$GLB,,, | Performed by: FAMILY MEDICINE

## 2018-12-26 PROCEDURE — 3008F BODY MASS INDEX DOCD: CPT | Mod: CPTII,S$GLB,, | Performed by: FAMILY MEDICINE

## 2018-12-26 PROCEDURE — 99999 PR PBB SHADOW E&M-EST. PATIENT-LVL III: CPT | Mod: PBBFAC,,, | Performed by: FAMILY MEDICINE

## 2018-12-26 NOTE — PROGRESS NOTES
"Chief Complaint   Patient presents with    Nasal Congestion    Sinusitis    Sore Throat       HPI    Melinda Mccann is 23 y.o. female. The encounter diagnosis was Viral upper respiratory tract infection.    23 year old female comes to clinic with complaint of upper respiratory symptoms.  Patient reports onset of symptoms over the last 3-4 days.  She reports sick household contact.  SHe reports symptoms including nasal congestion, sore throat, and nonproductive cough.  She denies shortness of breath or wheezing.  She does admit to chills without sweats.    She reports using Dayquil, Aleve, and throat lozenges to reduce symptoms.    Review of Systems   Constitutional: Positive for chills and fatigue. Negative for activity change, diaphoresis and fever.   HENT: Positive for congestion, postnasal drip, rhinorrhea and sore throat. Negative for ear discharge, ear pain, sinus pressure, sinus pain, tinnitus and trouble swallowing.    Respiratory: Positive for cough. Negative for chest tightness, shortness of breath and wheezing.            Current Outpatient Medications:     cetirizine (ZYRTEC) 10 MG tablet, TK 1 T PO QD, Disp: , Rfl: 5    lisdexamfetamine (VYVANSE) 40 MG Cap, Take 1 capsule (40 mg total) by mouth once daily., Disp: 30 capsule, Rfl: 0    norethindrone-e.estradiol-iron 1 mg-20 mcg (24)/75 mg (4) Oral per tablet, Take 1 tablet by mouth once daily., Disp: 30 tablet, Rfl: 11    BLISOVI 24 FE 1 mg-20 mcg (24)/75 mg (4) per tablet, TAKE 1 TABLET BY MOUTH DAILY, Disp: 56 tablet, Rfl: 0    sumatriptan (IMITREX) 25 MG Tab, Take 1 tablet (25 mg total) by mouth every 8 (eight) hours as needed., Disp: 12 tablet, Rfl: 1      Blood pressure 111/72, pulse 76, temperature 98.1 °F (36.7 °C), temperature source Oral, resp. rate 16, height 5' 7" (1.702 m), weight 58.6 kg (129 lb 3 oz), SpO2 98 %.    Physical Exam   Constitutional: Vital signs are normal. She appears well-developed.   HENT:   Head: Head is without " right periorbital erythema and without left periorbital erythema.   Right Ear: Tympanic membrane is bulging. No middle ear effusion.   Left Ear: Tympanic membrane is bulging.  No middle ear effusion.   Mouth/Throat: Posterior oropharyngeal erythema present. No oropharyngeal exudate or posterior oropharyngeal edema.   Cardiovascular: Normal heart sounds.   No murmur heard.  Pulmonary/Chest: Effort normal and breath sounds normal.   Psychiatric: She has a normal mood and affect. Her behavior is normal.       No visits with results within 3 Month(s) from this visit.   Latest known visit with results is:   Lab Visit on 05/10/2018   Component Date Value Ref Range Status    NIL 05/10/2018 0.048  See text IU/mL Final    TB Antigen 05/10/2018 0.049  See text IU/mL Final    TB Antigen - Nil 05/10/2018 0.001  See Text IU/mL Final    Mitogen - Nil 05/10/2018 >10.000  See text IU/mL Final    TB Gold 05/10/2018 Negative   Final   ]    Assessment:    1. Viral upper respiratory tract infection          Melinda was seen today for nasal congestion, sinusitis and sore throat.    Diagnoses and all orders for this visit:    Viral upper respiratory tract infection   -New problem.  Symptoms most consistent viral sinusitis.  Patient provided written OTC regimen for symptom reduction.        FOLLOW UP: Follow-up in about 1 week (around 1/2/2019), or if symptoms worsen or fail to improve.

## 2018-12-26 NOTE — PROGRESS NOTES
"Chief Complaint   Patient presents with    Nasal Congestion    Sinusitis    Sore Throat       HPI    Melinda Mccann is 23 y.o. female. The encounter diagnosis was Viral upper respiratory tract infection.      Review of Systems        Current Outpatient Medications:     cetirizine (ZYRTEC) 10 MG tablet, TK 1 T PO QD, Disp: , Rfl: 5    lisdexamfetamine (VYVANSE) 40 MG Cap, Take 1 capsule (40 mg total) by mouth once daily., Disp: 30 capsule, Rfl: 0    norethindrone-e.estradiol-iron 1 mg-20 mcg (24)/75 mg (4) Oral per tablet, Take 1 tablet by mouth once daily., Disp: 30 tablet, Rfl: 11    BLISOVI 24 FE 1 mg-20 mcg (24)/75 mg (4) per tablet, TAKE 1 TABLET BY MOUTH DAILY, Disp: 56 tablet, Rfl: 0    sumatriptan (IMITREX) 25 MG Tab, Take 1 tablet (25 mg total) by mouth every 8 (eight) hours as needed., Disp: 12 tablet, Rfl: 1      Blood pressure 111/72, pulse 76, temperature 98.1 °F (36.7 °C), temperature source Oral, resp. rate 16, height 5' 7" (1.702 m), weight 58.6 kg (129 lb 3 oz), SpO2 98 %.    Physical Exam    No visits with results within 3 Month(s) from this visit.   Latest known visit with results is:   Lab Visit on 05/10/2018   Component Date Value Ref Range Status    NIL 05/10/2018 0.048  See text IU/mL Final    TB Antigen 05/10/2018 0.049  See text IU/mL Final    TB Antigen - Nil 05/10/2018 0.001  See Text IU/mL Final    Mitogen - Nil 05/10/2018 >10.000  See text IU/mL Final    TB Gold 05/10/2018 Negative   Final   ]    Assessment:    1. Viral upper respiratory tract infection          Melinda was seen today for nasal congestion, sinusitis and sore throat.    Diagnoses and all orders for this visit:    Viral upper respiratory tract infection          FOLLOW UP: Follow-up in about 1 week (around 1/2/2019), or if symptoms worsen or fail to improve.    "

## 2019-03-30 ENCOUNTER — OFFICE VISIT (OUTPATIENT)
Dept: FAMILY MEDICINE | Facility: CLINIC | Age: 24
End: 2019-03-30
Payer: COMMERCIAL

## 2019-03-30 VITALS
TEMPERATURE: 99 F | DIASTOLIC BLOOD PRESSURE: 72 MMHG | BODY MASS INDEX: 20.1 KG/M2 | HEIGHT: 67 IN | WEIGHT: 128.06 LBS | HEART RATE: 81 BPM | OXYGEN SATURATION: 98 % | SYSTOLIC BLOOD PRESSURE: 118 MMHG

## 2019-03-30 DIAGNOSIS — L30.9 DERMATITIS OF BOTH FEET: Primary | ICD-10-CM

## 2019-03-30 PROCEDURE — 99999 PR PBB SHADOW E&M-EST. PATIENT-LVL IV: ICD-10-PCS | Mod: PBBFAC,,, | Performed by: NURSE PRACTITIONER

## 2019-03-30 PROCEDURE — 99214 OFFICE O/P EST MOD 30 MIN: CPT | Mod: S$GLB,,, | Performed by: NURSE PRACTITIONER

## 2019-03-30 PROCEDURE — 99214 PR OFFICE/OUTPT VISIT, EST, LEVL IV, 30-39 MIN: ICD-10-PCS | Mod: S$GLB,,, | Performed by: NURSE PRACTITIONER

## 2019-03-30 PROCEDURE — 99999 PR PBB SHADOW E&M-EST. PATIENT-LVL IV: CPT | Mod: PBBFAC,,, | Performed by: NURSE PRACTITIONER

## 2019-03-30 PROCEDURE — 3008F BODY MASS INDEX DOCD: CPT | Mod: CPTII,S$GLB,, | Performed by: NURSE PRACTITIONER

## 2019-03-30 PROCEDURE — 3008F PR BODY MASS INDEX (BMI) DOCUMENTED: ICD-10-PCS | Mod: CPTII,S$GLB,, | Performed by: NURSE PRACTITIONER

## 2019-03-30 RX ORDER — TRIAMCINOLONE ACETONIDE 1 MG/G
CREAM TOPICAL 2 TIMES DAILY
Qty: 45 G | Refills: 0 | Status: SHIPPED | OUTPATIENT
Start: 2019-03-30 | End: 2019-07-24

## 2019-03-30 NOTE — PATIENT INSTRUCTIONS
Nonspecific Dermatitis  Dermatitis is a skin rash caused by something that touches the skin and makes it irritated and inflamed.  Your skin may be red, swollen, dry, and may be cracked. Blisters may form and ooze. The rash will itch.  Dermatitis can form on the face and neck, backs of hands, forearms, genitals, and lower legs. Dermatitis is not passed from person to person.  Talk with your health care provider about what may have caused the rash. Common things that cause skin allergies are metal in jewelry, plants like poison ivy or poison oak, and certain skin care products. You will need to avoid the source of your rash in the future to prevent it from coming back. In some cases, the cause of the dermatitis may not be found.  Treatment is done to relieve itching and prevent the rash from coming back. The rash should go away in a few days to a few weeks.  Home care  The health care provider may prescribe medications to relieve swelling and itching. Follow all instructions when using these medications.  · Avoid anything that heats up your skin, such as hot showers or baths, or direct sunlight. This can make itching worse.  · Stay away from whatever you think caused the rash.  · Bathe in warm, not hot, water. Apply a moisturizing lotion after bathing to prevent dry skin.  · Avoid skin irritants such as wool or silk clothing, grease, oils, harsh soaps, and detergents.  · Apply cold compresses to soothe your sores to help relieve your symptoms. Do this for 30 minutes 3 to 4 times a day. You can make a cold compress by soaking a cloth in cold water. Squeeze out excess water. You can add colloidal oatmeal to the water to help reduce itching. For severe itching in a small area, apply an ice pack wrapped in a thin towel. Do this for 20 minutes 3 to 4 times a day.  · You can also help relieve large areas of itching by taking a lukewarm bath with colloidal oatmeal added to the water.  · Use hydrocortisone cream for redness  and irritation, unless another medicine was prescribed. You can also use benzocaine anesthetic cream or spray.  · Use oral diphenhydramine to help reduce itching. This is an antihistamine you can buy at drug and grocery stores. It can make you sleepy, so use lower doses during the daytime. Or you can use loratadine. This is an antihistamine that will not make you sleepy. Dont use diphenhydramine if you have glaucoma or have trouble urinating because of an enlarged prostate.  · Wash your hands or use an antibacterial gel often to prevent the spread of the rash.  Follow-up care  Follow up with your health care provider. Make an appointment with your health care provider if your symptoms do not get better in the next 1 to 2 weeks.  When to seek medical advice  Call your health care provider right away if any of these occur:  · Spreading of the rash to other parts of your body  · Severe swelling of your face, eyelids, mouth, throat or tongue  · Trouble urinating due to swelling in the genital area  · Fever of 100.4°F (38°C) or higher  · Redness or swelling that gets worse  · Pain that gets worse  · Foul-smelling fluid leaking from the skin  · Yellow-brown crusts on the open blisters  · Joint pain   Date Last Reviewed: 7/23/2014  © 6749-9152 [employer] [address] [place] [address], [place] [address]. All rights reserved. This information is not intended as a substitute for professional medical care. Always follow your healthcare professional's instructions.

## 2019-03-30 NOTE — PROGRESS NOTES
Subjective:       Patient ID: Melinda Mccann is a 23 y.o. female.    Chief Complaint: Rash (pt states red rash on legs and feet,denies itching and pain.)    Rash   This is a new problem. The current episode started in the past 7 days. The problem is unchanged. The affected locations include the left foot and right foot. The rash is characterized by redness (bumps). It is unknown if there was an exposure to a precipitant. Pertinent negatives include no anorexia, congestion, cough, diarrhea, eye pain, facial edema, fatigue, fever, joint pain, nail changes, rhinorrhea, shortness of breath, sore throat or vomiting. Past treatments include nothing.     Review of Systems   Constitutional: Negative for fatigue and fever.   HENT: Negative for congestion, rhinorrhea and sore throat.    Eyes: Negative for pain.   Respiratory: Negative for cough and shortness of breath.    Gastrointestinal: Negative for anorexia, diarrhea and vomiting.   Musculoskeletal: Negative for joint pain.   Skin: Positive for rash. Negative for nail changes.       Objective:      Physical Exam   Constitutional: She is oriented to person, place, and time. Vital signs are normal. She appears well-developed and well-nourished.   HENT:   Head: Atraumatic.   Cardiovascular: Normal rate, regular rhythm and normal heart sounds.   Pulmonary/Chest: Effort normal and breath sounds normal.   Neurological: She is alert and oriented to person, place, and time.   Skin: Skin is warm, dry and intact. Capillary refill takes less than 2 seconds. Rash (bilateral feet ) noted. Rash is maculopapular.   Psychiatric: She has a normal mood and affect.       Assessment:       1. Dermatitis of both feet        Plan:       Melinda was seen today for rash.    Diagnoses and all orders for this visit:    Dermatitis of both feet  -     triamcinolone acetonide 0.1% (KENALOG) 0.1 % cream; Apply topically 2 (two) times daily.  -     Ambulatory referral to Dermatology    Home  care  The health care provider may prescribe medications to relieve swelling and itching. Follow all instructions when using these medications.  · Avoid anything that heats up your skin, such as hot showers or baths, or direct sunlight. This can make itching worse.  · Stay away from whatever you think caused the rash.  · Bathe in warm, not hot, water. Apply a moisturizing lotion after bathing to prevent dry skin.  · Avoid skin irritants such as wool or silk clothing, grease, oils, harsh soaps, and detergents.  · Apply cold compresses to soothe your sores to help relieve your symptoms. Do this for 30 minutes 3 to 4 times a day. You can make a cold compress by soaking a cloth in cold water. Squeeze out excess water. You can add colloidal oatmeal to the water to help reduce itching. For severe itching in a small area, apply an ice pack wrapped in a thin towel. Do this for 20 minutes 3 to 4 times a day.  · You can also help relieve large areas of itching by taking a lukewarm bath with colloidal oatmeal added to the water.  · Use hydrocortisone cream for redness and irritation, unless another medicine was prescribed. You can also use benzocaine anesthetic cream or spray.  · Use oral diphenhydramine to help reduce itching. This is an antihistamine you can buy at drug and grocery stores. It can make you sleepy, so use lower doses during the daytime. Or you can use loratadine. This is an antihistamine that will not make you sleepy. Dont use diphenhydramine if you have glaucoma or have trouble urinating because of an enlarged prostate.  · Wash your hands or use an antibacterial gel often to prevent the spread of the rash.  Follow-up care  Follow up with your health care provider. Make an appointment with your health care provider if your symptoms do not get better in the next 1 to 2 weeks.  When to seek medical advice  Call your health care provider right away if any of these occur:  · Spreading of the rash to other parts  of your body  · Severe swelling of your face, eyelids, mouth, throat or tongue  · Trouble urinating due to swelling in the genital area  · Fever of 100.4°F (38°C) or higher  · Redness or swelling that gets worse  · Pain that gets worse  · Foul-smelling fluid leaking from the skin  · Yellow-brown crusts on the open blisters  · Joint pain   Date Last Reviewed: 7/23/2014  © 5681-5950 [employer]. [address] [place] [address]. All rights reserved. This information is not intended as a substitute for professional medical care. Always follow your healthcare professional's instructions.

## 2019-04-29 ENCOUNTER — TELEPHONE (OUTPATIENT)
Dept: FAMILY MEDICINE | Facility: CLINIC | Age: 24
End: 2019-04-29

## 2019-04-29 NOTE — LETTER
April 29, 2019    Melinda Mccann  [address] [facility] [place] [address]             [facility]  [address] [facility] [place] [address]  Phone: [phone]  Fax: [phone] Dear Ms. Ramy:    Sorry we were unable to contact you to schedule your Dermatology appointment. Please give the referral department a call at [phone].      If you have any questions or concerns, please don't hesitate to call.    Sincerely,        Juanita Cheng MA

## 2019-05-13 RX ORDER — NORETHINDRONE ACETATE AND ETHINYL ESTRADIOL AND FERROUS FUMARATE 1MG-20(24)
KIT ORAL
Qty: 28 TABLET | Refills: 6 | Status: SHIPPED | OUTPATIENT
Start: 2019-05-13 | End: 2022-04-22

## 2019-06-19 ENCOUNTER — OFFICE VISIT (OUTPATIENT)
Dept: INTERNAL MEDICINE | Facility: CLINIC | Age: 24
End: 2019-06-19
Attending: FAMILY MEDICINE
Payer: COMMERCIAL

## 2019-06-19 VITALS
HEART RATE: 100 BPM | BODY MASS INDEX: 20.28 KG/M2 | WEIGHT: 129.19 LBS | DIASTOLIC BLOOD PRESSURE: 80 MMHG | SYSTOLIC BLOOD PRESSURE: 120 MMHG | HEIGHT: 67 IN | OXYGEN SATURATION: 98 %

## 2019-06-19 DIAGNOSIS — Z00.00 ANNUAL PHYSICAL EXAM: Primary | ICD-10-CM

## 2019-06-19 DIAGNOSIS — R21 RASH: ICD-10-CM

## 2019-06-19 PROCEDURE — 99395 PREV VISIT EST AGE 18-39: CPT | Mod: S$GLB,,, | Performed by: FAMILY MEDICINE

## 2019-06-19 PROCEDURE — 99999 PR PBB SHADOW E&M-EST. PATIENT-LVL III: ICD-10-PCS | Mod: PBBFAC,,, | Performed by: FAMILY MEDICINE

## 2019-06-19 PROCEDURE — 99395 PR PREVENTIVE VISIT,EST,18-39: ICD-10-PCS | Mod: S$GLB,,, | Performed by: FAMILY MEDICINE

## 2019-06-19 PROCEDURE — 99999 PR PBB SHADOW E&M-EST. PATIENT-LVL III: CPT | Mod: PBBFAC,,, | Performed by: FAMILY MEDICINE

## 2019-06-19 RX ORDER — LISDEXAMFETAMINE DIMESYLATE 60 MG/1
60 CAPSULE ORAL EVERY MORNING
COMMUNITY
End: 2022-03-08 | Stop reason: DRUGHIGH

## 2019-06-19 RX ORDER — TRIAMCINOLONE ACETONIDE 5 MG/G
CREAM TOPICAL 2 TIMES DAILY PRN
Qty: 30 G | Refills: 0 | Status: SHIPPED | OUTPATIENT
Start: 2019-06-19 | End: 2019-07-24

## 2019-06-19 NOTE — PROGRESS NOTES
"Subjective:      Patient ID: Melinda Mccann is a 24 y.o. female.    Chief Complaint: Annual Exam    HPI   Patient here today for annual exam. Dr. Bourne is her psychiatrist in [place], [place]. Allergies are better. Headaches once a every 2 months   5 days ago rash on chest not itchy. She has tried cortisone and this has helped. 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 She reports mood is good, interest in hobbies there, no SI or HI.       Review of Systems   Constitutional: Negative for activity change, appetite change, chills, diaphoresis, fatigue, fever and unexpected weight change.   HENT: Negative for congestion, ear discharge, ear pain, hearing loss, postnasal drip, rhinorrhea, sinus pressure and sore throat.    Respiratory: Negative for cough, shortness of breath and wheezing.    Cardiovascular: Negative for chest pain.   Gastrointestinal: Negative for abdominal pain, constipation, diarrhea, nausea and vomiting.   Genitourinary: Negative for dysuria and frequency.   Musculoskeletal: Negative.    Psychiatric/Behavioral: Negative for suicidal ideas.     I personally reviewed Past Medical History, Past Surgical history,  Past Social History and Family History      Objective:   /80 (BP Location: Left arm, Patient Position: Sitting)   Pulse 100   Ht 5' 7" (1.702 m)   Wt 58.6 kg (129 lb 3 oz)   SpO2 98%   BMI 20.23 kg/m²     Physical Exam   Constitutional: She is oriented to person, place, and time. She appears well-developed and well-nourished. No distress.   HENT:   Head: Normocephalic and atraumatic.   Right Ear: Hearing, tympanic membrane, external ear and ear canal normal.   Left Ear: Hearing, tympanic membrane, external ear and ear canal normal.   Nose: Nose normal.   Mouth/Throat: Uvula is midline and oropharynx is clear and moist. No oropharyngeal exudate.   Eyes: Pupils are equal, round, and reactive to light. Conjunctivae and EOM are normal. Right eye exhibits no " discharge. Left eye exhibits no discharge. No scleral icterus.   Neck: Normal range of motion. Neck supple.   Cardiovascular: Normal rate, regular rhythm, normal heart sounds and intact distal pulses. Exam reveals no gallop.   No murmur heard.  Pulmonary/Chest: Effort normal and breath sounds normal. No respiratory distress. She has no wheezes. She has no rales. She exhibits no tenderness.   Abdominal: Soft. Bowel sounds are normal. She exhibits no distension and no mass. There is no tenderness. There is no rebound and no guarding.   Neurological: She is alert and oriented to person, place, and time.   Skin: Skin is warm and dry.   Vitals reviewed.      1. Annual physical exam    2. Rash        1. Declined labs, is exercising regularly   2. Trial triamcinolone and call if no improvement      No orders of the defined types were placed in this encounter.    Medications Ordered This Encounter   Medications    triamcinolone acetonide 0.5% (KENALOG) 0.5 % Crea     Sig: Apply topically 2 (two) times daily as needed.     Dispense:  30 g     Refill:  0

## 2019-06-21 ENCOUNTER — OFFICE VISIT (OUTPATIENT)
Dept: OBSTETRICS AND GYNECOLOGY | Facility: CLINIC | Age: 24
End: 2019-06-21
Attending: OBSTETRICS & GYNECOLOGY
Payer: COMMERCIAL

## 2019-06-21 VITALS
SYSTOLIC BLOOD PRESSURE: 100 MMHG | HEIGHT: 67 IN | WEIGHT: 126.88 LBS | DIASTOLIC BLOOD PRESSURE: 60 MMHG | BODY MASS INDEX: 19.91 KG/M2

## 2019-06-21 DIAGNOSIS — Z30.9 ENCOUNTER FOR CONTRACEPTIVE MANAGEMENT, UNSPECIFIED TYPE: ICD-10-CM

## 2019-06-21 DIAGNOSIS — Z01.419 WELL WOMAN EXAM WITH ROUTINE GYNECOLOGICAL EXAM: ICD-10-CM

## 2019-06-21 DIAGNOSIS — Z30.014 ENCOUNTER FOR INITIAL PRESCRIPTION OF INTRAUTERINE CONTRACEPTIVE DEVICE (IUD): Primary | ICD-10-CM

## 2019-06-21 DIAGNOSIS — N89.8 VAGINAL DISCHARGE: ICD-10-CM

## 2019-06-21 PROCEDURE — 99395 PR PREVENTIVE VISIT,EST,18-39: ICD-10-PCS | Mod: S$GLB,,, | Performed by: OBSTETRICS & GYNECOLOGY

## 2019-06-21 PROCEDURE — 87491 CHLMYD TRACH DNA AMP PROBE: CPT

## 2019-06-21 PROCEDURE — 87480 CANDIDA DNA DIR PROBE: CPT

## 2019-06-21 PROCEDURE — 87510 GARDNER VAG DNA DIR PROBE: CPT

## 2019-06-21 PROCEDURE — 99395 PREV VISIT EST AGE 18-39: CPT | Mod: S$GLB,,, | Performed by: OBSTETRICS & GYNECOLOGY

## 2019-06-21 RX ORDER — MISOPROSTOL 200 UG/1
200 TABLET ORAL DAILY
Qty: 2 TABLET | Refills: 0 | Status: SHIPPED | OUTPATIENT
Start: 2019-06-21 | End: 2022-05-11 | Stop reason: ALTCHOICE

## 2019-06-21 RX ORDER — MULTIVITAMIN
1 TABLET ORAL DAILY
COMMUNITY

## 2019-06-22 LAB
BACTERIAL VAGINOSIS DNA: NEGATIVE
CANDIDA GLABRATA DNA: NEGATIVE
CANDIDA KRUSEI DNA: NEGATIVE
CANDIDA RRNA VAG QL PROBE: NEGATIVE
T VAGINALIS RRNA GENITAL QL PROBE: NEGATIVE

## 2019-06-23 LAB
C TRACH DNA SPEC QL NAA+PROBE: NOT DETECTED
N GONORRHOEA DNA SPEC QL NAA+PROBE: NOT DETECTED

## 2019-06-24 DIAGNOSIS — Z30.9 ENCOUNTER FOR CONTRACEPTIVE MANAGEMENT, UNSPECIFIED TYPE: Primary | ICD-10-CM

## 2019-06-27 ENCOUNTER — TELEPHONE (OUTPATIENT)
Dept: PHARMACY | Facility: CLINIC | Age: 24
End: 2019-06-27

## 2019-06-27 NOTE — TELEPHONE ENCOUNTER
LVM to notify pt Kyleena is covered with $0.00 copay on pharmacy benefit. Need to offer pharmacist consultation and obtain consent to deliver to [facility]. Will follow up if no return call is received.

## 2019-07-09 ENCOUNTER — TELEPHONE (OUTPATIENT)
Dept: OBSTETRICS AND GYNECOLOGY | Facility: CLINIC | Age: 24
End: 2019-07-09

## 2019-07-09 NOTE — TELEPHONE ENCOUNTER
----- Message from Rosenda Romero MA sent at 7/9/2019  2:34 PM CDT -----  Contact: Self  Pt is calling to check the status of her IUD.  Pt states that she is looking to have everything completed before she goes back to school on 7/25.  The pt can be reached at [phone].  Thanks FL      Returned patient call, schedule patient for IUD insertion.

## 2019-07-17 NOTE — TELEPHONE ENCOUNTER
Patient returned call, she had no questions and declined consult. She is ready for her appointment on 7/24 to get her Kyleena administered. Will contact administration location to verify address and date for delivery.    Heron Villanueva, PharmD  Clinical Pharmacist  [facility]  P: [phone]

## 2019-07-24 ENCOUNTER — PROCEDURE VISIT (OUTPATIENT)
Dept: OBSTETRICS AND GYNECOLOGY | Facility: CLINIC | Age: 24
End: 2019-07-24
Attending: OBSTETRICS & GYNECOLOGY
Payer: COMMERCIAL

## 2019-07-24 VITALS
BODY MASS INDEX: 21.11 KG/M2 | WEIGHT: 134.5 LBS | DIASTOLIC BLOOD PRESSURE: 60 MMHG | HEIGHT: 67 IN | SYSTOLIC BLOOD PRESSURE: 108 MMHG

## 2019-07-24 DIAGNOSIS — Z30.9 ENCOUNTER FOR CONTRACEPTIVE MANAGEMENT, UNSPECIFIED TYPE: Primary | ICD-10-CM

## 2019-07-24 DIAGNOSIS — Z30.430 ENCOUNTER FOR IUD INSERTION: ICD-10-CM

## 2019-07-24 LAB
B-HCG UR QL: NEGATIVE
CTP QC/QA: YES

## 2019-07-24 PROCEDURE — 58300 INSERT INTRAUTERINE DEVICE: CPT | Mod: S$GLB,,, | Performed by: OBSTETRICS & GYNECOLOGY

## 2019-07-24 PROCEDURE — 81025 URINE PREGNANCY TEST: CPT | Mod: S$GLB,,, | Performed by: OBSTETRICS & GYNECOLOGY

## 2019-07-24 PROCEDURE — 58300 INSERTION OF IUD: ICD-10-PCS | Mod: S$GLB,,, | Performed by: OBSTETRICS & GYNECOLOGY

## 2019-07-24 PROCEDURE — 81025 POCT URINE PREGNANCY: ICD-10-PCS | Mod: S$GLB,,, | Performed by: OBSTETRICS & GYNECOLOGY

## 2019-07-24 NOTE — PROCEDURES
Insertion of IUD  Date/Time: 7/24/2019 1:58 PM  Performed by: Marleni Armas DO  Authorized by: Marleni Armas DO     Consent:     Consent obtained:  Written    Consent given by:  Patient    Procedure risks and benefits discussed: yes      Patient questions answered: yes      Patient agrees, verbalizes understanding, and wants to proceed: yes      Educational handouts given: yes      Instructions and paperwork completed: yes    Procedure:     Pelvic exam performed: yes      Negative GC/chlamydia test: prior to this visit.      Negative urine pregnancy test: yes      Cervix cleaned and prepped: yes      Speculum placed in vagina: yes      Tenaculum applied to cervix: yes      Uterus sounded: yes      Uterus sound depth (cm):  7    IUD inserted with no complications: yes      IUD type:  Kyleena    Strings trimmed: yes (2.5cm)    Post-procedure:     Patient tolerated procedure well: yes      Patient will follow up after next period: yes    Comments:      Ultrasound tomorrow for location check, [place] on fr[place]

## 2019-07-25 ENCOUNTER — HOSPITAL ENCOUNTER (OUTPATIENT)
Dept: RADIOLOGY | Facility: OTHER | Age: 24
Discharge: HOME | End: 2019-07-25
Attending: OBSTETRICS & GYNECOLOGY
Payer: COMMERCIAL

## 2019-07-25 DIAGNOSIS — Z30.430 ENCOUNTER FOR IUD INSERTION: ICD-10-CM

## 2019-07-25 PROCEDURE — 76830 TRANSVAGINAL US NON-OB: CPT | Mod: 26,,, | Performed by: RADIOLOGY

## 2019-07-25 PROCEDURE — 76856 US EXAM PELVIC COMPLETE: CPT | Mod: 26,,, | Performed by: RADIOLOGY

## 2019-07-25 PROCEDURE — 76856 US PELVIS COMP WITH TRANSVAG NON-OB (XPD): ICD-10-PCS | Mod: 26,,, | Performed by: RADIOLOGY

## 2019-07-25 PROCEDURE — 76830 US PELVIS COMP WITH TRANSVAG NON-OB (XPD): ICD-10-PCS | Mod: 26,,, | Performed by: RADIOLOGY

## 2019-07-25 PROCEDURE — 76830 TRANSVAGINAL US NON-OB: CPT | Mod: TC

## 2019-08-19 NOTE — PROGRESS NOTES
CC: Well woman exam    Melinda Mccann is a 24 y.o. female  presents for well woman exam.  LMP: Patient's last menstrual period was 2019..  No issues, problems, or complaints.  wouldl kelly STD testing. Stable partner.  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  wouldl [place] to get IUD prior to going back, ordered today. Discussed cytotec prior to placement.    Past Medical History:   Diagnosis Date    Accessory navicular bone of foot     right    ADD (attention deficit disorder)     Cancer     Dysplastic nevus     Still's murmur      Past Surgical History:   Procedure Laterality Date    COLONOSCOPY N/A 2016    Performed by ROSY Ferguson [facility] at [facility] ([facility])    WISDOM TOOTH EXTRACTION       Social History     Socioeconomic History    Marital status: Single     Spouse name: Not on file    Number of children: Not on file    Years of education: Not on file    Highest education level: Not on file   Occupational History    Occupation: hospital scribe   Social Needs    Financial resource strain: Not on file    Food insecurity:     Worry: Not on file     Inability: Not on file    Transportation needs:     Medical: Not on file     Non-medical: Not on file   Tobacco Use    Smoking status: Never Smoker    Smokeless tobacco: Never Used   Substance and Sexual Activity    Alcohol use: Yes     Alcohol/week: 0.0 oz    Drug use: No    Sexual activity: Yes     Partners: Male     Birth control/protection: Condom, OCP   Lifestyle    Physical activity:     Days per week: Not on file     Minutes per session: Not on file    Stress: Not on file   Relationships    Social connections:     Talks on phone: Not on file     Gets together: Not on file     Attends [employer] service: Not on file     Active member of club or organization: Not on file     Attends meetings of clubs or organizations: Not on file     Relationship status: Not on file   Other Topics  "Concern    Are you pregnant or think you may be? No    Breast-feeding No   Social History Narrative    Not on file     Family History   Problem Relation Age of Onset    Mitral valve prolapse Mother     Hyperlipidemia Mother     Osteoporosis Mother     Stroke Maternal Aunt         maternal great aunt    Breast cancer Neg Hx     Colon cancer Neg Hx     Ovarian cancer Neg Hx     Diabetes Neg Hx     Hypertension Neg Hx     Melanoma Neg Hx      OB History        0    Para   0    Term   0       0    AB   0    Living   0       SAB   0    TAB   0    Ectopic   0    Multiple   0    Live Births                     /60   Ht 5' 7" (1.702 m)   Wt 57.6 kg (126 lb 14 oz)   LMP 2019   BMI 19.87 kg/m²       ROS:  GENERAL: Denies weight gain or weight loss. Feeling well overall.   SKIN: Denies rash or lesions.   HEAD: Denies head injury or headache.   NODES: Denies enlarged lymph nodes.   CHEST: Denies chest pain or shortness of breath.   CARDIOVASCULAR: Denies palpitations or left sided chest pain.   ABDOMEN: No abdominal pain, constipation, diarrhea, nausea, vomiting or rectal bleeding.   URINARY: No frequency, dysuria, hematuria, or burning on urination.  REPRODUCTIVE: See HPI.   BREASTS: The patient performs breast self-examination and denies pain, lumps, or nipple discharge.   HEMATOLOGIC: No easy bruisability or excessive bleeding.   MUSCULOSKELETAL: Denies joint pain or swelling.   NEUROLOGIC: Denies syncope or weakness.   PSYCHIATRIC: Denies depression, anxiety or mood swings.    PHYSICAL EXAM:  APPEARANCE: Well nourished, well developed, in no acute distress.  AFFECT: WNL, alert and oriented x 3  SKIN: No acne or hirsutism  NECK: Neck symmetric without masses or thyromegaly  NODES: No inguinal, cervical, axillary, or femoral lymph node enlargement  CHEST: Good respiratory effect  ABDOMEN: Soft.  No tenderness or masses.  No hepatosplenomegaly.  No hernias.  BREASTS: Symmetrical, no " skin changes or visible lesions.  No palpable masses, nipple discharge bilaterally.  PELVIC: Normal external genitalia without lesions.  Normal hair distribution.  Adequate perineal body, normal urethral meatus.  Vagina moist and well rugated without lesions or discharge.  Cervix pink, without lesions, discharge or tenderness.  No significant cystocele or rectocele.  Bimanual exam shows uterus to be normal size, regular, mobile and nontender.  Adnexa without masses or tenderness.    EXTREMITIES: No edema.    Encounter for initial prescription of intrauterine contraceptive device (IUD)  -     miSOPROStol (CYTOTEC) 200 MCG Tab; Take 1 tablet (200 mcg total) by mouth once daily. for 2 days  Dispense: 2 tablet; Refill: 0    Vaginal discharge  -     Vaginosis Screen by DNA Probe  -     C. trachomatis/N. gonorrhoeae by AMP DNA    Well woman exam with routine gynecological exam    Encounter for contraceptive management, unspecified type  -     Device Authorization Order            Patient was counseled today on A.C.S. Pap guidelines and recommendations for yearly pelvic exams, mammograms and monthly self breast exams; to see her PCP for other health maintenance.     No follow-ups on file.

## 2019-09-08 ENCOUNTER — PATIENT MESSAGE (OUTPATIENT)
Dept: OBSTETRICS AND GYNECOLOGY | Facility: CLINIC | Age: 24
End: 2019-09-08

## 2019-09-09 ENCOUNTER — PATIENT MESSAGE (OUTPATIENT)
Dept: DERMATOLOGY | Facility: CLINIC | Age: 24
End: 2019-09-09

## 2019-09-10 ENCOUNTER — PATIENT MESSAGE (OUTPATIENT)
Dept: INTERNAL MEDICINE | Facility: CLINIC | Age: 24
End: 2019-09-10

## 2019-09-10 RX ORDER — TRETINOIN 0.25 MG/G
1 CREAM TOPICAL NIGHTLY
Qty: 45 G | Refills: 0 | Status: SHIPPED | OUTPATIENT
Start: 2019-09-10 | End: 2019-12-31

## 2019-09-12 RX ORDER — DAPSONE 50 MG/G
GEL TOPICAL
Qty: 60 G | Refills: 3 | Status: SHIPPED | OUTPATIENT
Start: 2019-09-12 | End: 2022-05-11

## 2019-12-31 RX ORDER — TRETINOIN 0.25 MG/G
CREAM TOPICAL NIGHTLY
Qty: 45 G | Refills: 0 | Status: SHIPPED | OUTPATIENT
Start: 2019-12-31 | End: 2022-06-20

## 2021-07-02 ENCOUNTER — TELEPHONE (OUTPATIENT)
Dept: INTERNAL MEDICINE | Facility: CLINIC | Age: 26
End: 2021-07-02

## 2022-01-10 ENCOUNTER — LAB VISIT (OUTPATIENT)
Dept: PRIMARY CARE CLINIC | Facility: CLINIC | Age: 27
End: 2022-01-10
Payer: COMMERCIAL

## 2022-01-10 ENCOUNTER — PATIENT MESSAGE (OUTPATIENT)
Dept: ADMINISTRATIVE | Facility: OTHER | Age: 27
End: 2022-01-10
Payer: COMMERCIAL

## 2022-01-10 DIAGNOSIS — Z20.822 CONTACT WITH AND (SUSPECTED) EXPOSURE TO COVID-19: ICD-10-CM

## 2022-01-10 LAB
CTP QC/QA: YES
SARS-COV-2 AG RESP QL IA.RAPID: POSITIVE

## 2022-01-10 PROCEDURE — 87811 SARS-COV-2 COVID19 W/OPTIC: CPT

## 2022-01-15 DIAGNOSIS — U07.1 COVID-19 VIRUS DETECTED: ICD-10-CM

## 2022-03-03 ENCOUNTER — TELEPHONE (OUTPATIENT)
Dept: FAMILY MEDICINE | Facility: CLINIC | Age: 27
End: 2022-03-03
Payer: COMMERCIAL

## 2022-03-08 ENCOUNTER — OFFICE VISIT (OUTPATIENT)
Dept: FAMILY MEDICINE | Facility: CLINIC | Age: 27
End: 2022-03-08
Payer: COMMERCIAL

## 2022-03-08 VITALS
HEART RATE: 88 BPM | OXYGEN SATURATION: 99 % | SYSTOLIC BLOOD PRESSURE: 118 MMHG | DIASTOLIC BLOOD PRESSURE: 80 MMHG | HEIGHT: 67 IN | TEMPERATURE: 98 F | BODY MASS INDEX: 20.98 KG/M2 | WEIGHT: 133.69 LBS

## 2022-03-08 DIAGNOSIS — Z00.00 WELLNESS EXAMINATION: ICD-10-CM

## 2022-03-08 DIAGNOSIS — F90.0 ATTENTION DEFICIT HYPERACTIVITY DISORDER (ADHD), PREDOMINANTLY INATTENTIVE TYPE: ICD-10-CM

## 2022-03-08 DIAGNOSIS — F43.22 ADJUSTMENT DISORDER WITH ANXIOUS MOOD: ICD-10-CM

## 2022-03-08 DIAGNOSIS — Z00.00 ENCOUNTER FOR MEDICAL EXAMINATION TO ESTABLISH CARE: Primary | ICD-10-CM

## 2022-03-08 PROCEDURE — 3074F SYST BP LT 130 MM HG: CPT | Mod: CPTII,S$GLB,, | Performed by: FAMILY MEDICINE

## 2022-03-08 PROCEDURE — 3079F DIAST BP 80-89 MM HG: CPT | Mod: CPTII,S$GLB,, | Performed by: FAMILY MEDICINE

## 2022-03-08 PROCEDURE — 99214 PR OFFICE/OUTPT VISIT, EST, LEVL IV, 30-39 MIN: ICD-10-PCS | Mod: S$GLB,,, | Performed by: FAMILY MEDICINE

## 2022-03-08 PROCEDURE — 3074F PR MOST RECENT SYSTOLIC BLOOD PRESSURE < 130 MM HG: ICD-10-PCS | Mod: CPTII,S$GLB,, | Performed by: FAMILY MEDICINE

## 2022-03-08 PROCEDURE — 99999 PR PBB SHADOW E&M-EST. PATIENT-LVL IV: CPT | Mod: PBBFAC,,, | Performed by: FAMILY MEDICINE

## 2022-03-08 PROCEDURE — 3008F BODY MASS INDEX DOCD: CPT | Mod: CPTII,S$GLB,, | Performed by: FAMILY MEDICINE

## 2022-03-08 PROCEDURE — 99999 PR PBB SHADOW E&M-EST. PATIENT-LVL IV: ICD-10-PCS | Mod: PBBFAC,,, | Performed by: FAMILY MEDICINE

## 2022-03-08 PROCEDURE — 3008F PR BODY MASS INDEX (BMI) DOCUMENTED: ICD-10-PCS | Mod: CPTII,S$GLB,, | Performed by: FAMILY MEDICINE

## 2022-03-08 PROCEDURE — 1160F PR REVIEW ALL MEDS BY PRESCRIBER/CLIN PHARMACIST DOCUMENTED: ICD-10-PCS | Mod: CPTII,S$GLB,, | Performed by: FAMILY MEDICINE

## 2022-03-08 PROCEDURE — 3079F PR MOST RECENT DIASTOLIC BLOOD PRESSURE 80-89 MM HG: ICD-10-PCS | Mod: CPTII,S$GLB,, | Performed by: FAMILY MEDICINE

## 2022-03-08 PROCEDURE — 1160F RVW MEDS BY RX/DR IN RCRD: CPT | Mod: CPTII,S$GLB,, | Performed by: FAMILY MEDICINE

## 2022-03-08 PROCEDURE — 1159F MED LIST DOCD IN RCRD: CPT | Mod: CPTII,S$GLB,, | Performed by: FAMILY MEDICINE

## 2022-03-08 PROCEDURE — 1159F PR MEDICATION LIST DOCUMENTED IN MEDICAL RECORD: ICD-10-PCS | Mod: CPTII,S$GLB,, | Performed by: FAMILY MEDICINE

## 2022-03-08 PROCEDURE — 99214 OFFICE O/P EST MOD 30 MIN: CPT | Mod: S$GLB,,, | Performed by: FAMILY MEDICINE

## 2022-03-08 RX ORDER — LISDEXAMFETAMINE DIMESYLATE 40 MG/1
40 CAPSULE ORAL EVERY MORNING
Qty: 30 CAPSULE | Refills: 0 | Status: SHIPPED | OUTPATIENT
Start: 2022-03-08 | End: 2022-05-11 | Stop reason: SDUPTHER

## 2022-03-08 RX ORDER — NYSTATIN 100000 U/G
CREAM TOPICAL 2 TIMES DAILY
COMMUNITY
Start: 2021-10-27

## 2022-03-08 RX ORDER — ESCITALOPRAM OXALATE 10 MG/1
10 TABLET ORAL DAILY
Qty: 90 TABLET | Refills: 3 | Status: SHIPPED | OUTPATIENT
Start: 2022-03-08 | End: 2022-05-11

## 2022-03-08 RX ORDER — ESCITALOPRAM OXALATE 10 MG/1
10 TABLET ORAL DAILY
COMMUNITY
Start: 2022-02-04 | End: 2022-03-08 | Stop reason: SDUPTHER

## 2022-03-08 RX ORDER — LISDEXAMFETAMINE DIMESYLATE 40 MG/1
40 CAPSULE ORAL DAILY
Qty: 30 CAPSULE | Refills: 0 | Status: SHIPPED | OUTPATIENT
Start: 2022-05-08 | End: 2022-06-20 | Stop reason: SDUPTHER

## 2022-03-08 RX ORDER — ESCITALOPRAM OXALATE 10 MG/1
TABLET ORAL
COMMUNITY
Start: 2021-11-12 | End: 2022-05-11 | Stop reason: SDUPTHER

## 2022-03-08 RX ORDER — TRETINOIN 0.5 MG/G
CREAM TOPICAL
COMMUNITY
Start: 2021-12-17 | End: 2022-11-14 | Stop reason: SDUPTHER

## 2022-03-08 RX ORDER — ZOLPIDEM TARTRATE 10 MG/1
TABLET ORAL
COMMUNITY
End: 2022-04-22

## 2022-03-08 RX ORDER — LISDEXAMFETAMINE DIMESYLATE 40 MG/1
40 CAPSULE ORAL EVERY MORNING
COMMUNITY
Start: 2022-02-04 | End: 2022-03-08 | Stop reason: SDUPTHER

## 2022-03-08 RX ORDER — ZOLPIDEM TARTRATE 5 MG/1
5 TABLET ORAL NIGHTLY PRN
COMMUNITY
Start: 2022-01-04 | End: 2022-04-22

## 2022-03-08 RX ORDER — LISDEXAMFETAMINE DIMESYLATE 40 MG/1
40 CAPSULE ORAL DAILY
Qty: 30 CAPSULE | Refills: 0 | Status: SHIPPED | OUTPATIENT
Start: 2022-04-08 | End: 2022-05-11 | Stop reason: SDUPTHER

## 2022-03-08 NOTE — PROGRESS NOTES
Assessment & Plan  Encounter for medical examination to establish care    Wellness examination  -     CBC Auto Differential; Future; Expected date: 03/08/2022  -     Comprehensive Metabolic Panel; Future; Expected date: 03/08/2022  -     Hemoglobin A1C; Future; Expected date: 03/08/2022  -     Lipid Panel; Future; Expected date: 03/08/2022    Routine fasting labs to be scheduled.     Attention deficit hyperactivity disorder (ADHD), predominantly inattentive type  -     VYVANSE 40 mg Cap; Take 1 capsule (40 mg total) by mouth every morning.  Dispense: 30 capsule; Refill: 0    Controlled. Medication(s) refilled for 3 months.  reviewed.     Adjustment disorder with anxious mood  -     EScitalopram oxalate (LEXAPRO) 10 MG tablet; Take 1 tablet (10 mg total) by mouth once daily.  Dispense: 90 tablet; Refill: 3    Patient would like to continue Lexapro for now and follow up in 2-3 months to discuss transitioning to an alternative antidepressant. Lexapro refilled.       Health maintenance reviewed:  -She follows up with an OB/GYN in [place] for women's health screenings    Follow-up: Follow up in about 2 months (around 5/8/2022).  ______________________________________________________________________    Chief Complaint  Chief Complaint   Patient presents with    Annual Exam     Rx refills as well       HPI  Melinda Mccann is a 26 y.o. female with medical diagnoses as listed in the medical history and problem list that presents to the office to establish care and for an annual exam. 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 She is hoping to match into a Psychiatry residency in [place]. She takes Lexapro for anxiety that has worsened during interview season. Her anxiety has improved with this medication but she has intermittent diarrhea and constipation, which seem to be getting worse, and sexual dysfunction. She is interested in switching to an alternative medication after the  match. She takes Vyvanse 40 mg for ADHD diagnosed at age 21 years. ADHD is well controlled on Vyvanse. Requesting refills of Vyvanse and Lexapro.       Health Maintenance       Date Due Completion Date    Pap Smear 05/09/2020 5/9/2017    TETANUS VACCINE 05/15/2028 5/15/2018            PAST MEDICAL HISTORY:  Past Medical History:   Diagnosis Date    Accessory navicular bone of foot     right    ADD (attention deficit disorder)     Cancer     Dysplastic nevus     Still's murmur        PAST SURGICAL HISTORY:  Past Surgical History:   Procedure Laterality Date    COLONOSCOPY N/A 5/17/2016    Procedure: COLONOSCOPY;  Surgeon: ROSY Ferguson MD;  Location: [address]);  Service: Endoscopy;  Laterality: N/A;    WISDOM TOOTH EXTRACTION         SOCIAL HISTORY:  Social History     Socioeconomic History    Marital status: Single   Occupational History    Occupation: [employer]   Tobacco Use    Smoking status: Never Smoker    Smokeless tobacco: Never Used   Substance and Sexual Activity    Alcohol use: Yes     Alcohol/week: 0.0 standard drinks    Drug use: No    Sexual activity: Yes     Partners: Male     Birth control/protection: Condom, OCP   Other Topics Concern    Are you pregnant or think you may be? No    Breast-feeding No       FAMILY HISTORY:  Family History   Problem Relation Age of Onset    Mitral valve prolapse Mother     Hyperlipidemia Mother     Osteoporosis Mother     Stroke Maternal Aunt         maternal great aunt    Breast cancer Neg Hx     Colon cancer Neg Hx     Ovarian cancer Neg Hx     Diabetes Neg Hx     Hypertension Neg Hx     Melanoma Neg Hx        ALLERGIES AND MEDICATIONS: updated and reviewed.  Review of patient's allergies indicates:  No Known Allergies  Current Outpatient Medications   Medication Sig Dispense Refill    dapsone (ACZONE) 5 % topical gel Use hs on face 60 g 3    EScitalopram oxalate (LEXAPRO) 10 MG tablet       multivitamin (THERAGRAN) per  tablet Take 1 tablet by mouth once daily.      nystatin (MYCOSTATIN) cream Apply topically 2 (two) times daily.      tretinoin (RETIN-A) 0.025 % cream Apply topically nightly. 45 g 0    tretinoin (RETIN-A) 0.05 % cream SMARTSI Topical Every Night      zolpidem (AMBIEN) 10 mg Tab       zolpidem (AMBIEN) 5 MG Tab Take 5 mg by mouth nightly as needed.      EScitalopram oxalate (LEXAPRO) 10 MG tablet Take 1 tablet (10 mg total) by mouth once daily. 90 tablet 3    JUNEL FE 24 1 mg-20 mcg (24)/75 mg (4) per tablet TAKE 1 TABLET BY MOUTH EVERY DAY (Patient not taking: Reported on 3/8/2022) 28 tablet 6    levonorgestrel (KYLEENA) 17.5 mcg/24 hrs (5 yrs) 19.5 mg IUD 1 Intra Uterine Device (17.5 mcg total) by Intrauterine route once. for 1 dose (Patient not taking: Reported on 3/8/2022) 1 Intra Uterine Device 0    [START ON 2022] lisdexamfetamine (VYVANSE) 40 MG Cap Take 1 capsule (40 mg total) by mouth once daily. 30 capsule 0    [START ON 2022] lisdexamfetamine (VYVANSE) 40 MG Cap Take 1 capsule (40 mg total) by mouth once daily. 30 capsule 0    miSOPROStol (CYTOTEC) 200 MCG Tab Take 1 tablet (200 mcg total) by mouth once daily. for 2 days (Patient not taking: Reported on 3/8/2022) 2 tablet 0    VYVANSE 40 mg Cap Take 1 capsule (40 mg total) by mouth every morning. 30 capsule 0     Current Facility-Administered Medications   Medication Dose Route Frequency Provider Last Rate Last Admin    levonorgestrel 17.5 mcg/24 hrs (5 yrs) 19.5 mg IUD 17.5 mcg  17.5 mcg Intrauterine  Marleni Armas DO   17.5 mcg at 19 1358         ROS  Review of Systems   Constitutional: Negative for activity change, appetite change and unexpected weight change.   Gastrointestinal: Positive for constipation and diarrhea.   Psychiatric/Behavioral: Positive for decreased concentration and dysphoric mood. Negative for sleep disturbance. The patient is nervous/anxious.            Physical Exam  Vitals:    22 0834  "  BP: 118/80   BP Location: Left arm   Patient Position: Sitting   BP Method: Medium (Manual)   Pulse: 88   Temp: 98.3 °F (36.8 °C)   TempSrc: Oral   SpO2: 99%   Weight: 60.6 kg (133 lb 11.3 oz)   Height: 5' 7" (1.702 m)    Body mass index is 20.94 kg/m².  Weight: 60.6 kg (133 lb 11.3 oz)   Height: 5' 7" (170.2 cm)   Physical Exam  Constitutional:       General: She is not in acute distress.     Appearance: Normal appearance.   HENT:      Head: Normocephalic and atraumatic.   Neck:      Thyroid: No thyromegaly.   Cardiovascular:      Rate and Rhythm: Normal rate and regular rhythm.      Pulses: Normal pulses.      Heart sounds: Normal heart sounds.   Pulmonary:      Effort: Pulmonary effort is normal. No respiratory distress.      Breath sounds: Normal breath sounds.   Musculoskeletal:      Cervical back: Neck supple.      Right lower leg: No edema.      Left lower leg: No edema.   Lymphadenopathy:      Cervical: No cervical adenopathy.   Skin:     General: Skin is warm and dry.      Findings: No rash.   Neurological:      General: No focal deficit present.      Mental Status: She is alert and oriented to person, place, and time.   Psychiatric:         Mood and Affect: Mood normal.         Behavior: Behavior normal.         Thought Content: Thought content normal.             "

## 2022-03-09 ENCOUNTER — LAB VISIT (OUTPATIENT)
Dept: LAB | Facility: HOSPITAL | Age: 27
End: 2022-03-09
Attending: FAMILY MEDICINE
Payer: COMMERCIAL

## 2022-03-09 DIAGNOSIS — Z00.00 WELLNESS EXAMINATION: ICD-10-CM

## 2022-03-09 LAB
ALBUMIN SERPL BCP-MCNC: 4.4 G/DL (ref 3.5–5.2)
ALP SERPL-CCNC: 64 U/L (ref 55–135)
ALT SERPL W/O P-5'-P-CCNC: 12 U/L (ref 10–44)
ANION GAP SERPL CALC-SCNC: 11 MMOL/L (ref 8–16)
AST SERPL-CCNC: 16 U/L (ref 10–40)
BASOPHILS # BLD AUTO: 0.03 K/UL (ref 0–0.2)
BASOPHILS NFR BLD: 0.6 % (ref 0–1.9)
BILIRUB SERPL-MCNC: 3.2 MG/DL (ref 0.1–1)
BUN SERPL-MCNC: 10 MG/DL (ref 6–20)
CALCIUM SERPL-MCNC: 9.6 MG/DL (ref 8.7–10.5)
CHLORIDE SERPL-SCNC: 101 MMOL/L (ref 95–110)
CHOLEST SERPL-MCNC: 181 MG/DL (ref 120–199)
CHOLEST/HDLC SERPL: 2.5 {RATIO} (ref 2–5)
CO2 SERPL-SCNC: 26 MMOL/L (ref 23–29)
CREAT SERPL-MCNC: 0.7 MG/DL (ref 0.5–1.4)
DIFFERENTIAL METHOD: ABNORMAL
EOSINOPHIL # BLD AUTO: 0.1 K/UL (ref 0–0.5)
EOSINOPHIL NFR BLD: 1.7 % (ref 0–8)
ERYTHROCYTE [DISTWIDTH] IN BLOOD BY AUTOMATED COUNT: 11.8 % (ref 11.5–14.5)
EST. GFR  (AFRICAN AMERICAN): >60 ML/MIN/1.73 M^2
EST. GFR  (NON AFRICAN AMERICAN): >60 ML/MIN/1.73 M^2
ESTIMATED AVG GLUCOSE: 97 MG/DL (ref 68–131)
GLUCOSE SERPL-MCNC: 92 MG/DL (ref 70–110)
HBA1C MFR BLD: 5 % (ref 4–5.6)
HCT VFR BLD AUTO: 43.2 % (ref 37–48.5)
HDLC SERPL-MCNC: 71 MG/DL (ref 40–75)
HDLC SERPL: 39.2 % (ref 20–50)
HGB BLD-MCNC: 14.7 G/DL (ref 12–16)
IMM GRANULOCYTES # BLD AUTO: 0.01 K/UL (ref 0–0.04)
IMM GRANULOCYTES NFR BLD AUTO: 0.2 % (ref 0–0.5)
LDLC SERPL CALC-MCNC: 94 MG/DL (ref 63–159)
LYMPHOCYTES # BLD AUTO: 1.6 K/UL (ref 1–4.8)
LYMPHOCYTES NFR BLD: 29.7 % (ref 18–48)
MCH RBC QN AUTO: 31.8 PG (ref 27–31)
MCHC RBC AUTO-ENTMCNC: 34 G/DL (ref 32–36)
MCV RBC AUTO: 94 FL (ref 82–98)
MONOCYTES # BLD AUTO: 0.4 K/UL (ref 0.3–1)
MONOCYTES NFR BLD: 7.7 % (ref 4–15)
NEUTROPHILS # BLD AUTO: 3.3 K/UL (ref 1.8–7.7)
NEUTROPHILS NFR BLD: 60.1 % (ref 38–73)
NONHDLC SERPL-MCNC: 110 MG/DL
NRBC BLD-RTO: 0 /100 WBC
PLATELET # BLD AUTO: 185 K/UL (ref 150–450)
PMV BLD AUTO: 11.5 FL (ref 9.2–12.9)
POTASSIUM SERPL-SCNC: 3.8 MMOL/L (ref 3.5–5.1)
PROT SERPL-MCNC: 7 G/DL (ref 6–8.4)
RBC # BLD AUTO: 4.62 M/UL (ref 4–5.4)
SODIUM SERPL-SCNC: 138 MMOL/L (ref 136–145)
TRIGL SERPL-MCNC: 80 MG/DL (ref 30–150)
WBC # BLD AUTO: 5.43 K/UL (ref 3.9–12.7)

## 2022-03-09 PROCEDURE — 83036 HEMOGLOBIN GLYCOSYLATED A1C: CPT | Performed by: FAMILY MEDICINE

## 2022-03-09 PROCEDURE — 80053 COMPREHEN METABOLIC PANEL: CPT | Performed by: FAMILY MEDICINE

## 2022-03-09 PROCEDURE — 36415 COLL VENOUS BLD VENIPUNCTURE: CPT | Mod: PO | Performed by: FAMILY MEDICINE

## 2022-03-09 PROCEDURE — 80061 LIPID PANEL: CPT | Performed by: FAMILY MEDICINE

## 2022-03-09 PROCEDURE — 85025 COMPLETE CBC W/AUTO DIFF WBC: CPT | Performed by: FAMILY MEDICINE

## 2022-03-14 ENCOUNTER — PATIENT MESSAGE (OUTPATIENT)
Dept: FAMILY MEDICINE | Facility: CLINIC | Age: 27
End: 2022-03-14
Payer: COMMERCIAL

## 2022-03-14 DIAGNOSIS — E80.6 HYPERBILIRUBINEMIA: Primary | ICD-10-CM

## 2022-03-24 ENCOUNTER — OFFICE VISIT (OUTPATIENT)
Dept: DERMATOLOGY | Facility: CLINIC | Age: 27
End: 2022-03-24
Payer: COMMERCIAL

## 2022-03-24 DIAGNOSIS — L90.5 SCAR: ICD-10-CM

## 2022-03-24 DIAGNOSIS — D22.9 ATYPICAL MOLE: Primary | ICD-10-CM

## 2022-03-24 DIAGNOSIS — Z12.83 SKIN CANCER SCREENING: ICD-10-CM

## 2022-03-24 DIAGNOSIS — D22.9 MULTIPLE BENIGN NEVI: ICD-10-CM

## 2022-03-24 PROCEDURE — 99203 OFFICE O/P NEW LOW 30 MIN: CPT | Mod: S$GLB,,, | Performed by: DERMATOLOGY

## 2022-03-24 PROCEDURE — 99203 PR OFFICE/OUTPT VISIT, NEW, LEVL III, 30-44 MIN: ICD-10-PCS | Mod: S$GLB,,, | Performed by: DERMATOLOGY

## 2022-03-24 PROCEDURE — 1159F MED LIST DOCD IN RCRD: CPT | Mod: CPTII,S$GLB,, | Performed by: DERMATOLOGY

## 2022-03-24 PROCEDURE — 99999 PR PBB SHADOW E&M-EST. PATIENT-LVL III: ICD-10-PCS | Mod: PBBFAC,,, | Performed by: DERMATOLOGY

## 2022-03-24 PROCEDURE — 99999 PR PBB SHADOW E&M-EST. PATIENT-LVL III: CPT | Mod: PBBFAC,,, | Performed by: DERMATOLOGY

## 2022-03-24 PROCEDURE — 1159F PR MEDICATION LIST DOCUMENTED IN MEDICAL RECORD: ICD-10-PCS | Mod: CPTII,S$GLB,, | Performed by: DERMATOLOGY

## 2022-03-24 PROCEDURE — 3044F HG A1C LEVEL LT 7.0%: CPT | Mod: CPTII,S$GLB,, | Performed by: DERMATOLOGY

## 2022-03-24 PROCEDURE — 3044F PR MOST RECENT HEMOGLOBIN A1C LEVEL <7.0%: ICD-10-PCS | Mod: CPTII,S$GLB,, | Performed by: DERMATOLOGY

## 2022-03-24 NOTE — PROGRESS NOTES
Subjective:       Patient ID:  Melinda Mccann is a 26 y.o. female who presents for   Chief Complaint   Patient presents with    Skin Check     tbse     Had mole removal on the chest in [place].        Review of Systems   Constitutional: Negative for fever and chills.   HENT: Negative for sore throat.    Respiratory: Negative for cough.         Objective:    Physical Exam   Constitutional: She appears well-developed and well-nourished. No distress.   Eyes: No conjunctival no injection.   Cardiovascular: There is no local extremity swelling and no dependent edema.     Neurological: She is alert and oriented to person, place, and time. She is not disoriented.   Psychiatric: She has a normal mood and affect.   Skin:   Areas Examined (abnormalities noted in diagram):   Scalp / Hair Palpated and Inspected  Head / Face Inspection Performed  Neck Inspection Performed  Chest / Axilla Inspection Performed  Abdomen Inspection Performed  Genitals / Buttocks / Groin Inspection Performed  Back Inspection Performed  RUE Inspected  LUE Inspection Performed  RLE Inspected  LLE Inspection Performed  Nails and Digits Inspection Performed                   Diagram Legend     Erythematous scaling macule/papule c/w actinic keratosis       Vascular papule c/w angioma      Pigmented verrucoid papule/plaque c/w seborrheic keratosis      Yellow umbilicated papule c/w sebaceous hyperplasia      Irregularly shaped tan macule c/w lentigo     1-2 mm smooth white papules consistent with Milia      Movable subcutaneous cyst with punctum c/w epidermal inclusion cyst      Subcutaneous movable cyst c/w pilar cyst      Firm pink to brown papule c/w dermatofibroma      Pedunculated fleshy papule(s) c/w skin tag(s)      Evenly pigmented macule c/w junctional nevus     Mildly variegated pigmented, slightly irregular-bordered macule c/w mildly atypical nevus      Flesh colored to evenly pigmented papule c/w intradermal nevus       Pink pearly  papule/plaque c/w basal cell carcinoma      Erythematous hyperkeratotic cursted plaque c/w SCC      Surgical scar with no sign of skin cancer recurrence      Open and closed comedones      Inflammatory papules and pustules      Verrucoid papule consistent consistent with wart     Erythematous eczematous patches and plaques     Dystrophic onycholytic nail with subungual debris c/w onychomycosis     Umbilicated papule    Erythematous-base heme-crusted tan verrucoid plaque consistent with inflamed seborrheic keratosis     Erythematous Silvery Scaling Plaque c/w Psoriasis     See annotation      Assessment / Plan:        Atypical mole  +mod atyp mole sp exc 2016.    Scar  No signs of recurrence are noted in previous surgical areas or scars.  Discussed getting copies of previous biopsy results and or old records.    Multiple benign nevi  Discussed all of the following with the patient.    Patient to check their breasts (if applicable), buttocks, and groin with a mirror.  Patient deferred our examination of these areas today.    Each month, check your body for any spots such as freckles, age spots, and moles.  Watch for color changes and shape changes and growth in size.    Have your shrestha or  pay attention to any dark color changes to moles of the scalp.    Moles, also called nevi, are small, colored (pigmented) marks on the skin. They have no known purpose. Many moles appear before age 30, but they also increase frequently as people age. Moles most often are not cancer (benign) and are harmless. But some become cancerous (malignant). Thats why you need to watch the moles on your body and tell your healthcare provider about any that concern you.    Skin cancer screening  No other seriously suspicious lesions noted for body areas examined today.  Patient to inform of us if they notice any dark or changing or suspicious spots in areas not examined today.  Follow up for routine monitoring recommended to  patient.    Instructed patient to watch out for dark spots, bleeding spots, crusty spots, sores that break out repeatedly in the same spot.  These characteristics are risk factors for skin cancer, and patient is to notify us if they experience any of these symptoms.    Recurrent mole  Sp bx of the back in [place].  Discussed getting copies of previous biopsy results and or old records.  Patient and or guardian to monitor this area/lesion or these areas/lesions for changes or worsening.  Patient and or guardian to contact us if any changes are noted for such.  Pt reports was told benign in [place].             Follow up in about 1 year (around 3/24/2023).

## 2022-03-24 NOTE — PATIENT INSTRUCTIONS
Recommend sun block with plain zinc oxide or sun block products with some zinc or titanium as their base with an spf of at least 30 to be applied every 2-3 hours of outdoor exposure.       Discussed with patient the need to wear hats and keep covered from the sun, even on partly gio days.  Reviewed how ongoing sun exposure propagates actinic keratoses.

## 2022-04-22 ENCOUNTER — NURSE TRIAGE (OUTPATIENT)
Dept: ADMINISTRATIVE | Facility: CLINIC | Age: 27
End: 2022-04-22
Payer: MEDICAID

## 2022-04-22 ENCOUNTER — HOSPITAL ENCOUNTER (EMERGENCY)
Facility: HOSPITAL | Age: 27
Discharge: HOME | End: 2022-04-22
Attending: EMERGENCY MEDICINE
Payer: COMMERCIAL

## 2022-04-22 ENCOUNTER — TELEPHONE (OUTPATIENT)
Dept: FAMILY MEDICINE | Facility: CLINIC | Age: 27
End: 2022-04-22
Payer: MEDICAID

## 2022-04-22 VITALS
WEIGHT: 130 LBS | DIASTOLIC BLOOD PRESSURE: 57 MMHG | SYSTOLIC BLOOD PRESSURE: 95 MMHG | HEIGHT: 67 IN | TEMPERATURE: 98 F | BODY MASS INDEX: 20.4 KG/M2 | RESPIRATION RATE: 18 BRPM | HEART RATE: 99 BPM | OXYGEN SATURATION: 99 %

## 2022-04-22 DIAGNOSIS — R19.7 NAUSEA VOMITING AND DIARRHEA: Primary | ICD-10-CM

## 2022-04-22 DIAGNOSIS — R11.2 NAUSEA VOMITING AND DIARRHEA: Primary | ICD-10-CM

## 2022-04-22 LAB
ALBUMIN SERPL BCP-MCNC: 4 G/DL (ref 3.5–5.2)
ALP SERPL-CCNC: 53 U/L (ref 55–135)
ALT SERPL W/O P-5'-P-CCNC: 12 U/L (ref 10–44)
ANION GAP SERPL CALC-SCNC: 11 MMOL/L (ref 8–16)
AST SERPL-CCNC: 15 U/L (ref 10–40)
B-HCG UR QL: NEGATIVE
BASOPHILS # BLD AUTO: 0.04 K/UL (ref 0–0.2)
BASOPHILS NFR BLD: 0.3 % (ref 0–1.9)
BILIRUB SERPL-MCNC: 3.3 MG/DL (ref 0.1–1)
BUN SERPL-MCNC: 19 MG/DL (ref 6–20)
CALCIUM SERPL-MCNC: 8.6 MG/DL (ref 8.7–10.5)
CHLORIDE SERPL-SCNC: 105 MMOL/L (ref 95–110)
CO2 SERPL-SCNC: 24 MMOL/L (ref 23–29)
CREAT SERPL-MCNC: 0.7 MG/DL (ref 0.5–1.4)
CTP QC/QA: YES
DIFFERENTIAL METHOD: ABNORMAL
EOSINOPHIL # BLD AUTO: 0.1 K/UL (ref 0–0.5)
EOSINOPHIL NFR BLD: 0.4 % (ref 0–8)
ERYTHROCYTE [DISTWIDTH] IN BLOOD BY AUTOMATED COUNT: 11.3 % (ref 11.5–14.5)
EST. GFR  (AFRICAN AMERICAN): >60 ML/MIN/1.73 M^2
EST. GFR  (NON AFRICAN AMERICAN): >60 ML/MIN/1.73 M^2
GLUCOSE SERPL-MCNC: 132 MG/DL (ref 70–110)
HCT VFR BLD AUTO: 41.9 % (ref 37–48.5)
HGB BLD-MCNC: 14.3 G/DL (ref 12–16)
IMM GRANULOCYTES # BLD AUTO: 0.07 K/UL (ref 0–0.04)
IMM GRANULOCYTES NFR BLD AUTO: 0.5 % (ref 0–0.5)
LIPASE SERPL-CCNC: 17 U/L (ref 4–60)
LYMPHOCYTES # BLD AUTO: 0.2 K/UL (ref 1–4.8)
LYMPHOCYTES NFR BLD: 1.2 % (ref 18–48)
MAGNESIUM SERPL-MCNC: 1.8 MG/DL (ref 1.6–2.6)
MCH RBC QN AUTO: 31 PG (ref 27–31)
MCHC RBC AUTO-ENTMCNC: 34.1 G/DL (ref 32–36)
MCV RBC AUTO: 91 FL (ref 82–98)
MONOCYTES # BLD AUTO: 0.5 K/UL (ref 0.3–1)
MONOCYTES NFR BLD: 3.7 % (ref 4–15)
NEUTROPHILS # BLD AUTO: 12.2 K/UL (ref 1.8–7.7)
NEUTROPHILS NFR BLD: 93.9 % (ref 38–73)
NRBC BLD-RTO: 0 /100 WBC
PLATELET # BLD AUTO: 158 K/UL (ref 150–450)
PMV BLD AUTO: 11.1 FL (ref 9.2–12.9)
POTASSIUM SERPL-SCNC: 4 MMOL/L (ref 3.5–5.1)
PROT SERPL-MCNC: 6.7 G/DL (ref 6–8.4)
RBC # BLD AUTO: 4.61 M/UL (ref 4–5.4)
SODIUM SERPL-SCNC: 140 MMOL/L (ref 136–145)
WBC # BLD AUTO: 12.97 K/UL (ref 3.9–12.7)

## 2022-04-22 PROCEDURE — 96361 HYDRATE IV INFUSION ADD-ON: CPT

## 2022-04-22 PROCEDURE — 85025 COMPLETE CBC W/AUTO DIFF WBC: CPT | Performed by: PHYSICIAN ASSISTANT

## 2022-04-22 PROCEDURE — 99284 EMERGENCY DEPT VISIT MOD MDM: CPT | Mod: 25

## 2022-04-22 PROCEDURE — 25000003 PHARM REV CODE 250: Performed by: PHYSICIAN ASSISTANT

## 2022-04-22 PROCEDURE — 81025 URINE PREGNANCY TEST: CPT | Performed by: PHYSICIAN ASSISTANT

## 2022-04-22 PROCEDURE — 83690 ASSAY OF LIPASE: CPT | Performed by: PHYSICIAN ASSISTANT

## 2022-04-22 PROCEDURE — 80053 COMPREHEN METABOLIC PANEL: CPT | Performed by: PHYSICIAN ASSISTANT

## 2022-04-22 PROCEDURE — 96375 TX/PRO/DX INJ NEW DRUG ADDON: CPT

## 2022-04-22 PROCEDURE — 96374 THER/PROPH/DIAG INJ IV PUSH: CPT

## 2022-04-22 PROCEDURE — 63600175 PHARM REV CODE 636 W HCPCS: Performed by: PHYSICIAN ASSISTANT

## 2022-04-22 PROCEDURE — 83735 ASSAY OF MAGNESIUM: CPT | Performed by: PHYSICIAN ASSISTANT

## 2022-04-22 RX ORDER — FAMOTIDINE 10 MG/ML
20 INJECTION INTRAVENOUS
Status: COMPLETED | OUTPATIENT
Start: 2022-04-22 | End: 2022-04-22

## 2022-04-22 RX ORDER — ONDANSETRON 4 MG/1
8 TABLET, FILM COATED ORAL EVERY 6 HOURS PRN
Qty: 30 TABLET | Refills: 0 | Status: SHIPPED | OUTPATIENT
Start: 2022-04-22 | End: 2022-05-11

## 2022-04-22 RX ORDER — DICYCLOMINE HYDROCHLORIDE 20 MG/1
20 TABLET ORAL 4 TIMES DAILY
Qty: 12 TABLET | Refills: 0 | Status: SHIPPED | OUTPATIENT
Start: 2022-04-22 | End: 2022-04-25

## 2022-04-22 RX ORDER — PROMETHAZINE HYDROCHLORIDE 25 MG/1
25 SUPPOSITORY RECTAL EVERY 6 HOURS PRN
Qty: 5 SUPPOSITORY | Refills: 0 | Status: SHIPPED | OUTPATIENT
Start: 2022-04-22 | End: 2022-05-11

## 2022-04-22 RX ORDER — ONDANSETRON 4 MG/1
4 TABLET, ORALLY DISINTEGRATING ORAL
Status: COMPLETED | OUTPATIENT
Start: 2022-04-22 | End: 2022-04-22

## 2022-04-22 RX ORDER — PROMETHAZINE HYDROCHLORIDE 25 MG/1
25 TABLET ORAL EVERY 6 HOURS PRN
Qty: 15 TABLET | Refills: 0 | Status: SHIPPED | OUTPATIENT
Start: 2022-04-22 | End: 2022-05-11

## 2022-04-22 RX ORDER — ONDANSETRON 2 MG/ML
4 INJECTION INTRAMUSCULAR; INTRAVENOUS
Status: COMPLETED | OUTPATIENT
Start: 2022-04-22 | End: 2022-04-22

## 2022-04-22 RX ADMIN — ONDANSETRON 4 MG: 2 INJECTION INTRAMUSCULAR; INTRAVENOUS at 05:04

## 2022-04-22 RX ADMIN — SODIUM CHLORIDE 1000 ML: 0.9 INJECTION, SOLUTION INTRAVENOUS at 05:04

## 2022-04-22 RX ADMIN — ONDANSETRON 4 MG: 4 TABLET, ORALLY DISINTEGRATING ORAL at 04:04

## 2022-04-22 RX ADMIN — FAMOTIDINE 20 MG: 10 INJECTION INTRAVENOUS at 05:04

## 2022-04-22 NOTE — ED NOTES
Unable to discontinue urinalysis. Lab called and notified to cancel urinalysis per providers request.

## 2022-04-22 NOTE — TELEPHONE ENCOUNTER
"Call was placed to pt who was exiting [facility] at the time & stable. Presented to ER with unstable vitals, diaherra & excessive vomiting. Pt states the diagnosis was stomach bacteria, "bug". She was prescribed Zofran Phenergan & Bentyl. Pt was informed & assured that the message was sent to PCP & a response would be forthcoming. Continuing to monitor.     "

## 2022-04-22 NOTE — TELEPHONE ENCOUNTER
"Caller states pt have vomited 6 times per hour for the last 7 hours.  Pt advised per protocol and verbalized understanding.    Reason for Disposition   [1] SEVERE vomiting (e.g., 6 or more times/day) AND [2] present > 8 hours    Additional Information   Negative: Shock suspected (e.g., cold/pale/clammy skin, too weak to stand, low BP, rapid pulse)   Negative: Difficult to awaken or acting confused  (e.g., disoriented, slurred speech)   Negative: Sounds like a life-threatening emergency to the triager   Negative: [1] Vomiting AND [2] contains red blood or black ("coffee ground") material  (Exception: few red streaks in vomit that only happened once)   Negative: Severe pain in one eye   Negative: Recent head injury (within last 3 days)   Negative: Recent abdominal injury (within last 3 days)   Negative: [1] Insulin-dependent diabetes (Type I) AND [2] glucose > 400 mg/dl (22 mmol/l)    Protocols used: ST VOMITING-A-AH      "

## 2022-04-22 NOTE — ED PROVIDER NOTES
Encounter Date: 4/22/2022       History     Chief Complaint   Patient presents with    Emesis     Here with c/o nausea/vomiting starting at 2030 last night      28yo F with chief complaint acute onset nonbloody n/v which began around 8:30pm yesterday evening (4/21).     Patient denies history of similar symptoms.  Denies abdominal pain.  No history of GERD or gastritis.  Denies gallbladder issues.  No urinary complaints.  No flank pain.  No vaginal complaints.  No fever.  No cough.  No recent illness.  Symptoms are acute, constant, mild.  No alleviating or exacerbating factors.  No radiation symptoms.  No headache.  No neck pain or stiffness.  Normal BM yesterday a.m..  Denies frequent constipation.    Past medical history:  ADHD  Melanoma  Anxiety/depression    IUD in place        Review of patient's allergies indicates:  No Known Allergies  Past Medical History:   Diagnosis Date    Accessory navicular bone of foot     right    ADD (attention deficit disorder)     Cancer     Dysplastic nevus     Melanoma     Still's murmur      Past Surgical History:   Procedure Laterality Date    COLONOSCOPY N/A 5/17/2016    Procedure: COLONOSCOPY;  Surgeon: ROSY Ferguson MD;  Location: [address];  Service: Endoscopy;  Laterality: N/A;    WISDOM TOOTH EXTRACTION       Family History   Problem Relation Age of Onset    Mitral valve prolapse Mother     Hyperlipidemia Mother     Osteoporosis Mother     Stroke Maternal Aunt         maternal great aunt    Breast cancer Neg Hx     Colon cancer Neg Hx     Ovarian cancer Neg Hx     Diabetes Neg Hx     Hypertension Neg Hx     Melanoma Neg Hx      Social History     Tobacco Use    Smoking status: Never Smoker    Smokeless tobacco: Never Used   Substance Use Topics    Alcohol use: Yes     Alcohol/week: 0.0 standard drinks    Drug use: No     Review of Systems   Constitutional: Negative for appetite change, chills and fever.   Respiratory: Negative for cough  and shortness of breath.    Cardiovascular: Negative for chest pain.   Gastrointestinal: Positive for nausea and vomiting. Negative for abdominal pain, constipation and diarrhea.   Genitourinary: Negative for dysuria, flank pain, pelvic pain, vaginal bleeding, vaginal discharge and vaginal pain.   Musculoskeletal: Negative for back pain, myalgias, neck pain and neck stiffness.   Neurological: Negative for syncope.       Physical Exam     Initial Vitals [04/22/22 0357]   BP Pulse Resp Temp SpO2   114/75 104 18 98.2 °F (36.8 °C) 99 %      MAP       --         Physical Exam    Nursing note and vitals reviewed.  Constitutional: She appears well-developed and well-nourished. She is not diaphoretic. No distress.   HENT:   Head: Normocephalic and atraumatic.   Neck:   Normal range of motion.  Cardiovascular: Intact distal pulses.   Pulmonary/Chest: No respiratory distress.   Abdominal: Abdomen is soft. There is no abdominal tenderness.   Musculoskeletal:      Cervical back: Normal range of motion.     Neurological: She is alert and oriented to person, place, and time. GCS score is 15. GCS eye subscore is 4. GCS verbal subscore is 5. GCS motor subscore is 6.   Skin: Skin is warm. Capillary refill takes less than 2 seconds.   Psychiatric: She has a normal mood and affect. Thought content normal.         ED Course   Procedures  Labs Reviewed   CBC W/ AUTO DIFFERENTIAL - Abnormal; Notable for the following components:       Result Value    WBC 12.97 (*)     RDW 11.3 (*)     Gran # (ANC) 12.2 (*)     Immature Grans (Abs) 0.07 (*)     Lymph # 0.2 (*)     Gran % 93.9 (*)     Lymph % 1.2 (*)     Mono % 3.7 (*)     All other components within normal limits   COMPREHENSIVE METABOLIC PANEL - Abnormal; Notable for the following components:    Glucose 132 (*)     Calcium 8.6 (*)     Total Bilirubin 3.3 (*)     Alkaline Phosphatase 53 (*)     All other components within normal limits   LIPASE   MAGNESIUM   URINALYSIS, REFLEX TO URINE  CULTURE   POCT URINE PREGNANCY          Imaging Results    None          Medications   ondansetron disintegrating tablet 4 mg (4 mg Oral Given 4/22/22 0403)   ondansetron injection 4 mg (4 mg Intravenous Given 4/22/22 0552)   famotidine (PF) injection 20 mg (20 mg Intravenous Given 4/22/22 0552)   sodium chloride 0.9% bolus 1,000 mL (0 mLs Intravenous Stopped 4/22/22 0717)     Medical Decision Making:   History:   Old Medical Records: I decided to obtain old medical records.  Differential Diagnosis:   Viral gastroenteritis, GERD, small-bowel obstruction  Clinical Tests:   Lab Tests: Ordered  ED Management:  7:34 AM:  This is Chiki Lewis PA-C documenting at shift change.  Nausea improved.  Continues to deny pain.  She is now having diarrhea.  Symptoms overall most suspicious for viral gastritis versus food poisoning.  Workup overall unremarkable.  Specifically no evidence of electrolyte or metabolic disturbance.  Normal renal function.  No fever or leukocytosis.  UPT negative.  No urinary symptoms.  Will offer supportive care.  Tolerating p.o..  Advising follow-up with PCP. Strict return precautions discussed.  Agreeable to plan.             ED Course as of 04/22/22 0734   Fri Apr 22, 2022   0540 Continues with nausea.  No emesis.  Unable to urinate.  Tolerating p.o..  Will get basic labs and IV fluids. [SM]      ED Course User Index  [SM] Liam Mccartney PA-C             Clinical Impression:   Final diagnoses:  [R11.2, R19.7] Nausea vomiting and diarrhea (Primary)          ED Disposition Condition    Discharge Stable        ED Prescriptions     Medication Sig Dispense Start Date End Date Auth. Provider    ondansetron (ZOFRAN) 4 MG tablet Take 2 tablets (8 mg total) by mouth every 6 (six) hours as needed for Nausea. 30 tablet 4/22/2022 5/22/2022 Chiki Lewis PA-C    promethazine (PHENERGAN) 25 MG suppository Place 1 suppository (25 mg total) rectally every 6 (six) hours as needed for Nausea. 5 suppository  4/22/2022  Chiki Lewis PA-C    promethazine (PHENERGAN) 25 MG tablet Take 1 tablet (25 mg total) by mouth every 6 (six) hours as needed for Nausea. 15 tablet 4/22/2022  Chiki Lewis PA-C    dicyclomine (BENTYL) 20 mg tablet Take 1 tablet (20 mg total) by mouth 4 (four) times daily. for 3 days 12 tablet 4/22/2022 4/25/2022 Chiki Lewis PA-C        Follow-up Information     Follow up With Specialties Details Why Contact Info    Deborah Levi,  Family Medicine Schedule an appointment as soon as possible for a visit in 1 day For re-evaluation [address] [facility] [place] [address]  [phone]      [facility] - Emergency Dept Emergency Medicine Go to  If symptoms worsen [address]  [facility] [address] [phone]           Chiki Lewis PA-C  04/22/22 0713

## 2022-04-22 NOTE — TELEPHONE ENCOUNTER
Follow up call placed to pt to advise of PCP notification & ED diagnosis was gastroentiritis & no need for antibiotics or stool testing. Drink water/cranberry juice as the problem should take care of itself in a few days. Also informed pt they could phone & schedule a follow up visit with this clinic if it would make them more comfortable. Verbalized understanding & thank you.

## 2022-04-22 NOTE — DISCHARGE INSTRUCTIONS

## 2022-05-10 ENCOUNTER — LAB VISIT (OUTPATIENT)
Dept: LAB | Facility: HOSPITAL | Age: 27
End: 2022-05-10
Attending: PHYSICIAN ASSISTANT
Payer: MEDICAID

## 2022-05-10 ENCOUNTER — OFFICE VISIT (OUTPATIENT)
Dept: HEPATOLOGY | Facility: CLINIC | Age: 27
End: 2022-05-10
Payer: MEDICAID

## 2022-05-10 VITALS
TEMPERATURE: 98 F | HEART RATE: 72 BPM | BODY MASS INDEX: 20.51 KG/M2 | DIASTOLIC BLOOD PRESSURE: 76 MMHG | SYSTOLIC BLOOD PRESSURE: 100 MMHG | RESPIRATION RATE: 18 BRPM | HEIGHT: 67 IN | WEIGHT: 130.69 LBS

## 2022-05-10 DIAGNOSIS — D18.03 LIVER HEMANGIOMA: ICD-10-CM

## 2022-05-10 DIAGNOSIS — E80.6 HYPERBILIRUBINEMIA: Primary | ICD-10-CM

## 2022-05-10 DIAGNOSIS — E80.6 HYPERBILIRUBINEMIA: ICD-10-CM

## 2022-05-10 LAB
ALBUMIN SERPL BCP-MCNC: 4.8 G/DL (ref 3.5–5.2)
ALP SERPL-CCNC: 66 U/L (ref 55–135)
ALT SERPL W/O P-5'-P-CCNC: 12 U/L (ref 10–44)
AST SERPL-CCNC: 14 U/L (ref 10–40)
BILIRUB DIRECT SERPL-MCNC: 0.7 MG/DL (ref 0.1–0.3)
BILIRUB SERPL-MCNC: 3.8 MG/DL (ref 0.1–1)
PROT SERPL-MCNC: 7.5 G/DL (ref 6–8.4)

## 2022-05-10 PROCEDURE — 3078F PR MOST RECENT DIASTOLIC BLOOD PRESSURE < 80 MM HG: ICD-10-PCS | Mod: CPTII,,, | Performed by: PHYSICIAN ASSISTANT

## 2022-05-10 PROCEDURE — 3044F PR MOST RECENT HEMOGLOBIN A1C LEVEL <7.0%: ICD-10-PCS | Mod: CPTII,,, | Performed by: PHYSICIAN ASSISTANT

## 2022-05-10 PROCEDURE — 86704 HEP B CORE ANTIBODY TOTAL: CPT | Performed by: PHYSICIAN ASSISTANT

## 2022-05-10 PROCEDURE — 81350 UGT1A1 GENE COMMON VARIANTS: CPT | Performed by: PHYSICIAN ASSISTANT

## 2022-05-10 PROCEDURE — 3074F SYST BP LT 130 MM HG: CPT | Mod: CPTII,,, | Performed by: PHYSICIAN ASSISTANT

## 2022-05-10 PROCEDURE — 1159F MED LIST DOCD IN RCRD: CPT | Mod: CPTII,,, | Performed by: PHYSICIAN ASSISTANT

## 2022-05-10 PROCEDURE — 3008F PR BODY MASS INDEX (BMI) DOCUMENTED: ICD-10-PCS | Mod: CPTII,,, | Performed by: PHYSICIAN ASSISTANT

## 2022-05-10 PROCEDURE — 3078F DIAST BP <80 MM HG: CPT | Mod: CPTII,,, | Performed by: PHYSICIAN ASSISTANT

## 2022-05-10 PROCEDURE — 99999 PR PBB SHADOW E&M-EST. PATIENT-LVL V: CPT | Mod: PBBFAC,,, | Performed by: PHYSICIAN ASSISTANT

## 2022-05-10 PROCEDURE — 86803 HEPATITIS C AB TEST: CPT | Performed by: PHYSICIAN ASSISTANT

## 2022-05-10 PROCEDURE — 80076 HEPATIC FUNCTION PANEL: CPT | Performed by: PHYSICIAN ASSISTANT

## 2022-05-10 PROCEDURE — 86706 HEP B SURFACE ANTIBODY: CPT | Performed by: PHYSICIAN ASSISTANT

## 2022-05-10 PROCEDURE — 3008F BODY MASS INDEX DOCD: CPT | Mod: CPTII,,, | Performed by: PHYSICIAN ASSISTANT

## 2022-05-10 PROCEDURE — 99204 PR OFFICE/OUTPT VISIT, NEW, LEVL IV, 45-59 MIN: ICD-10-PCS | Mod: S$PBB,,, | Performed by: PHYSICIAN ASSISTANT

## 2022-05-10 PROCEDURE — 1160F PR REVIEW ALL MEDS BY PRESCRIBER/CLIN PHARMACIST DOCUMENTED: ICD-10-PCS | Mod: CPTII,,, | Performed by: PHYSICIAN ASSISTANT

## 2022-05-10 PROCEDURE — 3044F HG A1C LEVEL LT 7.0%: CPT | Mod: CPTII,,, | Performed by: PHYSICIAN ASSISTANT

## 2022-05-10 PROCEDURE — 99215 OFFICE O/P EST HI 40 MIN: CPT | Mod: PBBFAC | Performed by: PHYSICIAN ASSISTANT

## 2022-05-10 PROCEDURE — 1159F PR MEDICATION LIST DOCUMENTED IN MEDICAL RECORD: ICD-10-PCS | Mod: CPTII,,, | Performed by: PHYSICIAN ASSISTANT

## 2022-05-10 PROCEDURE — 1160F RVW MEDS BY RX/DR IN RCRD: CPT | Mod: CPTII,,, | Performed by: PHYSICIAN ASSISTANT

## 2022-05-10 PROCEDURE — 99999 PR PBB SHADOW E&M-EST. PATIENT-LVL V: ICD-10-PCS | Mod: PBBFAC,,, | Performed by: PHYSICIAN ASSISTANT

## 2022-05-10 PROCEDURE — 3074F PR MOST RECENT SYSTOLIC BLOOD PRESSURE < 130 MM HG: ICD-10-PCS | Mod: CPTII,,, | Performed by: PHYSICIAN ASSISTANT

## 2022-05-10 PROCEDURE — 86790 VIRUS ANTIBODY NOS: CPT | Performed by: PHYSICIAN ASSISTANT

## 2022-05-10 PROCEDURE — 87340 HEPATITIS B SURFACE AG IA: CPT | Performed by: PHYSICIAN ASSISTANT

## 2022-05-10 PROCEDURE — 99204 OFFICE O/P NEW MOD 45 MIN: CPT | Mod: S$PBB,,, | Performed by: PHYSICIAN ASSISTANT

## 2022-05-10 NOTE — PROGRESS NOTES
Hepatology Consultation: [facility]    NEW PATIENT  PCP: Deborah Levi, DO    Subjective      Ms. Mccann is a 27 y.o. female with PMH as listed below who presents to clinic for elevated bilirubin as seen during ER visit for symptoms suspicious of acute gastroenteritis viral. Not tested for viral hepatitis at the time.     History of elevated bilirubin since at least 2016    History of flash filling hemangioma on CT scan 2021 and 2016. Mild fatty liver seen 2021.   AST, ALT wnl. APhos mildly low.   Albumin and platelets within normal limits.     Initial history 05/10/2022:  Melinda Mccann arrives today alone. She is well-appearing and has no complaints today.   Denies symptoms of hepatic decompensation including jaundice, ascites, cognitive problems to suggest hepatic encephalopathy, or GI bleeding.     Reports 24 hour stomach bug in February, with vomiting and nausea, food poisoning, with her mother also ill for a short period of time.     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     Reports mother and brother with elevated bilirubin, but no known Gilbert's dx, pat [facility] with etoh cirrhosis.     She takes a multivitamin, melatonin PRN, IUD kyleena, vyvanse, and has tapered off of zoloft. Also on tretinoin.     Supplements:  Denies new supplements. Takes multivitamin  New rx: denies  ETOH: 2-3 margaritas a weekend   Smoking: denies   Illicit substances: denies   Occupation: medical student  Hepatitis vaccination: endorses prior vaccination. Negative titers 2018        PMH Melinda   PSXH Melinda has a past surgical history that includes [place] tooth extraction and Colonoscopy (N/A, 5/17/2016).    Melinda's family history includes Hyperlipidemia in her mother; Mitral valve prolapse in her mother; Osteoporosis in her mother; Stroke in her maternal aunt.    Melinda reports that she has never smoked. She has never used smokeless  "tobacco. She reports current alcohol use. She reports that she does not use drugs.   KALIE Lawrence has No Known Allergies.   WIN Lawrence has a current medication list which includes the following prescription(s): dapsone, escitalopram oxalate, escitalopram oxalate, levonorgestrel, lisdexamfetamine, lisdexamfetamine, misoprostol, multivitamin, nystatin, ondansetron, promethazine, promethazine, tretinoin, tretinoin, and vyvanse, and the following Facility-Administered Medications: levonorgestrel.           ROS:   GENERAL: Denies fatigue  HEENT: Denies yellowing of eyes   CARDIOVASCULAR: Denies edema  GI: Denies abdominal pain  SKIN: Denies rash, itching   NEURO: Denies confusion, memory loss, or mood changes  HEME/LYMPH: Denies easy bruising or bleeding      Objective     /76   Pulse 72   Temp 98.3 °F (36.8 °C)   Resp 18   Ht 5' 7" (1.702 m)   Wt 59.3 kg (130 lb 11.4 oz)   BMI 20.47 kg/m²     PHYSICAL EXAM:   Friendly , in no acute distress; alert and oriented to person, place and time  EYES: Sclerae anicteric  GI: Soft, non-tender, non-distended. No ascites.  EXTREMITIES:  No edema.  SKIN: Warm and dry. No jaundice. No telangectasias noted. No palmar erythema.  NEURO:  Normal gait. No asterixis.  PSYCH:  Memory intact. Thought and speech pattern appropriate. Behavior normal      DIAGNOSTICS    Lab Results   Component Value Date    ALT 12 04/22/2022    AST 15 04/22/2022    ALKPHOS 53 (L) 04/22/2022    BILITOT 3.3 (H) 04/22/2022    ALBUMIN 4.0 04/22/2022     04/22/2022     No results found for: AFP    In relation to metabolic risk factors:   Lab Results   Component Value Date    HGBA1C 5.0 03/09/2022    CHOL 181 03/09/2022    TSH 1.030 05/04/2016     Body mass index is 20.47 kg/m².      Prior serologic workup:   Lab Results   Component Value Date    HEPCAB Negative 05/10/2018       Imaging:    CT Abdomen and Pelvis Without and With Intravenous Contrast 5/20/2021    CLINICAL HISTORY:     Hematuria, " unknown cause Hematuria     TECHNIQUE:     Axial computed tomography images of the abdomen and pelvis without and with intravenous contrast.  This CT exam was performed using one or more of the following dose reduction techniques:  automated exposure control, adjustment of the mA and/or kV according to patient size, and/or use of iterative reconstruction technique.     COMPARISON:     No comparison available     FINDINGS:     Lung bases are clear. Evaluation of the abdominal contents somewhat limited by lack of intra-abdominal fat. Probable mild fatty infiltration of the liver. 2.1 cm hyperenhancing area seen in the posterior right lobe of the liver. Otherwise, no acute abnormality identified in the liver, bladder, adrenal glands, pancreas, or spleen. No acute renal abnormality identified. There is no hydronephrosis. Visualized portions of the appendix appear normal. No pericecal inflammatory changes identified. No acute abnormality identified in the bowel or mesentery. The abdominal aorta appears appropriate in course and caliber.       Pelvis: Bladder is incompletely distended but grossly unremarkable. IUD present within the uterus. Otherwise, no acute abnormality identified in the uterus or left adnexal region. There is 3.6 cm right ovarian cyst. Free fluid present in the pelvis. No free air or focal drainable fluid collection identified. No acute osseous or soft tissue abnormality identified. The ureters are difficult to visualize throughout their entirety again given a lack of intra-abdominal fat. Visualized portions of the ureters appear grossly appropriate in course and caliber. No definitive radiopaque intraureteral stone identified.     IMPRESSION:   1.  No acute findings identified in the abdomen or pelvis.   2.  No evidence of urolithiasis.   3.  Visualized portions of the appendix appear normal.   4.  3.6 cm right ovarian cyst. If there are any specific right pelvic symptoms, ultrasound could be  considered.   5.  Free fluid in the pelvis.   6.  2.1 cm hyperenhancing lesion in the right lobe of the liver near the hepatic dome. Given patient's age, this may just reflect a flash filling hemangioma. This could be confirmed however with a multiphase CT or MRI of the liver.   7.  Probable mild fatty infiltration of the liver.       Assessment/Plan     27 y.o. female with:    1. Hyperbilirubinemia  - chronic, currently unknown if conjugated vs unconjugated, recommend fractionation and Gilbert's testing  - recently 3.3, in 2021 3.2, in 2016 1.6  - with reported family history of elevated bilirubin, paternal uncle with ETOH cirrhosis   - without icteric illness, RUQ pain, or suspicious symptoms      - recommend work-up out of abundance of caution:  - Hepatic Function Panel; Future  - UGT1A1 Genotype; Future  - Hepatitis B Surface Antigen; Future  - Hepatitis C Antibody; Future  - Hepatitis B Core Antibody, Total; Future  - Hepatitis B Surface Ab, Qualitative; Future  - Hepatitis A antibody, IgG; Future  - US Abdomen Limited; Future    2. Liver hemangioma  - seen 2016 & 2021   - will obtain ultrasound   - discussed hemangiomas are not typically monitored       Orders Placed This Encounter   Procedures    US Abdomen Limited    Hepatic Function Panel    UGT1A1 Genotype    Hepatitis B Surface Antigen    Hepatitis C Antibody    Hepatitis B Core Antibody, Total    Hepatitis B Surface Ab, Qualitative    Hepatitis A antibody, IgG         Labs and ultrasound soon.   Gilbert's testing.  I note history of hemangioma on prior CT scan (2021 & 2016).   Follow up for pending results of workup..    I spent 45 minutes on the day of this encounter preparing for, evaluating, treating, and managing this patient.     Thank you for allowing me to participate in the care of JALEEL KrishnamurthyC  Hepatology & Liver Transplant

## 2022-05-13 LAB
ANNOTATION COMMENT IMP: NORMAL
GENETICIST REVIEW: NORMAL
PROVIDER SIGNING NAME: NORMAL
TEST PERFORMANCE INFO SPEC: NORMAL
UGT1A1 GENE PROD MET ACT IMP BLD/T-IMP: NORMAL
UGT1A1 GENOTYPE: NORMAL
UGT1A1 METHOD: NORMAL

## 2022-05-16 LAB
HAV IGG SER QL IA: NEGATIVE
HBV CORE AB SERPL QL IA: NEGATIVE
HBV SURFACE AB SER-ACNC: POSITIVE M[IU]/ML
HBV SURFACE AG SERPL QL IA: NEGATIVE
HCV AB SERPL QL IA: NEGATIVE

## 2022-05-30 ENCOUNTER — PATIENT MESSAGE (OUTPATIENT)
Dept: FAMILY MEDICINE | Facility: CLINIC | Age: 27
End: 2022-05-30

## 2022-05-30 ENCOUNTER — OFFICE VISIT (OUTPATIENT)
Dept: FAMILY MEDICINE | Facility: CLINIC | Age: 27
End: 2022-05-30
Payer: MEDICAID

## 2022-05-30 VITALS
OXYGEN SATURATION: 99 % | DIASTOLIC BLOOD PRESSURE: 84 MMHG | BODY MASS INDEX: 20.25 KG/M2 | TEMPERATURE: 98 F | HEART RATE: 95 BPM | WEIGHT: 129 LBS | SYSTOLIC BLOOD PRESSURE: 110 MMHG | HEIGHT: 67 IN

## 2022-05-30 DIAGNOSIS — F90.0 ATTENTION DEFICIT HYPERACTIVITY DISORDER (ADHD), PREDOMINANTLY INATTENTIVE TYPE: Primary | ICD-10-CM

## 2022-05-30 DIAGNOSIS — F43.22 ADJUSTMENT DISORDER WITH ANXIOUS MOOD: ICD-10-CM

## 2022-05-30 DIAGNOSIS — Z11.1 SCREENING-PULMONARY TB: Primary | ICD-10-CM

## 2022-05-30 PROCEDURE — 99213 OFFICE O/P EST LOW 20 MIN: CPT | Mod: S$PBB,,, | Performed by: FAMILY MEDICINE

## 2022-05-30 PROCEDURE — 99213 OFFICE O/P EST LOW 20 MIN: CPT | Mod: PBBFAC,PO | Performed by: FAMILY MEDICINE

## 2022-05-30 PROCEDURE — 3079F DIAST BP 80-89 MM HG: CPT | Mod: CPTII,,, | Performed by: FAMILY MEDICINE

## 2022-05-30 PROCEDURE — 1159F PR MEDICATION LIST DOCUMENTED IN MEDICAL RECORD: ICD-10-PCS | Mod: CPTII,,, | Performed by: FAMILY MEDICINE

## 2022-05-30 PROCEDURE — 3074F PR MOST RECENT SYSTOLIC BLOOD PRESSURE < 130 MM HG: ICD-10-PCS | Mod: CPTII,,, | Performed by: FAMILY MEDICINE

## 2022-05-30 PROCEDURE — 3044F HG A1C LEVEL LT 7.0%: CPT | Mod: CPTII,,, | Performed by: FAMILY MEDICINE

## 2022-05-30 PROCEDURE — 3079F PR MOST RECENT DIASTOLIC BLOOD PRESSURE 80-89 MM HG: ICD-10-PCS | Mod: CPTII,,, | Performed by: FAMILY MEDICINE

## 2022-05-30 PROCEDURE — 3008F BODY MASS INDEX DOCD: CPT | Mod: CPTII,,, | Performed by: FAMILY MEDICINE

## 2022-05-30 PROCEDURE — 99999 PR PBB SHADOW E&M-EST. PATIENT-LVL III: CPT | Mod: PBBFAC,,, | Performed by: FAMILY MEDICINE

## 2022-05-30 PROCEDURE — 3008F PR BODY MASS INDEX (BMI) DOCUMENTED: ICD-10-PCS | Mod: CPTII,,, | Performed by: FAMILY MEDICINE

## 2022-05-30 PROCEDURE — 1160F PR REVIEW ALL MEDS BY PRESCRIBER/CLIN PHARMACIST DOCUMENTED: ICD-10-PCS | Mod: CPTII,,, | Performed by: FAMILY MEDICINE

## 2022-05-30 PROCEDURE — 3044F PR MOST RECENT HEMOGLOBIN A1C LEVEL <7.0%: ICD-10-PCS | Mod: CPTII,,, | Performed by: FAMILY MEDICINE

## 2022-05-30 PROCEDURE — 1160F RVW MEDS BY RX/DR IN RCRD: CPT | Mod: CPTII,,, | Performed by: FAMILY MEDICINE

## 2022-05-30 PROCEDURE — 99999 PR PBB SHADOW E&M-EST. PATIENT-LVL III: ICD-10-PCS | Mod: PBBFAC,,, | Performed by: FAMILY MEDICINE

## 2022-05-30 PROCEDURE — 3074F SYST BP LT 130 MM HG: CPT | Mod: CPTII,,, | Performed by: FAMILY MEDICINE

## 2022-05-30 PROCEDURE — 99213 PR OFFICE/OUTPT VISIT, EST, LEVL III, 20-29 MIN: ICD-10-PCS | Mod: S$PBB,,, | Performed by: FAMILY MEDICINE

## 2022-05-30 PROCEDURE — 1159F MED LIST DOCD IN RCRD: CPT | Mod: CPTII,,, | Performed by: FAMILY MEDICINE

## 2022-05-30 NOTE — PROGRESS NOTES
Assessment & Plan  Attention deficit hyperactivity disorder (ADHD), predominantly inattentive type  -     TOXICOLOGY SCREEN, URINE, RANDOM (COMPLIANCE); Future; Expected date: 05/30/2022    Adjustment disorder with anxious mood      Patient has discontinued Lexapro and will remain on this medication.  Continue Vyvanse as prescribed. Patient to submit urine drug screen before her next virtual follow-up in 6 months.      Follow-up: Follow up in about 6 months (around 11/30/2022) for Virtual Visit for ADHD.  ______________________________________________________________________    Chief Complaint  Chief Complaint   Patient presents with    ADHD    Follow-up       HPI  Melinda Mccann is a 27 y.o. female with medical diagnoses as listed in the medical history and problem list that presents to the office to follow up on ADHD and anxiety for which she was last seen in the office on 3/8.  She is in her usual state of health today.  Patient states that she slowly weaned herself off of Lexapro to discontinuation and is no longer experiencing any anxiety.  She is pleased to no longer be experiencing any of the sexual side effects of Lexapro.  She recently matched into a psychiatry program through [facility].  She took a break from her Vyvanse for while and has recently restarted this.  She experiences some sweating with the medication but otherwise tolerates it well    Health Maintenance       Date Due Completion Date    Pap Smear 05/09/2020 5/9/2017    TETANUS VACCINE 05/15/2028 5/15/2018            PAST MEDICAL HISTORY:  Past Medical History:   Diagnosis Date    Accessory navicular bone of foot     right    ADD (attention deficit disorder)     Cancer     Dysplastic nevus     Melanoma     Still's murmur        PAST SURGICAL HISTORY:  Past Surgical History:   Procedure Laterality Date    COLONOSCOPY N/A 5/17/2016    Procedure: COLONOSCOPY;  Surgeon: ROSY Ferguson MD;  Location: [facility] ([address] [place] [address]);   Service: Endoscopy;  Laterality: N/A;    WISDOM TOOTH EXTRACTION         SOCIAL HISTORY:  Social History     Socioeconomic History    Marital status: Single   Occupational History    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 No    Breast-feeding No       FAMILY HISTORY:  Family History   Problem Relation Age of Onset    Mitral valve prolapse Mother     Hyperlipidemia Mother     Osteoporosis Mother     Stroke Maternal Aunt         maternal great aunt    Cirrhosis Paternal Uncle     Breast cancer Neg Hx     Colon cancer Neg Hx     Ovarian cancer Neg Hx     Diabetes Neg Hx     Hypertension Neg Hx     Melanoma Neg Hx     Esophageal cancer Neg Hx        ALLERGIES AND MEDICATIONS: updated and reviewed.  Review of patient's allergies indicates:  No Known Allergies  Current Outpatient Medications   Medication Sig Dispense Refill    lisdexamfetamine (VYVANSE) 40 MG Cap Take 1 capsule (40 mg total) by mouth once daily. 30 capsule 0    multivitamin (THERAGRAN) per tablet Take 1 tablet by mouth once daily.      nystatin (MYCOSTATIN) cream Apply topically 2 (two) times daily.      tretinoin (RETIN-A) 0.025 % cream Apply topically nightly. 45 g 0    tretinoin (RETIN-A) 0.05 % cream SMARTSI Topical Every Night       Current Facility-Administered Medications   Medication Dose Route Frequency Provider Last Rate Last Admin    levonorgestrel 17.5 mcg/24 hrs (5 yrs) 19.5 mg IUD 17.5 mcg  17.5 mcg Intrauterine  Marleni Armas DO   17.5 mcg at 19 1358         ROS  Review of Systems   Constitutional: Positive for diaphoresis. Negative for activity change, appetite change, fatigue and  "unexpected weight change.   Respiratory: Negative for chest tightness and shortness of breath.    Cardiovascular: Negative for chest pain and palpitations.   Gastrointestinal: Negative for abdominal pain, constipation and diarrhea.   Neurological: Negative for dizziness, tremors and headaches.   Psychiatric/Behavioral: Negative for agitation, decreased concentration, dysphoric mood and sleep disturbance. The patient is not nervous/anxious.            Physical Exam  Vitals:    05/30/22 0802   BP: 110/84   BP Location: Right arm   Patient Position: Sitting   BP Method: Medium (Manual)   Pulse: 95   Temp: 98.3 °F (36.8 °C)   TempSrc: Oral   SpO2: 99%   Weight: 58.5 kg (128 lb 15.5 oz)   Height: 5' 7" (1.702 m)    Body mass index is 20.2 kg/m².  Weight: 58.5 kg (128 lb 15.5 oz)   Height: 5' 7" (170.2 cm)   Physical Exam  Constitutional:       General: She is not in acute distress.     Appearance: Normal appearance.   HENT:      Head: Normocephalic and atraumatic.   Neck:      Thyroid: No thyromegaly.   Cardiovascular:      Rate and Rhythm: Normal rate and regular rhythm.      Pulses: Normal pulses.      Heart sounds: Normal heart sounds.   Pulmonary:      Effort: Pulmonary effort is normal. No respiratory distress.      Breath sounds: Normal breath sounds.   Musculoskeletal:      Cervical back: Neck supple.      Right lower leg: No edema.      Left lower leg: No edema.   Lymphadenopathy:      Cervical: No cervical adenopathy.   Skin:     General: Skin is warm and dry.      Findings: No rash.   Neurological:      General: No focal deficit present.      Mental Status: She is alert and oriented to person, place, and time.   Psychiatric:         Mood and Affect: Mood normal.         Behavior: Behavior normal.         Thought Content: Thought content normal.             "

## 2022-05-31 ENCOUNTER — CLINICAL SUPPORT (OUTPATIENT)
Dept: FAMILY MEDICINE | Facility: CLINIC | Age: 27
End: 2022-05-31
Payer: MEDICAID

## 2022-05-31 DIAGNOSIS — Z11.1 SCREENING-PULMONARY TB: ICD-10-CM

## 2022-05-31 PROCEDURE — 99999 PR PBB SHADOW E&M-EST. PATIENT-LVL I: ICD-10-PCS | Mod: PBBFAC,,,

## 2022-05-31 PROCEDURE — 86580 TB INTRADERMAL TEST: CPT | Mod: PBBFAC,PO

## 2022-05-31 PROCEDURE — 99211 OFF/OP EST MAY X REQ PHY/QHP: CPT | Mod: PBBFAC,PO

## 2022-05-31 PROCEDURE — 99999 PR PBB SHADOW E&M-EST. PATIENT-LVL I: CPT | Mod: PBBFAC,,,

## 2022-05-31 NOTE — PROGRESS NOTES
Pt tolerated PPD to left mid forearm without difficulty; wheal noted; no adverse reaction; pt informed to return in 48-72 hours to have test read

## 2022-06-02 LAB
TB INDURATION - 48 HR READ: 0 MM
TB INDURATION - 72 HR READ: NORMAL
TB SKIN TEST - 48 HR READ: NEGATIVE
TB SKIN TEST - 72 HR READ: NORMAL

## 2022-06-08 ENCOUNTER — HOSPITAL ENCOUNTER (OUTPATIENT)
Dept: RADIOLOGY | Facility: HOSPITAL | Age: 27
Discharge: HOME | End: 2022-06-08
Attending: PHYSICIAN ASSISTANT
Payer: MEDICAID

## 2022-06-08 DIAGNOSIS — E80.6 HYPERBILIRUBINEMIA: ICD-10-CM

## 2022-06-08 PROCEDURE — 76705 ECHO EXAM OF ABDOMEN: CPT | Mod: 26,,, | Performed by: RADIOLOGY

## 2022-06-08 PROCEDURE — 76705 US ABDOMEN LIMITED: ICD-10-PCS | Mod: 26,,, | Performed by: RADIOLOGY

## 2022-06-08 PROCEDURE — 76705 ECHO EXAM OF ABDOMEN: CPT | Mod: TC

## 2022-06-14 ENCOUNTER — TELEPHONE (OUTPATIENT)
Dept: HEPATOLOGY | Facility: CLINIC | Age: 27
End: 2022-06-14
Payer: MEDICAID

## 2022-06-14 ENCOUNTER — PATIENT MESSAGE (OUTPATIENT)
Dept: HEPATOLOGY | Facility: CLINIC | Age: 27
End: 2022-06-14
Payer: MEDICAID

## 2022-06-14 DIAGNOSIS — R16.0 HEPATOMEGALY: Primary | ICD-10-CM

## 2022-06-20 ENCOUNTER — PROCEDURE VISIT (OUTPATIENT)
Dept: HEPATOLOGY | Facility: CLINIC | Age: 27
End: 2022-06-20
Payer: MEDICAID

## 2022-06-20 ENCOUNTER — TELEPHONE (OUTPATIENT)
Dept: HEPATOLOGY | Facility: CLINIC | Age: 27
End: 2022-06-20

## 2022-06-20 ENCOUNTER — OFFICE VISIT (OUTPATIENT)
Dept: HEPATOLOGY | Facility: CLINIC | Age: 27
End: 2022-06-20
Payer: MEDICAID

## 2022-06-20 VITALS
OXYGEN SATURATION: 98 % | HEART RATE: 60 BPM | HEIGHT: 67 IN | TEMPERATURE: 97 F | RESPIRATION RATE: 18 BRPM | SYSTOLIC BLOOD PRESSURE: 122 MMHG | BODY MASS INDEX: 20.38 KG/M2 | WEIGHT: 129.88 LBS | DIASTOLIC BLOOD PRESSURE: 84 MMHG

## 2022-06-20 DIAGNOSIS — R16.0 HEPATOMEGALY: ICD-10-CM

## 2022-06-20 DIAGNOSIS — E80.4 GILBERT'S SYNDROME: Primary | ICD-10-CM

## 2022-06-20 DIAGNOSIS — D18.03 LIVER HEMANGIOMA: ICD-10-CM

## 2022-06-20 PROCEDURE — 91200 LIVER ELASTOGRAPHY: CPT | Mod: PBBFAC | Performed by: PHYSICIAN ASSISTANT

## 2022-06-20 PROCEDURE — 3079F DIAST BP 80-89 MM HG: CPT | Mod: CPTII,,, | Performed by: PHYSICIAN ASSISTANT

## 2022-06-20 PROCEDURE — 1159F MED LIST DOCD IN RCRD: CPT | Mod: CPTII,,, | Performed by: PHYSICIAN ASSISTANT

## 2022-06-20 PROCEDURE — 3044F HG A1C LEVEL LT 7.0%: CPT | Mod: CPTII,,, | Performed by: PHYSICIAN ASSISTANT

## 2022-06-20 PROCEDURE — 99999 PR PBB SHADOW E&M-EST. PATIENT-LVL IV: ICD-10-PCS | Mod: PBBFAC,,, | Performed by: PHYSICIAN ASSISTANT

## 2022-06-20 PROCEDURE — 3008F BODY MASS INDEX DOCD: CPT | Mod: CPTII,,, | Performed by: PHYSICIAN ASSISTANT

## 2022-06-20 PROCEDURE — 99213 PR OFFICE/OUTPT VISIT, EST, LEVL III, 20-29 MIN: ICD-10-PCS | Mod: S$PBB,,, | Performed by: PHYSICIAN ASSISTANT

## 2022-06-20 PROCEDURE — 91200 LIVER ELASTOGRAPHY: CPT | Mod: 26,S$PBB,, | Performed by: PHYSICIAN ASSISTANT

## 2022-06-20 PROCEDURE — 99999 PR PBB SHADOW E&M-EST. PATIENT-LVL IV: CPT | Mod: PBBFAC,,, | Performed by: PHYSICIAN ASSISTANT

## 2022-06-20 PROCEDURE — 1160F RVW MEDS BY RX/DR IN RCRD: CPT | Mod: CPTII,,, | Performed by: PHYSICIAN ASSISTANT

## 2022-06-20 PROCEDURE — 3074F PR MOST RECENT SYSTOLIC BLOOD PRESSURE < 130 MM HG: ICD-10-PCS | Mod: CPTII,,, | Performed by: PHYSICIAN ASSISTANT

## 2022-06-20 PROCEDURE — 99214 OFFICE O/P EST MOD 30 MIN: CPT | Mod: PBBFAC | Performed by: PHYSICIAN ASSISTANT

## 2022-06-20 PROCEDURE — 3079F PR MOST RECENT DIASTOLIC BLOOD PRESSURE 80-89 MM HG: ICD-10-PCS | Mod: CPTII,,, | Performed by: PHYSICIAN ASSISTANT

## 2022-06-20 PROCEDURE — 3044F PR MOST RECENT HEMOGLOBIN A1C LEVEL <7.0%: ICD-10-PCS | Mod: CPTII,,, | Performed by: PHYSICIAN ASSISTANT

## 2022-06-20 PROCEDURE — 3074F SYST BP LT 130 MM HG: CPT | Mod: CPTII,,, | Performed by: PHYSICIAN ASSISTANT

## 2022-06-20 PROCEDURE — 91200 FIBROSCAN (VIBRATION CONTROLLED TRANSIENT ELASTOGRAPHY): ICD-10-PCS | Mod: 26,S$PBB,, | Performed by: PHYSICIAN ASSISTANT

## 2022-06-20 PROCEDURE — 1159F PR MEDICATION LIST DOCUMENTED IN MEDICAL RECORD: ICD-10-PCS | Mod: CPTII,,, | Performed by: PHYSICIAN ASSISTANT

## 2022-06-20 PROCEDURE — 1160F PR REVIEW ALL MEDS BY PRESCRIBER/CLIN PHARMACIST DOCUMENTED: ICD-10-PCS | Mod: CPTII,,, | Performed by: PHYSICIAN ASSISTANT

## 2022-06-20 PROCEDURE — 99213 OFFICE O/P EST LOW 20 MIN: CPT | Mod: S$PBB,,, | Performed by: PHYSICIAN ASSISTANT

## 2022-06-20 PROCEDURE — 3008F PR BODY MASS INDEX (BMI) DOCUMENTED: ICD-10-PCS | Mod: CPTII,,, | Performed by: PHYSICIAN ASSISTANT

## 2022-06-20 NOTE — PATIENT INSTRUCTIONS
Fibroscan looks normal   Gilbert's confirmed  Can follow-up with primary annual      Your lab testing confirmed that your bilirubin is likely elevated due to something called Gilbert's, which is not worrisome.     Gilbert's syndrome can cause mild jaundice from time to time. It is usually harmless and does not require treatment. It is due to a reduced amount of a chemical in the liver, which processes a breakdown product of blood cells, called bilirubin.    The take home message is - Gilbert's syndrome is really a mild abnormality of how the liver processes a chemical (enzyme) called bilirubin. It is not really a 'disease' and does not normally cause problems.    What is Gilbert's syndrome?  Gilbert's syndrome is a condition where the liver does not process bilirubin very well. It is sometimes called Gilbert's disease although it does not cause 'disease' as such. The liver itself is normal and the condition is harmless. The condition is named after the doctor who first described it in 1901.    What is bilirubin and what happens in Gilbert's syndrome?  Bilirubin is constantly being made. It is a breakdown product of haemoglobin. Haemoglobin is a chemical that is in red blood cells and carries oxygen to the tissues. Many red blood cells break down each day releasing haemoglobin, and bilirubin is one of the waste products. Bilirubin is carried in the bloodstream to the liver where it is taken in by liver cells. The liver cells process the bilirubin which is then passed out from the liver into the gut with the bile.    A chemical (enzyme) in liver cells, called uridine diphosphate glucuronosyltransferase (UGT), helps the liver cells to process the bilirubin. People with Gilbert's syndrome have less of this enzyme and so a backlog of bilirubin can build up in the bloodstream. A high level of bilirubin causes a yellowing of skin and the whites of the eyes (jaundice). In people with Gilbert's syndrome, the blood level of  bilirubin can go up and down. Often the level is normal. At other times it is higher than normal but not very high.    Who gets Gilbert's syndrome?  Gilbert's syndrome is a very common hereditary condition. About 1 out of every 20 people have this syndrome - but 1 in 3 people who have it will not know that they have it. It is more common in men than in women. It is often first diagnosed in the late teens or early twenties.    Gilbert's syndrome symptoms  Usually none    The level of bilirubin in the blood goes up and down. However, this does not usually cause any problems as the level does not go very high.    Jaundice  If the level of bilirubin goes above a certain level you can develop yellowing of skin and the whites of your eyes. This is because bilirubin is an orangy-yellow colour. This condition is called jaundice. Some people with Gilbert's syndrome become mildly jaundiced from time to time. This may seem alarming, but is of little concern if the cause is Gilbert's syndrome.     The jaundice tends to occur most commonly if you are ill with another problem such as:    An infection.  Starvation.  Repeatedly being sick (vomiting).  Following surgery.  During times of exertion or stress.  If you have Gilbert's syndrome there are some medicines which you should avoid if alternatives are available. These include:    Atazanavir and indinavir (used for the treatment of HIV infection).  Gemfibrozil (a cholesterol-reducing agent).  Statins (a group of cholesterol-lowering medicines) when taken with gemfibrozil.  Irinotecan (used for the treatment of advanced bowel cancer).  Nilotinib (used for the treatment of some blood cancers).    Other symptoms are uncommon    The jaundice itself does not usually cause any problems. However, some people with Gilbert's syndrome report other symptoms - most commonly:    Tiredness.  Mild weakness.  Mild tummy (abdominal) pains.  A mild feeling of sickness (nausea).    It is not clear  whether these symptoms are actually related to Gilbert's syndrome. There does not seem to be any relationship between these symptoms and the level of bilirubin in the blood. That is, these symptoms may develop whether the level of bilirubin is high or not and may be due to the problem that caused the liver to have to work a little bit harder.    Do I need any tests?  Yellowing of skin and the whites of the eyes (jaundice - a high level of bilirubin) can be caused by many different diseases of the liver and blood. Therefore, if you develop jaundice you are likely to need tests to clarify the cause and to rule out serious disease.    A blood test can usually confirm the diagnosis of Gilbert's syndrome. It shows a mildly raised level of bilirubin; however, all the other liver tests will be normal. Very rarely, other tests such as a liver biopsy may be done to rule out liver diseases if the diagnosis is in doubt.     Gilbert's syndrome is also commonly diagnosed by chance when routine blood tests that are done for other problems show a raised level of bilirubin.    Treatment for Gilbert's syndrome  No treatment is needed. People with Gilbert's syndrome lead normal healthy lives. Life expectancy is not affected and life insurance is not affected. There is even some evidence that people with Gilbert's syndrome live longer and are heathier than people who don't have it. Mild yellowing of skin and the whites of the eyes (jaundice) may come back from time to time for short periods but usually causes no health problems.    The take home message is - Gilbert's syndrome is really a mild abnormality of how the liver processes a chemical (enzyme) called bilirubin. It is not really a 'disease' and does not normally cause problems.

## 2022-06-20 NOTE — PROGRESS NOTES
Hepatology Consultation: [facility]    EST PATIENT  PCP: Deborah Levi, DO    Subjective      Ms. Mccann is a 27 y.o. female with PMH as listed below who presents to clinic for elevated bilirubin as seen during ER visit for symptoms suspicious of acute gastroenteritis viral. Not tested for viral hepatitis at the time.     History of elevated bilirubin since at least 2016    History of flash filling hemangioma on CT scan 2021 and 2016. Mild fatty liver seen 2021.   AST, ALT wnl. APhos mildly low.   Albumin and platelets within normal limits.     Initial history 05/10/2022:  Melinda Mccann arrives today alone. She is well-appearing and has no complaints today.   Denies symptoms of hepatic decompensation including jaundice, ascites, cognitive problems to suggest hepatic encephalopathy, or GI bleeding.     Reports 24 hour stomach bug in February, with vomiting and nausea, food poisoning, with her mother also ill for a short period of time.     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     Reports mother and brother with elevated bilirubin, but no known Gilbert's dx, pat [facility] with etoh cirrhosis.     She takes a multivitamin, melatonin PRN, IUD kyleena, vyvanse, and has tapered off of zoloft. Also on tretinoin.     Interval history 06/20/2022:  Melinda Mccann arrives today alone.   Since our last visit, no new issues, medications or supplements.   No complaints today.   Last ultrasound suggested hepatomegaly. Fibroscan performed out of an abundance of caution.   Gilbert's testing performed.     I reviewed her medical history - reports melanoma 2016, no other cancer diagnoses. Will reach out to derm to clarify.    Supplements:  Denies new supplements. Takes multivitamin  New rx: denies  ETOH: 2-3 margaritas a weekend   Smoking: denies   Illicit substances: denies   Occupation: medical student  Hepatitis vaccination: endorses prior  "vaccination. Negative titers 2018        PMH Melinda has a history of melanoma.   PSXH Melinda has a past surgical history that includes [place] tooth extraction and Colonoscopy (N/A, 5/17/2016).   FH Melinda's family history includes Cirrhosis in her paternal uncle; Hyperlipidemia in her mother; Mitral valve prolapse in her mother; Osteoporosis in her mother; Stroke in her maternal aunt.   SH Melinda reports that she has never smoked. She has never used smokeless tobacco. She reports current alcohol use. She reports that she does not use drugs.   ALG Melinda has No Known Allergies.   MED Melinda has a current medication list which includes the following prescription(s): lisdexamfetamine, multivitamin, nystatin, tretinoin, and tretinoin, and the following Facility-Administered Medications: levonorgestrel.           ROS:   As per hpi       Objective     /84 (BP Location: Right arm, Patient Position: Sitting, BP Method: Medium (Automatic))   Pulse 60   Temp 97.2 °F (36.2 °C) (Oral)   Resp 18   Ht 5' 7" (1.702 m)   Wt 58.9 kg (129 lb 13.6 oz)   LMP 05/28/2022 (Approximate)   SpO2 98%   BMI 20.34 kg/m²     PHYSICAL EXAM:   Friendly , in no acute distress; alert and oriented to person, place and time  EYES: Sclerae anicteric  NEURO: normal gait.   PSYCH:  Memory intact. Thought and speech pattern appropriate. Behavior normal      DIAGNOSTICS    Lab Results   Component Value Date    ALT 12 05/10/2022    AST 14 05/10/2022    ALKPHOS 66 05/10/2022    BILITOT 3.8 (H) 05/10/2022    ALBUMIN 4.8 05/10/2022     04/22/2022     No results found for: AFP    In relation to metabolic risk factors:   Lab Results   Component Value Date    HGBA1C 5.0 03/09/2022    CHOL 181 03/09/2022    TSH 1.030 05/04/2016     Body mass index is 20.34 kg/m².      Prior serologic workup:   Lab Results   Component Value Date    HEPBSAG Negative 05/10/2022    HEPCAB Negative 05/10/2022       Imaging:    CT Abdomen and " Pelvis Without and With Intravenous Contrast 5/20/2021    CLINICAL HISTORY:     Hematuria, unknown cause Hematuria     TECHNIQUE:     Axial computed tomography images of the abdomen and pelvis without and with intravenous contrast.  This CT exam was performed using one or more of the following dose reduction techniques:  automated exposure control, adjustment of the mA and/or kV according to patient size, and/or use of iterative reconstruction technique.     COMPARISON:     No comparison available     FINDINGS:     Lung bases are clear. Evaluation of the abdominal contents somewhat limited by lack of intra-abdominal fat. Probable mild fatty infiltration of the liver. 2.1 cm hyperenhancing area seen in the posterior right lobe of the liver. Otherwise, no acute abnormality identified in the liver, bladder, adrenal glands, pancreas, or spleen. No acute renal abnormality identified. There is no hydronephrosis. Visualized portions of the appendix appear normal. No pericecal inflammatory changes identified. No acute abnormality identified in the bowel or mesentery. The abdominal aorta appears appropriate in course and caliber.       Pelvis: Bladder is incompletely distended but grossly unremarkable. IUD present within the uterus. Otherwise, no acute abnormality identified in the uterus or left adnexal region. There is 3.6 cm right ovarian cyst. Free fluid present in the pelvis. No free air or focal drainable fluid collection identified. No acute osseous or soft tissue abnormality identified. The ureters are difficult to visualize throughout their entirety again given a lack of intra-abdominal fat. Visualized portions of the ureters appear grossly appropriate in course and caliber. No definitive radiopaque intraureteral stone identified.     IMPRESSION:   1.  No acute findings identified in the abdomen or pelvis.   2.  No evidence of urolithiasis.   3.  Visualized portions of the appendix appear normal.   4.  3.6 cm right  ovarian cyst. If there are any specific right pelvic symptoms, ultrasound could be considered.   5.  Free fluid in the pelvis.   6.  2.1 cm hyperenhancing lesion in the right lobe of the liver near the hepatic dome. Given patient's age, this may just reflect a flash filling hemangioma. This could be confirmed however with a multiphase CT or MRI of the liver.   7.  Probable mild fatty infiltration of the liver.       US Abdomen Limited  Narrative: EXAMINATION:  US ABDOMEN LIMITED    CLINICAL HISTORY:  Other disorders of bilirubin metabolism    TECHNIQUE:  Limited ultrasound of the right upper quadrant of the abdomen (including pancreas, liver, gallbladder, common bile duct, and spleen) was performed.    COMPARISON:  06/01/2016    FINDINGS:  Liver: Enlarged measuring 18.9 cm. A predominantly hyperechoic lesion is present in the right hepatic lobe that measures up to 2.2 cm.  This lesion is not substantially changed in size when compared to a CT from 06/01/2016.    Gallbladder: No calculi, wall thickening, or pericholecystic fluid.  No sonographic Harper's sign.    Biliary system: The common duct is not dilated, measuring 2 mm.  No intrahepatic ductal dilatation.    Spleen: Normal in size and echotexture, measuring 10.7 x 5.1 cm.    Miscellaneous: No upper abdominal ascites.  Impression: Hepatomegaly.    Right hepatic lobe lesion, unchanged when compared to 06/01/2016.    Electronically signed by: Miladys Davidson  Date:    06/08/2022  Time:    11:27      Findings Fibroscan 06/20/2022  Median liver stiffness score:  6.1  CAP Reading: dB/m:  202     IQR/med %:  15  Interpretation  Fibrosis interpretation is based on medial liver stiffness - Kilopascal (kPa).     Fibrosis Stage:  F 0-1  Steatosis interpretation is based on controlled attenuation parameter - (dB/m).     Steatosis Grade:  <S1    Assessment/Plan     27 y.o. female with:    1. Hyperbilirubinemia from Gilbert's syndrome, confirmed 2022   - chronic,due to  Gilbert's   - recently 3.3, in 2021 3.2, in 2016 1.6  - with reported family history of elevated bilirubin, paternal uncle with ETOH cirrhosis   - without icteric illness, RUQ pain, or suspicious symptoms      5/10/2022:  UGT1A1 Genotype Homozygous *28 (TA7/TA7)    UGT1A1 Phenotype Poor metabolizer          2. Liver hemangioma  - seen 2016 & 2021   - Ultrasound 2022 suggests no significant change since 2016. No monitoring required.   -    3. Hepatomegaly  - currently not clinically significant; Fibroscan suggests no steatosis and no fibrosis, reassuringly  - transaminases to be monitored with PCP, can f/u if needed in the future.  - occasionally seen with Gilbert's syndrome      · Gilbert's confirmed.   · Fibroscan today without advanced fibrosis or steatosis.   · Hemangioma unchanged.   · Follow-up as needed    I spent 20 minutes on the day of this encounter preparing for, evaluating, treating, and managing this patient.     Thank you for allowing me to participate in the care of Melinda Frederick PA-C  Hepatology & Liver Transplant

## 2022-06-20 NOTE — PROCEDURES
FibroScan (Vibration Controlled Transient Elastography)    Date/Time: 6/20/2022 10:45 AM  Performed by: Cindy Frederick PA-C  Authorized by: Cindy Frederick PA-C     Diagnosis:  Cryptogenic    Probe:  M    Universal Protocol: Patient's identity, procedure and site were verified, confirmatory pause was performed.  Discussed procedure including risks and potential complications.  Questions answered.  Patient verbalizes understanding and wishes to proceed with VCTE.     Procedure: After providing explanations of the procedure, patient was placed in the supine position with right arm in maximum abduction to allow optimal exposure of right lateral abdomen.  Patient was briefly assessed, Testing was performed in the mid-axillary location, 50Hz Shear Wave pulses were applied and the resulting Shear Wave and Propagation Speed detected with a 3.5 MHz ultrasonic signal, using the FibroScan probe, Skin to liver capsule distance and liver parenchyma were accessed during the entire examination with the FibroScan probe, Patient was instructed to breathe normally and to abstain from sudden movements during the procedure, allowing for random measurements of liver stiffness. At least 10 Shear Waves were produced, Individual measurements of each Shear Wave were calculated.  Patient tolerated the procedure well with no complications.  Meets discharge criteria as was dismissed.  Rates pain 0 out of 10.  Patient will follow up with ordering provider to review results.      Findings  Median liver stiffness score:  6.1  CAP Reading: dB/m:  202    IQR/med %:  15  Interpretation  Fibrosis interpretation is based on medial liver stiffness - Kilopascal (kPa).    Fibrosis Stage:  F 0-1  Steatosis interpretation is based on controlled attenuation parameter - (dB/m).    Steatosis Grade:  <S1

## 2022-07-21 ENCOUNTER — PATIENT MESSAGE (OUTPATIENT)
Dept: FAMILY MEDICINE | Facility: CLINIC | Age: 27
End: 2022-07-21
Payer: MEDICAID

## 2022-07-21 DIAGNOSIS — F90.0 ATTENTION DEFICIT HYPERACTIVITY DISORDER (ADHD), PREDOMINANTLY INATTENTIVE TYPE: ICD-10-CM

## 2022-07-21 RX ORDER — LISDEXAMFETAMINE DIMESYLATE 40 MG/1
40 CAPSULE ORAL DAILY
Qty: 30 CAPSULE | Refills: 0 | Status: SHIPPED | OUTPATIENT
Start: 2022-09-19 | End: 2022-08-22 | Stop reason: SDUPTHER

## 2022-07-21 RX ORDER — LISDEXAMFETAMINE DIMESYLATE 40 MG/1
40 CAPSULE ORAL DAILY
Qty: 30 CAPSULE | Refills: 0 | Status: SHIPPED | OUTPATIENT
Start: 2022-08-20 | End: 2022-08-22 | Stop reason: SDUPTHER

## 2022-07-21 RX ORDER — LISDEXAMFETAMINE DIMESYLATE 40 MG/1
40 CAPSULE ORAL DAILY
Qty: 30 CAPSULE | Refills: 0 | Status: SHIPPED | OUTPATIENT
Start: 2022-07-21 | End: 2022-08-22 | Stop reason: SDUPTHER

## 2022-07-21 NOTE — TELEPHONE ENCOUNTER
No new care gaps identified.  [facility] Embedded Care Gaps. Reference number: [id number]. 7/21/2022   1:16:54 PM CDT

## 2022-08-22 DIAGNOSIS — F90.0 ATTENTION DEFICIT HYPERACTIVITY DISORDER (ADHD), PREDOMINANTLY INATTENTIVE TYPE: ICD-10-CM

## 2022-08-22 RX ORDER — LISDEXAMFETAMINE DIMESYLATE 40 MG/1
40 CAPSULE ORAL DAILY
Qty: 30 CAPSULE | Refills: 0 | Status: SHIPPED | OUTPATIENT
Start: 2022-09-19 | End: 2022-11-29 | Stop reason: SDUPTHER

## 2022-08-22 RX ORDER — LISDEXAMFETAMINE DIMESYLATE 40 MG/1
40 CAPSULE ORAL DAILY
Qty: 30 CAPSULE | Refills: 0 | Status: SHIPPED | OUTPATIENT
Start: 2022-10-21 | End: 2022-11-29 | Stop reason: SDUPTHER

## 2022-08-22 RX ORDER — LISDEXAMFETAMINE DIMESYLATE 40 MG/1
40 CAPSULE ORAL DAILY
Qty: 30 CAPSULE | Refills: 0 | Status: SHIPPED | OUTPATIENT
Start: 2022-08-22 | End: 2022-11-29 | Stop reason: SDUPTHER

## 2022-08-22 RX ORDER — LISDEXAMFETAMINE DIMESYLATE 40 MG/1
40 CAPSULE ORAL DAILY
Qty: 30 CAPSULE | Refills: 0 | Status: CANCELLED | OUTPATIENT
Start: 2022-08-22

## 2022-08-22 NOTE — TELEPHONE ENCOUNTER
No new care gaps identified.  [facility] Embedded Care Gaps. Reference number: [id number]. 8/22/2022   4:06:43 PM CDT

## 2022-09-13 ENCOUNTER — LAB VISIT (OUTPATIENT)
Dept: LAB | Facility: HOSPITAL | Age: 27
End: 2022-09-13
Attending: FAMILY MEDICINE
Payer: COMMERCIAL

## 2022-09-13 DIAGNOSIS — F90.0 ATTENTION DEFICIT HYPERACTIVITY DISORDER (ADHD), PREDOMINANTLY INATTENTIVE TYPE: ICD-10-CM

## 2022-09-13 LAB
AMPHET+METHAMPHET UR QL: ABNORMAL
BARBITURATES UR QL SCN>200 NG/ML: NEGATIVE
BENZODIAZ UR QL SCN>200 NG/ML: NEGATIVE
BZE UR QL SCN: NEGATIVE
CANNABINOIDS UR QL SCN: NEGATIVE
CREAT UR-MCNC: 125 MG/DL (ref 15–325)
ETHANOL UR-MCNC: <10 MG/DL
METHADONE UR QL SCN>300 NG/ML: NEGATIVE
OPIATES UR QL SCN: NEGATIVE
PCP UR QL SCN>25 NG/ML: NEGATIVE
TOXICOLOGY INFORMATION: ABNORMAL

## 2022-09-13 PROCEDURE — 80307 DRUG TEST PRSMV CHEM ANLYZR: CPT | Performed by: FAMILY MEDICINE

## 2022-11-13 ENCOUNTER — PATIENT MESSAGE (OUTPATIENT)
Dept: DERMATOLOGY | Facility: CLINIC | Age: 27
End: 2022-11-13
Payer: MEDICAID

## 2022-11-14 RX ORDER — TRETINOIN 0.5 MG/G
CREAM TOPICAL
Qty: 20 G | Refills: 1 | Status: SHIPPED | OUTPATIENT
Start: 2022-11-14 | End: 2023-12-08 | Stop reason: SDUPTHER

## 2022-11-14 RX ORDER — TRETINOIN 0.5 MG/G
CREAM TOPICAL
Qty: 20 G | Refills: 1 | Status: SHIPPED | OUTPATIENT
Start: 2022-11-14 | End: 2022-11-14 | Stop reason: SDUPTHER

## 2022-11-29 ENCOUNTER — OFFICE VISIT (OUTPATIENT)
Dept: FAMILY MEDICINE | Facility: CLINIC | Age: 27
End: 2022-11-29
Payer: MEDICAID

## 2022-11-29 ENCOUNTER — TELEPHONE (OUTPATIENT)
Dept: FAMILY MEDICINE | Facility: CLINIC | Age: 27
End: 2022-11-29

## 2022-11-29 DIAGNOSIS — F90.0 ATTENTION DEFICIT HYPERACTIVITY DISORDER (ADHD), PREDOMINANTLY INATTENTIVE TYPE: Primary | ICD-10-CM

## 2022-11-29 PROCEDURE — 99213 OFFICE O/P EST LOW 20 MIN: CPT | Mod: 95,,, | Performed by: FAMILY MEDICINE

## 2022-11-29 PROCEDURE — 99213 PR OFFICE/OUTPT VISIT, EST, LEVL III, 20-29 MIN: ICD-10-PCS | Mod: 95,,, | Performed by: FAMILY MEDICINE

## 2022-11-29 PROCEDURE — 1160F PR REVIEW ALL MEDS BY PRESCRIBER/CLIN PHARMACIST DOCUMENTED: ICD-10-PCS | Mod: CPTII,95,, | Performed by: FAMILY MEDICINE

## 2022-11-29 PROCEDURE — 3044F PR MOST RECENT HEMOGLOBIN A1C LEVEL <7.0%: ICD-10-PCS | Mod: CPTII,95,, | Performed by: FAMILY MEDICINE

## 2022-11-29 PROCEDURE — 1159F PR MEDICATION LIST DOCUMENTED IN MEDICAL RECORD: ICD-10-PCS | Mod: CPTII,95,, | Performed by: FAMILY MEDICINE

## 2022-11-29 PROCEDURE — 3044F HG A1C LEVEL LT 7.0%: CPT | Mod: CPTII,95,, | Performed by: FAMILY MEDICINE

## 2022-11-29 PROCEDURE — 1159F MED LIST DOCD IN RCRD: CPT | Mod: CPTII,95,, | Performed by: FAMILY MEDICINE

## 2022-11-29 PROCEDURE — 1160F RVW MEDS BY RX/DR IN RCRD: CPT | Mod: CPTII,95,, | Performed by: FAMILY MEDICINE

## 2022-11-29 RX ORDER — LISDEXAMFETAMINE DIMESYLATE 40 MG/1
40 CAPSULE ORAL DAILY
Qty: 30 CAPSULE | Refills: 0 | Status: SHIPPED | OUTPATIENT
Start: 2022-11-29 | End: 2023-06-12 | Stop reason: SDUPTHER

## 2022-11-29 RX ORDER — LISDEXAMFETAMINE DIMESYLATE 40 MG/1
40 CAPSULE ORAL DAILY
Qty: 30 CAPSULE | Refills: 0 | Status: SHIPPED | OUTPATIENT
Start: 2022-12-28 | End: 2023-06-12 | Stop reason: SDUPTHER

## 2022-11-29 RX ORDER — LISDEXAMFETAMINE DIMESYLATE 40 MG/1
40 CAPSULE ORAL DAILY
Qty: 30 CAPSULE | Refills: 0 | Status: SHIPPED | OUTPATIENT
Start: 2023-01-27 | End: 2023-03-07 | Stop reason: SDUPTHER

## 2022-11-29 NOTE — PROGRESS NOTES
Assessment & Plan  Attention deficit hyperactivity disorder (ADHD), predominantly inattentive type  -     lisdexamfetamine (VYVANSE) 40 MG Cap; Take 1 capsule (40 mg total) by mouth once daily.  Dispense: 30 capsule; Refill: 0  -     lisdexamfetamine (VYVANSE) 40 MG Cap; Take 1 capsule (40 mg total) by mouth once daily.  Dispense: 30 capsule; Refill: 0  -     lisdexamfetamine (VYVANSE) 40 MG Cap; Take 1 capsule (40 mg total) by mouth once daily.  Dispense: 30 capsule; Refill: 0    Controlled. Medication(s) refilled.  reviewed.    The patient location is:  Patient Work  The chief complaint leading to consultation is: noted below  Visit type: Virtual visit with synchronous audio and video  Total time spent with patient: <10 minutes  Each patient to whom he or she provides medical services by telemedicine is:  (1) informed of the relationship between the physician and patient and the respective role of any other health care provider with respect to management of the patient; and (2) notified that he or she may decline to receive medical services by telemedicine and may withdraw from such care at any time.    Follow-up: Follow up in about 6 months (around 5/29/2023) for ADHD.    ______________________________________________________________________    Chief Complaint  Chief Complaint   Patient presents with    ADHD         HPI  Melinda Mccann is a 27 y.o. female with multiple medical diagnoses as listed in the medical history and problem list that presents in a virtual visit to follow up on ADHD. 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 Taking Vyvanse 40 mg for ADHD which is working well for her. Skips doses on days off.       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 No    Breast-feeding No       FAMILY HISTORY:  Family History   Problem Relation Age of Onset    Mitral valve prolapse Mother     Hyperlipidemia Mother     Osteoporosis Mother     Stroke Maternal Aunt         maternal great aunt    Cirrhosis Paternal Uncle     Breast cancer Neg Hx     Colon cancer Neg Hx     Ovarian cancer Neg Hx     Diabetes Neg Hx     Hypertension Neg Hx     Melanoma Neg Hx     Esophageal cancer Neg Hx        ALLERGIES AND MEDICATIONS: updated and reviewed.  Review of patient's allergies indicates:  No Known Allergies  Current Outpatient Medications   Medication Sig Dispense Refill    [START ON 2023] lisdexamfetamine (VYVANSE) 40 MG Cap Take 1 capsule (40 mg total) by mouth once daily. 30 capsule 0    [START ON 2022] lisdexamfetamine (VYVANSE) 40 MG Cap Take 1 capsule (40 mg total) by mouth once daily. 30 capsule 0    lisdexamfetamine (VYVANSE) 40 MG Cap Take 1 capsule (40 mg total) by mouth once daily. 30 capsule 0    multivitamin (THERAGRAN) per tablet Take 1 tablet by mouth once daily.      nystatin (MYCOSTATIN) cream Apply topically 2 (two) times daily.      tretinoin (RETIN-A) 0.05 % cream SMARTSI Topical Every Night 20 g 1     Current Facility-Administered Medications    Medication Dose Route Frequency Provider Last Rate Last Admin    levonorgestrel 17.5 mcg/24 hrs (5 yrs) 19.5 mg IUD 17.5 mcg  17.5 mcg Intrauterine  Marleni Armas DO   17.5 mcg at 07/24/19 1358         ROS  Review of Systems   Constitutional:  Negative for activity change and unexpected weight change.   HENT:  Negative for hearing loss, rhinorrhea and trouble swallowing.    Eyes:  Negative for discharge and visual disturbance.   Respiratory:  Negative for chest tightness and wheezing.    Cardiovascular:  Negative for chest pain and palpitations.   Gastrointestinal:  Negative for blood in stool, constipation, diarrhea and vomiting.   Endocrine: Negative for polydipsia and polyuria.   Genitourinary:  Negative for difficulty urinating, dysuria, hematuria and menstrual problem.   Musculoskeletal:  Negative for arthralgias, joint swelling and neck pain.   Neurological:  Negative for weakness and headaches.   Psychiatric/Behavioral:  Negative for confusion, dysphoric mood and sleep disturbance.          Physical Exam  Physical Exam  Constitutional:       General: She is not in acute distress.  HENT:      Head: Normocephalic and atraumatic.   Neurological:      Mental Status: She is alert. Mental status is at baseline.   Psychiatric:         Mood and Affect: Mood normal.         Behavior: Behavior normal.       *Physical exam limited by virtual visit.    Health Maintenance         Date Due Completion Date    Pneumococcal Vaccines (Age 0-64) (1 - PCV) Never done ---    Pap Smear 05/09/2020 5/9/2017    TETANUS VACCINE 05/15/2028 5/15/2018

## 2023-02-23 ENCOUNTER — OFFICE VISIT (OUTPATIENT)
Dept: URGENT CARE | Facility: CLINIC | Age: 28
End: 2023-02-23
Payer: COMMERCIAL

## 2023-02-23 VITALS
HEIGHT: 67 IN | SYSTOLIC BLOOD PRESSURE: 108 MMHG | TEMPERATURE: 99 F | BODY MASS INDEX: 20.25 KG/M2 | DIASTOLIC BLOOD PRESSURE: 78 MMHG | RESPIRATION RATE: 18 BRPM | HEART RATE: 102 BPM | WEIGHT: 129 LBS | OXYGEN SATURATION: 98 %

## 2023-02-23 DIAGNOSIS — J02.0 STREP THROAT: Primary | ICD-10-CM

## 2023-02-23 LAB
CTP QC/QA: YES
MOLECULAR STREP A: POSITIVE
POC MOLECULAR INFLUENZA A AGN: NEGATIVE
POC MOLECULAR INFLUENZA B AGN: NEGATIVE
SARS-COV-2 AG RESP QL IA.RAPID: NEGATIVE

## 2023-02-23 PROCEDURE — 3074F SYST BP LT 130 MM HG: CPT | Mod: CPTII,S$GLB,, | Performed by: NURSE PRACTITIONER

## 2023-02-23 PROCEDURE — 3008F BODY MASS INDEX DOCD: CPT | Mod: CPTII,S$GLB,, | Performed by: NURSE PRACTITIONER

## 2023-02-23 PROCEDURE — 87651 POCT STREP A MOLECULAR: ICD-10-PCS | Mod: QW,S$GLB,, | Performed by: NURSE PRACTITIONER

## 2023-02-23 PROCEDURE — 3078F PR MOST RECENT DIASTOLIC BLOOD PRESSURE < 80 MM HG: ICD-10-PCS | Mod: CPTII,S$GLB,, | Performed by: NURSE PRACTITIONER

## 2023-02-23 PROCEDURE — 87502 INFLUENZA DNA AMP PROBE: CPT | Mod: QW,S$GLB,, | Performed by: NURSE PRACTITIONER

## 2023-02-23 PROCEDURE — 87651 STREP A DNA AMP PROBE: CPT | Mod: QW,S$GLB,, | Performed by: NURSE PRACTITIONER

## 2023-02-23 PROCEDURE — 3074F PR MOST RECENT SYSTOLIC BLOOD PRESSURE < 130 MM HG: ICD-10-PCS | Mod: CPTII,S$GLB,, | Performed by: NURSE PRACTITIONER

## 2023-02-23 PROCEDURE — 87811 SARS CORONAVIRUS 2 ANTIGEN POCT, MANUAL READ: ICD-10-PCS | Mod: QW,S$GLB,, | Performed by: NURSE PRACTITIONER

## 2023-02-23 PROCEDURE — 99203 PR OFFICE/OUTPT VISIT, NEW, LEVL III, 30-44 MIN: ICD-10-PCS | Mod: S$GLB,,, | Performed by: NURSE PRACTITIONER

## 2023-02-23 PROCEDURE — 3078F DIAST BP <80 MM HG: CPT | Mod: CPTII,S$GLB,, | Performed by: NURSE PRACTITIONER

## 2023-02-23 PROCEDURE — 3008F PR BODY MASS INDEX (BMI) DOCUMENTED: ICD-10-PCS | Mod: CPTII,S$GLB,, | Performed by: NURSE PRACTITIONER

## 2023-02-23 PROCEDURE — 1160F RVW MEDS BY RX/DR IN RCRD: CPT | Mod: CPTII,S$GLB,, | Performed by: NURSE PRACTITIONER

## 2023-02-23 PROCEDURE — 87502 POCT INFLUENZA A/B MOLECULAR: ICD-10-PCS | Mod: QW,S$GLB,, | Performed by: NURSE PRACTITIONER

## 2023-02-23 PROCEDURE — 1159F MED LIST DOCD IN RCRD: CPT | Mod: CPTII,S$GLB,, | Performed by: NURSE PRACTITIONER

## 2023-02-23 PROCEDURE — 1160F PR REVIEW ALL MEDS BY PRESCRIBER/CLIN PHARMACIST DOCUMENTED: ICD-10-PCS | Mod: CPTII,S$GLB,, | Performed by: NURSE PRACTITIONER

## 2023-02-23 PROCEDURE — 99203 OFFICE O/P NEW LOW 30 MIN: CPT | Mod: S$GLB,,, | Performed by: NURSE PRACTITIONER

## 2023-02-23 PROCEDURE — 1159F PR MEDICATION LIST DOCUMENTED IN MEDICAL RECORD: ICD-10-PCS | Mod: CPTII,S$GLB,, | Performed by: NURSE PRACTITIONER

## 2023-02-23 PROCEDURE — 87811 SARS-COV-2 COVID19 W/OPTIC: CPT | Mod: QW,S$GLB,, | Performed by: NURSE PRACTITIONER

## 2023-02-23 RX ORDER — PREDNISONE 20 MG/1
20 TABLET ORAL DAILY
Qty: 5 TABLET | Refills: 0 | Status: SHIPPED | OUTPATIENT
Start: 2023-02-23 | End: 2023-02-28

## 2023-02-23 RX ORDER — AMOXICILLIN 500 MG/1
500 CAPSULE ORAL EVERY 12 HOURS
Qty: 20 CAPSULE | Refills: 0 | Status: SHIPPED | OUTPATIENT
Start: 2023-02-23 | End: 2023-03-05

## 2023-02-23 NOTE — PATIENT INSTRUCTIONS
- Follow up with your PCP or specialty clinic as directed in the next 1-2 weeks if not improved or as needed.  You can call [phone] to schedule an appointment with the appropriate provider.    - Go to the ER or seek medical attention immediately if you develop new or worsening symptoms.    - You must understand that you have received an Urgent Care treatment only and that you may be released before all of your medical problems are known or treated.   - You, the patient, will arrange for follow up care as instructed.   - If your condition worsens or fails to improve we recommend that you receive another evaluation at the ER immediately or contact your PCP to discuss your concerns or return here.

## 2023-02-23 NOTE — PROGRESS NOTES
"Subjective:       Patient ID: Melinda Mccann is a 27 y.o. female.    Vitals:  height is 5' 7" (1.702 m) and weight is 58.5 kg (129 lb). Her tympanic temperature is 98.7 °F (37.1 °C). Her blood pressure is 108/78 and her pulse is 102. Her respiration is 18 and oxygen saturation is 98%.     Chief Complaint: Fever    27-year-old female presents to clinic for evaluation of sore throat with body aches, chills, swollen glands and subjective fever that started yesterday.  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  She reports taking DayQuil with mild relief.  She is awake and alert, answers questions appropriately, no acute distress noted on today's visit.    Sore Throat   This is a new problem. The current episode started yesterday. The problem has been gradually worsening. Neither side of throat is experiencing more pain than the other. There has been no fever. The pain is at a severity of 5/10. The pain is mild. Associated symptoms include congestion, a hoarse voice and swollen glands. Pertinent negatives include no abdominal pain or vomiting. Associated symptoms comments: Body aches     . She has had no exposure to strep or mono. Treatments tried: day quill. The treatment provided mild relief.     Constitution: Positive for fever. Negative for activity change, appetite change, chills, sweating and fatigue.   HENT:  Positive for congestion and sore throat.    Cardiovascular:  Negative for chest pain.   Gastrointestinal:  Negative for abdominal pain, nausea and vomiting.   Neurological:  Negative for dizziness.     Objective:      Physical Exam   Constitutional: She is oriented to person, place, and time. She appears well-developed.  Non-toxic appearance. She does not appear ill. No distress.   HENT:   Head: Normocephalic and atraumatic. Head is without abrasion, without contusion and without laceration.   Ears:   Right Ear: Tympanic membrane and external ear " normal.   Left Ear: Tympanic membrane and external ear normal.   Nose: Nose normal.   Mouth/Throat: Mucous membranes are normal. Mucous membranes are moist. Posterior oropharyngeal erythema present. No oropharyngeal exudate. Oropharynx is clear.   Eyes: Conjunctivae, EOM and lids are normal. Right eye exhibits no discharge. Left eye exhibits no discharge.   Neck: Trachea normal and phonation normal.   Cardiovascular: Normal rate and normal heart sounds.   Pulmonary/Chest: Effort normal and breath sounds normal. No stridor. No respiratory distress. She has no wheezes.   Abdominal: Normal appearance.   Musculoskeletal: Normal range of motion.         General: Normal range of motion.   Neurological: She is alert and oriented to person, place, and time.   Skin: Skin is warm, dry, intact, not diaphoretic and not pale. No abrasion, No burn and No ecchymosis   Psychiatric: Her speech is normal and behavior is normal. Mood, judgment and thought content normal.   Nursing note and vitals reviewed.      Results for orders placed or performed in visit on 02/23/23   SARS Coronavirus 2 Antigen, POCT Manual Read   Result Value Ref Range    SARS Coronavirus 2 Antigen Negative Negative     Acceptable Yes    POCT Strep A, Molecular   Result Value Ref Range    Molecular Strep A, POC Positive (A) Negative     Acceptable Yes    POCT Influenza A/B MOLECULAR   Result Value Ref Range    POC Molecular Influenza A Ag Negative Negative, Not Reported    POC Molecular Influenza B Ag Negative Negative, Not Reported     Acceptable Yes        Assessment:       1. Strep throat          Plan:         Strep throat  -     SARS Coronavirus 2 Antigen, POCT Manual Read  -     POCT Strep A, Molecular  -     POCT Influenza A/B MOLECULAR  -     amoxicillin (AMOXIL) 500 MG capsule; Take 1 capsule (500 mg total) by mouth every 12 (twelve) hours. for 10 days  Dispense: 20 capsule; Refill: 0  -     predniSONE  (DELTASONE) 20 MG tablet; Take 1 tablet (20 mg total) by mouth once daily. for 5 days  Dispense: 5 tablet; Refill: 0  -     (Magic mouthwash) 1:1:1 diphenhydrAMINE(Benadryl) 12.5mg/5ml liq, aluminum & magnesium hydroxide-simethicone (Maalox), LIDOcaine viscous 2%; Swish and spit 10 mLs every 4 (four) hours as needed (sore throat). for sore throat  Dispense: 360 mL; Refill: 0      Patient Instructions   - Follow up with your PCP or specialty clinic as directed in the next 1-2 weeks if not improved or as needed.  You can call [phone] to schedule an appointment with the appropriate provider.    - Go to the ER or seek medical attention immediately if you develop new or worsening symptoms.    - You must understand that you have received an Urgent Care treatment only and that you may be released before all of your medical problems are known or treated.   - You, the patient, will arrange for follow up care as instructed.   - If your condition worsens or fails to improve we recommend that you receive another evaluation at the ER immediately or contact your PCP to discuss your concerns or return here.

## 2023-03-07 DIAGNOSIS — F90.0 ATTENTION DEFICIT HYPERACTIVITY DISORDER (ADHD), PREDOMINANTLY INATTENTIVE TYPE: ICD-10-CM

## 2023-03-07 RX ORDER — LISDEXAMFETAMINE DIMESYLATE 40 MG/1
40 CAPSULE ORAL DAILY
Qty: 30 CAPSULE | Refills: 0 | Status: SHIPPED | OUTPATIENT
Start: 2023-03-07 | End: 2023-04-10 | Stop reason: SDUPTHER

## 2023-03-07 NOTE — TELEPHONE ENCOUNTER
No new care gaps identified.  [facility] Embedded Care Gaps. Reference number: [id number]. 3/07/2023   7:15:32 AM CST

## 2023-03-08 ENCOUNTER — TELEPHONE (OUTPATIENT)
Dept: FAMILY MEDICINE | Facility: CLINIC | Age: 28
End: 2023-03-08
Payer: COMMERCIAL

## 2023-03-08 NOTE — TELEPHONE ENCOUNTER
----- Message from Sandra Sin sent at 3/8/2023  9:31 AM CST -----  [phone]  .Type:  Sooner Appointment Request    Patient is requesting a sooner appointment.  Patient declined first available appointment listed as well as another facility and provider .  Patient will not accept being placed on the waitlist and is requesting a message be sent to doctor.    Name of Caller:pt     When is the first available appointment?call back     Symptoms: sore throat     Would the patient rather a call back or a response via [facility]? Call back     Best Call Back Number: [phone]      Additional Information:

## 2023-03-08 NOTE — TELEPHONE ENCOUNTER
Spoke with patient states she has been having the following    Fever  Chills  Cough  Congestion  Body aches    X one day and wants something called in

## 2023-03-08 NOTE — TELEPHONE ENCOUNTER
----- Message from Maryann Hernandez sent at 3/8/2023  9:44 AM CST -----  Regarding: Return call  .Type:  Patient Returning Call    Who Called: self     Who Left Message for Patient: Misty Jha MA     Does the patient know what this is regarding?:previous message     Would the patient rather a call back or a response via [facility]?     Best Call Back Number: .[phone]      Additional Information:

## 2023-05-10 DIAGNOSIS — F90.0 ATTENTION DEFICIT HYPERACTIVITY DISORDER (ADHD), PREDOMINANTLY INATTENTIVE TYPE: ICD-10-CM

## 2023-05-10 RX ORDER — LISDEXAMFETAMINE DIMESYLATE 40 MG/1
40 CAPSULE ORAL DAILY
Qty: 30 CAPSULE | Refills: 0 | Status: SHIPPED | OUTPATIENT
Start: 2023-05-10 | End: 2023-06-09 | Stop reason: SDUPTHER

## 2023-05-10 NOTE — TELEPHONE ENCOUNTER
No care due was identified.  [facility] Embedded Care Due Messages. Reference number: [id number].   5/10/2023 7:38:36 AM CDT

## 2023-06-09 DIAGNOSIS — F90.0 ATTENTION DEFICIT HYPERACTIVITY DISORDER (ADHD), PREDOMINANTLY INATTENTIVE TYPE: ICD-10-CM

## 2023-06-09 RX ORDER — LISDEXAMFETAMINE DIMESYLATE 40 MG/1
40 CAPSULE ORAL DAILY
Qty: 30 CAPSULE | Refills: 0 | Status: SHIPPED | OUTPATIENT
Start: 2023-06-09 | End: 2023-11-17 | Stop reason: SDUPTHER

## 2023-06-09 NOTE — TELEPHONE ENCOUNTER
No care due was identified.  Health [facility] Embedded Care Due Messages. Reference number: [id number].   6/09/2023 9:26:37 AM CDT

## 2023-06-12 ENCOUNTER — OFFICE VISIT (OUTPATIENT)
Dept: FAMILY MEDICINE | Facility: CLINIC | Age: 28
End: 2023-06-12
Payer: COMMERCIAL

## 2023-06-12 VITALS
BODY MASS INDEX: 19.96 KG/M2 | HEIGHT: 67 IN | WEIGHT: 127.19 LBS | TEMPERATURE: 99 F | OXYGEN SATURATION: 97 % | SYSTOLIC BLOOD PRESSURE: 106 MMHG | HEART RATE: 76 BPM | DIASTOLIC BLOOD PRESSURE: 84 MMHG

## 2023-06-12 DIAGNOSIS — F90.0 ATTENTION DEFICIT HYPERACTIVITY DISORDER (ADHD), PREDOMINANTLY INATTENTIVE TYPE: ICD-10-CM

## 2023-06-12 DIAGNOSIS — Z00.00 ANNUAL PHYSICAL EXAM: Primary | ICD-10-CM

## 2023-06-12 PROCEDURE — 99395 PR PREVENTIVE VISIT,EST,18-39: ICD-10-PCS | Mod: S$GLB,,, | Performed by: FAMILY MEDICINE

## 2023-06-12 PROCEDURE — 99395 PREV VISIT EST AGE 18-39: CPT | Mod: S$GLB,,, | Performed by: FAMILY MEDICINE

## 2023-06-12 PROCEDURE — 1159F MED LIST DOCD IN RCRD: CPT | Mod: CPTII,S$GLB,, | Performed by: FAMILY MEDICINE

## 2023-06-12 PROCEDURE — 3079F DIAST BP 80-89 MM HG: CPT | Mod: CPTII,S$GLB,, | Performed by: FAMILY MEDICINE

## 2023-06-12 PROCEDURE — 1160F RVW MEDS BY RX/DR IN RCRD: CPT | Mod: CPTII,S$GLB,, | Performed by: FAMILY MEDICINE

## 2023-06-12 PROCEDURE — 1160F PR REVIEW ALL MEDS BY PRESCRIBER/CLIN PHARMACIST DOCUMENTED: ICD-10-PCS | Mod: CPTII,S$GLB,, | Performed by: FAMILY MEDICINE

## 2023-06-12 PROCEDURE — 3079F PR MOST RECENT DIASTOLIC BLOOD PRESSURE 80-89 MM HG: ICD-10-PCS | Mod: CPTII,S$GLB,, | Performed by: FAMILY MEDICINE

## 2023-06-12 PROCEDURE — 3074F SYST BP LT 130 MM HG: CPT | Mod: CPTII,S$GLB,, | Performed by: FAMILY MEDICINE

## 2023-06-12 PROCEDURE — 3074F PR MOST RECENT SYSTOLIC BLOOD PRESSURE < 130 MM HG: ICD-10-PCS | Mod: CPTII,S$GLB,, | Performed by: FAMILY MEDICINE

## 2023-06-12 PROCEDURE — 3008F BODY MASS INDEX DOCD: CPT | Mod: CPTII,S$GLB,, | Performed by: FAMILY MEDICINE

## 2023-06-12 PROCEDURE — 1159F PR MEDICATION LIST DOCUMENTED IN MEDICAL RECORD: ICD-10-PCS | Mod: CPTII,S$GLB,, | Performed by: FAMILY MEDICINE

## 2023-06-12 PROCEDURE — 99999 PR PBB SHADOW E&M-EST. PATIENT-LVL IV: CPT | Mod: PBBFAC,,, | Performed by: FAMILY MEDICINE

## 2023-06-12 PROCEDURE — 99999 PR PBB SHADOW E&M-EST. PATIENT-LVL IV: ICD-10-PCS | Mod: PBBFAC,,, | Performed by: FAMILY MEDICINE

## 2023-06-12 PROCEDURE — 3008F PR BODY MASS INDEX (BMI) DOCUMENTED: ICD-10-PCS | Mod: CPTII,S$GLB,, | Performed by: FAMILY MEDICINE

## 2023-06-12 RX ORDER — LISDEXAMFETAMINE DIMESYLATE 40 MG/1
40 CAPSULE ORAL DAILY
Qty: 30 CAPSULE | Refills: 0 | Status: SHIPPED | OUTPATIENT
Start: 2023-07-07 | End: 2023-08-11 | Stop reason: SDUPTHER

## 2023-06-12 RX ORDER — FLUCONAZOLE 150 MG/1
150 TABLET ORAL
COMMUNITY
Start: 2023-05-04

## 2023-06-12 RX ORDER — AMOXICILLIN AND CLAVULANATE POTASSIUM 875; 125 MG/1; MG/1
1 TABLET, FILM COATED ORAL 2 TIMES DAILY
COMMUNITY
Start: 2023-04-29

## 2023-06-12 RX ORDER — CHLORHEXIDINE GLUCONATE ORAL RINSE 1.2 MG/ML
15 SOLUTION DENTAL 2 TIMES DAILY
COMMUNITY
Start: 2023-03-08

## 2023-06-12 RX ORDER — LISDEXAMFETAMINE DIMESYLATE 40 MG/1
40 CAPSULE ORAL DAILY
Qty: 30 CAPSULE | Refills: 0 | Status: SHIPPED | OUTPATIENT
Start: 2023-08-06 | End: 2023-08-11 | Stop reason: SDUPTHER

## 2023-06-12 NOTE — PROGRESS NOTES
Assessment & Plan  Annual physical exam  -     CBC Auto Differential; Future; Expected date: 06/12/2023  -     Comprehensive Metabolic Panel; Future; Expected date: 06/12/2023  -     Hemoglobin A1C; Future; Expected date: 06/12/2023  -     Lipid Panel; Future; Expected date: 06/12/2023    Fasting labs to be scheduled.    Attention deficit hyperactivity disorder (ADHD), predominantly inattentive type  -     lisdexamfetamine (VYVANSE) 40 MG Cap; Take 1 capsule (40 mg total) by mouth once daily.  Dispense: 30 capsule; Refill: 0  -     lisdexamfetamine (VYVANSE) 40 MG Cap; Take 1 capsule (40 mg total) by mouth once daily.  Dispense: 30 capsule; Refill: 0    Controlled. Medication(s) refilled.  reviewed. UTD on UDS.      Health maintenance reviewed:  -Pap Smear done last year in [place], [place] - will obtain record  -PNA vaccine not indicated    Follow-up: Follow up in about 6 months (around 12/12/2023).  ______________________________________________________________________    Chief Complaint  Chief Complaint   Patient presents with    Annual Exam       HPI  Melinda Mccann is a 28 y.o. female with medical diagnoses as listed in the medical history and problem list that presents to the office for an annual exam.     Health Maintenance         Date Due Completion Date    Pneumococcal Vaccines (Age 0-64) (1 - PCV) Never done ---    Pap Smear 05/09/2020 5/9/2017    TETANUS VACCINE 05/15/2028 5/15/2018              PAST MEDICAL HISTORY:  Past Medical History:   Diagnosis Date    Accessory navicular bone of foot     right    ADD (attention deficit disorder)     Cancer     Dysplastic nevus     Melanoma     Still's murmur        PAST SURGICAL HISTORY:  Past Surgical History:   Procedure Laterality Date    COLONOSCOPY N/A 5/17/2016    Procedure: COLONOSCOPY;  Surgeon: ROSY Ferguson MD;  Location: [address];  Service: Endoscopy;  Laterality: N/A;    WISDOM TOOTH EXTRACTION         SOCIAL HISTORY:  Social  History     Socioeconomic History    Marital status: Single   Occupational History    Occupation: [employer]   Tobacco Use    Smoking status: Never    Smokeless tobacco: Never   Substance and Sexual Activity    Alcohol use: Yes     Alcohol/week: 0.0 standard drinks    Drug use: No    Sexual activity: Yes     Partners: Male     Birth control/protection: Condom, OCP   Other Topics Concern    Are you pregnant or think you may be? No    Breast-feeding No       FAMILY HISTORY:  Family History   Problem Relation Age of Onset    Mitral valve prolapse Mother     Hyperlipidemia Mother     Osteoporosis Mother     Stroke Maternal Aunt         maternal great aunt    Cirrhosis Paternal Uncle     Breast cancer Neg Hx     Colon cancer Neg Hx     Ovarian cancer Neg Hx     Diabetes Neg Hx     Hypertension Neg Hx     Melanoma Neg Hx     Esophageal cancer Neg Hx        ALLERGIES AND MEDICATIONS: updated and reviewed.  Review of patient's allergies indicates:  No Known Allergies  Current Outpatient Medications   Medication Sig Dispense Refill    (Magic mouthwash) 1:1:1 diphenhydrAMINE(Benadryl) 12.5mg/5ml liq, aluminum & magnesium hydroxide-simethicone (Maalox), LIDOcaine viscous 2% Swish and spit 10 mLs every 4 (four) hours as needed (sore throat). for sore throat 360 mL 0    amoxicillin-clavulanate 875-125mg (AUGMENTIN) 875-125 mg per tablet Take 1 tablet by mouth 2 (two) times daily.      chlorhexidine (PERIDEX) 0.12 % solution 15 mLs 2 (two) times daily.      fluconazole (DIFLUCAN) 150 MG Tab Take 150 mg by mouth.      lisdexamfetamine (VYVANSE) 40 MG Cap Take 1 capsule (40 mg total) by mouth once daily. 30 capsule 0    multivitamin (THERAGRAN) per tablet Take 1 tablet by mouth once daily.      nystatin (MYCOSTATIN) cream Apply topically 2 (two) times daily.      tretinoin (RETIN-A) 0.05 % cream SMARTSI Topical Every Night 20 g 1    [START ON 2023] lisdexamfetamine (VYVANSE) 40 MG Cap Take 1 capsule (40 mg total) by  "mouth once daily. 30 capsule 0    [START ON 8/6/2023] lisdexamfetamine (VYVANSE) 40 MG Cap Take 1 capsule (40 mg total) by mouth once daily. 30 capsule 0     Current Facility-Administered Medications   Medication Dose Route Frequency Provider Last Rate Last Admin    levonorgestrel 17.5 mcg/24 hrs (5 yrs) 19.5 mg IUD 17.5 mcg  17.5 mcg Intrauterine  Marleni Armas, DO   17.5 mcg at 07/24/19 1358         ROS  Review of Systems   Constitutional:  Negative for activity change, fever and unexpected weight change.   HENT:  Negative for congestion and sore throat.    Eyes:  Negative for photophobia and visual disturbance.   Respiratory:  Negative for cough and shortness of breath.    Cardiovascular:  Negative for chest pain and leg swelling.   Gastrointestinal:  Negative for abdominal pain, constipation, diarrhea, nausea and vomiting.   Endocrine: Negative for polydipsia, polyphagia and polyuria.   Genitourinary:  Negative for dysuria and urgency.   Musculoskeletal:  Negative for arthralgias and gait problem.   Skin:  Negative for color change.   Neurological:  Negative for dizziness, weakness and headaches.   Psychiatric/Behavioral:  Negative for dysphoric mood and sleep disturbance. The patient is not nervous/anxious.          Physical Exam  Vitals:    06/12/23 1508   BP: 106/84   BP Location: Right arm   Patient Position: Sitting   BP Method: Medium (Manual)   Pulse: 76   Temp: 98.8 °F (37.1 °C)   TempSrc: Oral   SpO2: 97%   Weight: 57.7 kg (127 lb 3.3 oz)   Height: 5' 7" (1.702 m)    Body mass index is 19.92 kg/m².  Weight: 57.7 kg (127 lb 3.3 oz)   Height: 5' 7" (170.2 cm)   Physical Exam  Constitutional:       General: She is not in acute distress.     Appearance: Normal appearance.   HENT:      Head: Normocephalic and atraumatic.   Neck:      Thyroid: No thyromegaly.   Cardiovascular:      Rate and Rhythm: Normal rate and regular rhythm.      Pulses: Normal pulses.      Heart sounds: Normal heart sounds. "   Pulmonary:      Effort: Pulmonary effort is normal. No respiratory distress.      Breath sounds: Normal breath sounds.   Musculoskeletal:      Cervical back: Neck supple.      Right lower leg: No edema.      Left lower leg: No edema.   Lymphadenopathy:      Cervical: No cervical adenopathy.   Skin:     General: Skin is warm and dry.      Findings: No rash.   Neurological:      General: No focal deficit present.      Mental Status: She is alert and oriented to person, place, and time.   Psychiatric:         Mood and Affect: Mood normal.         Behavior: Behavior normal.         Thought Content: Thought content normal.

## 2023-06-13 ENCOUNTER — PATIENT OUTREACH (OUTPATIENT)
Dept: ADMINISTRATIVE | Facility: HOSPITAL | Age: 28
End: 2023-06-13
Payer: COMMERCIAL

## 2023-06-13 NOTE — PROGRESS NOTES
Overdue Pap Smear - Left message for patient to call our office. More information needed on her Ob/Gyn.

## 2023-06-15 ENCOUNTER — LAB VISIT (OUTPATIENT)
Dept: LAB | Facility: HOSPITAL | Age: 28
End: 2023-06-15
Attending: FAMILY MEDICINE
Payer: COMMERCIAL

## 2023-06-15 DIAGNOSIS — Z00.00 ANNUAL PHYSICAL EXAM: ICD-10-CM

## 2023-06-15 LAB
ALBUMIN SERPL BCP-MCNC: 4.1 G/DL (ref 3.5–5.2)
ALP SERPL-CCNC: 63 U/L (ref 55–135)
ALT SERPL W/O P-5'-P-CCNC: 13 U/L (ref 10–44)
ANION GAP SERPL CALC-SCNC: 9 MMOL/L (ref 8–16)
AST SERPL-CCNC: 17 U/L (ref 10–40)
BASOPHILS # BLD AUTO: 0.03 K/UL (ref 0–0.2)
BASOPHILS NFR BLD: 0.6 % (ref 0–1.9)
BILIRUB SERPL-MCNC: 2.2 MG/DL (ref 0.1–1)
BUN SERPL-MCNC: 12 MG/DL (ref 6–20)
CALCIUM SERPL-MCNC: 9.4 MG/DL (ref 8.7–10.5)
CHLORIDE SERPL-SCNC: 106 MMOL/L (ref 95–110)
CHOLEST SERPL-MCNC: 169 MG/DL (ref 120–199)
CHOLEST/HDLC SERPL: 3 {RATIO} (ref 2–5)
CO2 SERPL-SCNC: 24 MMOL/L (ref 23–29)
CREAT SERPL-MCNC: 0.7 MG/DL (ref 0.5–1.4)
DIFFERENTIAL METHOD: NORMAL
EOSINOPHIL # BLD AUTO: 0.2 K/UL (ref 0–0.5)
EOSINOPHIL NFR BLD: 3.6 % (ref 0–8)
ERYTHROCYTE [DISTWIDTH] IN BLOOD BY AUTOMATED COUNT: 11.9 % (ref 11.5–14.5)
EST. GFR  (NO RACE VARIABLE): >60 ML/MIN/1.73 M^2
ESTIMATED AVG GLUCOSE: 100 MG/DL (ref 68–131)
GLUCOSE SERPL-MCNC: 86 MG/DL (ref 70–110)
HBA1C MFR BLD: 5.1 % (ref 4–5.6)
HCT VFR BLD AUTO: 41.5 % (ref 37–48.5)
HDLC SERPL-MCNC: 56 MG/DL (ref 40–75)
HDLC SERPL: 33.1 % (ref 20–50)
HGB BLD-MCNC: 13.6 G/DL (ref 12–16)
IMM GRANULOCYTES # BLD AUTO: 0 K/UL (ref 0–0.04)
IMM GRANULOCYTES NFR BLD AUTO: 0 % (ref 0–0.5)
LDLC SERPL CALC-MCNC: 101.4 MG/DL (ref 63–159)
LYMPHOCYTES # BLD AUTO: 1.9 K/UL (ref 1–4.8)
LYMPHOCYTES NFR BLD: 41.3 % (ref 18–48)
MCH RBC QN AUTO: 30 PG (ref 27–31)
MCHC RBC AUTO-ENTMCNC: 32.8 G/DL (ref 32–36)
MCV RBC AUTO: 91 FL (ref 82–98)
MONOCYTES # BLD AUTO: 0.3 K/UL (ref 0.3–1)
MONOCYTES NFR BLD: 6.4 % (ref 4–15)
NEUTROPHILS # BLD AUTO: 2.3 K/UL (ref 1.8–7.7)
NEUTROPHILS NFR BLD: 48.1 % (ref 38–73)
NONHDLC SERPL-MCNC: 113 MG/DL
NRBC BLD-RTO: 0 /100 WBC
PLATELET # BLD AUTO: 190 K/UL (ref 150–450)
PMV BLD AUTO: 11 FL (ref 9.2–12.9)
POTASSIUM SERPL-SCNC: 3.9 MMOL/L (ref 3.5–5.1)
PROT SERPL-MCNC: 6.8 G/DL (ref 6–8.4)
RBC # BLD AUTO: 4.54 M/UL (ref 4–5.4)
SODIUM SERPL-SCNC: 139 MMOL/L (ref 136–145)
TRIGL SERPL-MCNC: 58 MG/DL (ref 30–150)
WBC # BLD AUTO: 4.7 K/UL (ref 3.9–12.7)

## 2023-06-15 PROCEDURE — 36415 COLL VENOUS BLD VENIPUNCTURE: CPT | Mod: PO | Performed by: FAMILY MEDICINE

## 2023-06-15 PROCEDURE — 85025 COMPLETE CBC W/AUTO DIFF WBC: CPT | Performed by: FAMILY MEDICINE

## 2023-06-15 PROCEDURE — 83036 HEMOGLOBIN GLYCOSYLATED A1C: CPT | Performed by: FAMILY MEDICINE

## 2023-06-15 PROCEDURE — 80053 COMPREHEN METABOLIC PANEL: CPT | Performed by: FAMILY MEDICINE

## 2023-06-15 PROCEDURE — 80061 LIPID PANEL: CPT | Performed by: FAMILY MEDICINE

## 2023-08-11 ENCOUNTER — PATIENT MESSAGE (OUTPATIENT)
Dept: FAMILY MEDICINE | Facility: CLINIC | Age: 28
End: 2023-08-11
Payer: COMMERCIAL

## 2023-08-11 DIAGNOSIS — F90.0 ATTENTION DEFICIT HYPERACTIVITY DISORDER (ADHD), PREDOMINANTLY INATTENTIVE TYPE: ICD-10-CM

## 2023-08-11 RX ORDER — LISDEXAMFETAMINE DIMESYLATE 40 MG/1
40 CAPSULE ORAL DAILY
Qty: 30 CAPSULE | Refills: 0 | Status: SHIPPED | OUTPATIENT
Start: 2023-08-11 | End: 2023-09-12 | Stop reason: SDUPTHER

## 2023-08-11 NOTE — TELEPHONE ENCOUNTER
Pt informed that [facility] has Vyvanse 40mg in stock. Pt request that Rx be sent to [facility].

## 2023-08-11 NOTE — TELEPHONE ENCOUNTER
No care due was identified.  [facility] Embedded Care Due Messages. Reference number: [id number].   8/11/2023 1:12:58 PM CDT

## 2023-09-12 DIAGNOSIS — F90.0 ATTENTION DEFICIT HYPERACTIVITY DISORDER (ADHD), PREDOMINANTLY INATTENTIVE TYPE: ICD-10-CM

## 2023-09-12 RX ORDER — LISDEXAMFETAMINE DIMESYLATE 40 MG/1
40 CAPSULE ORAL DAILY
Qty: 30 CAPSULE | Refills: 0 | Status: SHIPPED | OUTPATIENT
Start: 2023-09-12 | End: 2023-10-12 | Stop reason: SDUPTHER

## 2023-09-12 NOTE — TELEPHONE ENCOUNTER
No care due was identified.  Health [facility] Embedded Care Due Messages. Reference number: [id number].   9/12/2023 7:46:52 AM CDT

## 2023-10-12 DIAGNOSIS — F90.0 ATTENTION DEFICIT HYPERACTIVITY DISORDER (ADHD), PREDOMINANTLY INATTENTIVE TYPE: ICD-10-CM

## 2023-10-12 RX ORDER — LISDEXAMFETAMINE DIMESYLATE 40 MG/1
40 CAPSULE ORAL DAILY
Qty: 30 CAPSULE | Refills: 0 | Status: SHIPPED | OUTPATIENT
Start: 2023-10-12 | End: 2023-11-12 | Stop reason: SDUPTHER

## 2023-10-12 NOTE — TELEPHONE ENCOUNTER
No care due was identified.  [facility] Embedded Care Due Messages. Reference number: [id number].   10/12/2023 9:01:06 AM CDT

## 2023-11-12 DIAGNOSIS — F90.0 ATTENTION DEFICIT HYPERACTIVITY DISORDER (ADHD), PREDOMINANTLY INATTENTIVE TYPE: ICD-10-CM

## 2023-11-13 ENCOUNTER — OFFICE VISIT (OUTPATIENT)
Dept: OBSTETRICS AND GYNECOLOGY | Facility: CLINIC | Age: 28
End: 2023-11-13
Payer: COMMERCIAL

## 2023-11-13 VITALS
WEIGHT: 132.94 LBS | BODY MASS INDEX: 20.82 KG/M2 | DIASTOLIC BLOOD PRESSURE: 67 MMHG | SYSTOLIC BLOOD PRESSURE: 122 MMHG

## 2023-11-13 DIAGNOSIS — Z01.419 ENCOUNTER FOR WELL WOMAN EXAM WITH ROUTINE GYNECOLOGICAL EXAM: Primary | ICD-10-CM

## 2023-11-13 DIAGNOSIS — Z12.4 CERVICAL CANCER SCREENING: ICD-10-CM

## 2023-11-13 PROCEDURE — 99385 PREV VISIT NEW AGE 18-39: CPT | Mod: S$GLB,,, | Performed by: OBSTETRICS & GYNECOLOGY

## 2023-11-13 PROCEDURE — 3074F SYST BP LT 130 MM HG: CPT | Mod: CPTII,S$GLB,, | Performed by: OBSTETRICS & GYNECOLOGY

## 2023-11-13 PROCEDURE — 3078F PR MOST RECENT DIASTOLIC BLOOD PRESSURE < 80 MM HG: ICD-10-PCS | Mod: CPTII,S$GLB,, | Performed by: OBSTETRICS & GYNECOLOGY

## 2023-11-13 PROCEDURE — 3044F HG A1C LEVEL LT 7.0%: CPT | Mod: CPTII,S$GLB,, | Performed by: OBSTETRICS & GYNECOLOGY

## 2023-11-13 PROCEDURE — 1160F RVW MEDS BY RX/DR IN RCRD: CPT | Mod: CPTII,S$GLB,, | Performed by: OBSTETRICS & GYNECOLOGY

## 2023-11-13 PROCEDURE — 3008F BODY MASS INDEX DOCD: CPT | Mod: CPTII,S$GLB,, | Performed by: OBSTETRICS & GYNECOLOGY

## 2023-11-13 PROCEDURE — 99999 PR PBB SHADOW E&M-EST. PATIENT-LVL III: ICD-10-PCS | Mod: PBBFAC,,, | Performed by: OBSTETRICS & GYNECOLOGY

## 2023-11-13 PROCEDURE — 99999 PR PBB SHADOW E&M-EST. PATIENT-LVL III: CPT | Mod: PBBFAC,,, | Performed by: OBSTETRICS & GYNECOLOGY

## 2023-11-13 PROCEDURE — 1160F PR REVIEW ALL MEDS BY PRESCRIBER/CLIN PHARMACIST DOCUMENTED: ICD-10-PCS | Mod: CPTII,S$GLB,, | Performed by: OBSTETRICS & GYNECOLOGY

## 2023-11-13 PROCEDURE — 3008F PR BODY MASS INDEX (BMI) DOCUMENTED: ICD-10-PCS | Mod: CPTII,S$GLB,, | Performed by: OBSTETRICS & GYNECOLOGY

## 2023-11-13 PROCEDURE — 3074F PR MOST RECENT SYSTOLIC BLOOD PRESSURE < 130 MM HG: ICD-10-PCS | Mod: CPTII,S$GLB,, | Performed by: OBSTETRICS & GYNECOLOGY

## 2023-11-13 PROCEDURE — 1159F MED LIST DOCD IN RCRD: CPT | Mod: CPTII,S$GLB,, | Performed by: OBSTETRICS & GYNECOLOGY

## 2023-11-13 PROCEDURE — 3078F DIAST BP <80 MM HG: CPT | Mod: CPTII,S$GLB,, | Performed by: OBSTETRICS & GYNECOLOGY

## 2023-11-13 PROCEDURE — 99385 PR PREVENTIVE VISIT,NEW,18-39: ICD-10-PCS | Mod: S$GLB,,, | Performed by: OBSTETRICS & GYNECOLOGY

## 2023-11-13 PROCEDURE — 1159F PR MEDICATION LIST DOCUMENTED IN MEDICAL RECORD: ICD-10-PCS | Mod: CPTII,S$GLB,, | Performed by: OBSTETRICS & GYNECOLOGY

## 2023-11-13 PROCEDURE — 3044F PR MOST RECENT HEMOGLOBIN A1C LEVEL <7.0%: ICD-10-PCS | Mod: CPTII,S$GLB,, | Performed by: OBSTETRICS & GYNECOLOGY

## 2023-11-13 RX ORDER — LISDEXAMFETAMINE DIMESYLATE 40 MG/1
40 CAPSULE ORAL DAILY
Qty: 30 CAPSULE | Refills: 0 | Status: SHIPPED | OUTPATIENT
Start: 2023-11-13 | End: 2023-11-17 | Stop reason: SDUPTHER

## 2023-11-13 NOTE — PROGRESS NOTES
Chief Complaint: Annual exam/New Patient    Chief Complaint   Patient presents with    Well Woman       HPI:   28 y.o.  here today as a new patient to establish care and for annual exam. PMH includes ADHD. Reports her cycles are monthly lasting 4-8 days, only using pantiliners. Patient denies abnormal breast symptoms, denies FH of breast, uterine, ovarian, or colon cancer. Patient denies vaginal discharge, itching, irritation, odor, abdominal pain, urinary complaints, or change in bowel habits. She is currently sexually active, monogamous male partner. Currently using Mirena IUD for birth control in place since 2020. She has been vaccinated against COVID-19.    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    LMP Dates from Last 1 Encounters:   LMP: 10/28/2023     Pap (): Normal per patient    Labs / Significant Studies    Lab Visit on 06/15/2023   Component Date Value Ref Range Status    WBC 06/15/2023 4.70  3.90 - 12.70 K/uL Final    RBC 06/15/2023 4.54  4.00 - 5.40 M/uL Final    Hemoglobin 06/15/2023 13.6  12.0 - 16.0 g/dL Final    Hematocrit 06/15/2023 41.5  37.0 - 48.5 % Final    MCV 06/15/2023 91  82 - 98 fL Final    MCH 06/15/2023 30.0  27.0 - 31.0 pg Final    MCHC 06/15/2023 32.8  32.0 - 36.0 g/dL Final    RDW 06/15/2023 11.9  11.5 - 14.5 % Final    Platelets 06/15/2023 190  150 - 450 K/uL Final    MPV 06/15/2023 11.0  9.2 - 12.9 fL Final    Immature Granulocytes 06/15/2023 0.0  0.0 - 0.5 % Final    Gran # (ANC) 06/15/2023 2.3  1.8 - 7.7 K/uL Final    Immature Grans (Abs) 06/15/2023 0.00  0.00 - 0.04 K/uL Final    Comment: Mild elevation in immature granulocytes is non specific and   can be seen in a variety of conditions including stress response,   acute inflammation, trauma and pregnancy. Correlation with other   laboratory and clinical findings is essential.      Lymph # 06/15/2023 1.9  1.0 - 4.8 K/uL Final    Mono # 06/15/2023 0.3  0.3 - 1.0 K/uL Final    Eos #  06/15/2023 0.2  0.0 - 0.5 K/uL Final    Baso # 06/15/2023 0.03  0.00 - 0.20 K/uL Final    nRBC 06/15/2023 0  0 /100 WBC Final    Gran % 06/15/2023 48.1  38.0 - 73.0 % Final    Lymph % 06/15/2023 41.3  18.0 - 48.0 % Final    Mono % 06/15/2023 6.4  4.0 - 15.0 % Final    Eosinophil % 06/15/2023 3.6  0.0 - 8.0 % Final    Basophil % 06/15/2023 0.6  0.0 - 1.9 % Final    Differential Method 06/15/2023 Automated   Final    Sodium 06/15/2023 139  136 - 145 mmol/L Final    Potassium 06/15/2023 3.9  3.5 - 5.1 mmol/L Final    Chloride 06/15/2023 106  95 - 110 mmol/L Final    CO2 06/15/2023 24  23 - 29 mmol/L Final    Glucose 06/15/2023 86  70 - 110 mg/dL Final    BUN 06/15/2023 12  6 - 20 mg/dL Final    Creatinine 06/15/2023 0.7  0.5 - 1.4 mg/dL Final    Calcium 06/15/2023 9.4  8.7 - 10.5 mg/dL Final    Total Protein 06/15/2023 6.8  6.0 - 8.4 g/dL Final    Albumin 06/15/2023 4.1  3.5 - 5.2 g/dL Final    Total Bilirubin 06/15/2023 2.2 (H)  0.1 - 1.0 mg/dL Final    Comment: For infants and newborns, interpretation of results should be based  on gestational age, weight and in agreement with clinical  observations.    Premature Infant recommended reference ranges:  Up to 24 hours.............<8.0 mg/dL  Up to 48 hours............<12.0 mg/dL  3-5 days..................<15.0 mg/dL  6-29 days.................<15.0 mg/dL      Alkaline Phosphatase 06/15/2023 63  55 - 135 U/L Final    AST 06/15/2023 17  10 - 40 U/L Final    ALT 06/15/2023 13  10 - 44 U/L Final    Anion Gap 06/15/2023 9  8 - 16 mmol/L Final    eGFR 06/15/2023 >60.0  >60 mL/min/1.73 m^2 Final    Hemoglobin A1C 06/15/2023 5.1  4.0 - 5.6 % Final    Comment: ADA Screening Guidelines:  5.7-6.4%  Consistent with prediabetes  >or=6.5%  Consistent with diabetes    High levels of fetal hemoglobin interfere with the HbA1C  assay. Heterozygous hemoglobin variants (HbS, HgC, etc)do  not significantly interfere with this assay.   However, presence of multiple variants may affect  accuracy.      Estimated Avg Glucose 06/15/2023 100  68 - 131 mg/dL Final    Cholesterol 06/15/2023 169  120 - 199 mg/dL Final    Comment: The National Cholesterol Education Program (NCEP) has set the  following guidelines (reference ranges) for Cholesterol:  Optimal.....................<200 mg/dL  Borderline High.............200-239 mg/dL  High........................> or = 240 mg/dL      Triglycerides 06/15/2023 58  30 - 150 mg/dL Final    Comment: The National Cholesterol Education Program (NCEP) has set the  following guidelines (reference values) for triglycerides:  Normal......................<150 mg/dL  Borderline High.............150-199 mg/dL  High........................200-499 mg/dL      HDL 06/15/2023 56  40 - 75 mg/dL Final    Comment: The National Cholesterol Education Program (NCEP) has set the  following guidelines (reference values) for HDL Cholesterol:  Low...............<40 mg/dL  Optimal...........>60 mg/dL      LDL Cholesterol 06/15/2023 101.4  63.0 - 159.0 mg/dL Final    Comment: The National Cholesterol Education Program (NCEP) has set the  following guidelines (reference values) for LDL Cholesterol:  Optimal.......................<130 mg/dL  Borderline High...............130-159 mg/dL  High..........................160-189 mg/dL  Very High.....................>190 mg/dL      HDL/Cholesterol Ratio 06/15/2023 33.1  20.0 - 50.0 % Final    Total Cholesterol/HDL Ratio 06/15/2023 3.0  2.0 - 5.0 Final    Non-HDL Cholesterol 06/15/2023 113  mg/dL Final    Comment: Risk category and Non-HDL cholesterol goals:  Coronary heart disease (CHD)or equivalent (10-year risk of CHD >20%):  Non-HDL cholesterol goal     <130 mg/dL  Two or more CHD risk factors and 10-year risk of CHD <= 20%:  Non-HDL cholesterol goal     <160 mg/dL  0 to 1 CHD risk factor:  Non-HDL cholesterol goal     <190 mg/dL            Past Medical History:   Diagnosis Date    Accessory navicular bone of foot     right    ADD (attention  deficit disorder)     Cancer     Dysplastic nevus     Melanoma     Still's murmur      Past Surgical History:   Procedure Laterality Date    COLONOSCOPY N/A 2016    Procedure: COLONOSCOPY;  Surgeon: ROSY Ferguson MD;  Location: [address]);  Service: Endoscopy;  Laterality: N/A;    WISDOM TOOTH EXTRACTION         Current Outpatient Medications:     lisdexamfetamine (VYVANSE) 40 MG Cap, Take 1 capsule (40 mg total) by mouth once daily., Disp: 30 capsule, Rfl: 0    lisdexamfetamine (VYVANSE) 40 MG Cap, Take 1 capsule (40 mg total) by mouth once daily., Disp: 30 capsule, Rfl: 0    multivitamin (THERAGRAN) per tablet, Take 1 tablet by mouth once daily., Disp: , Rfl:     tretinoin (RETIN-A) 0.05 % cream, SMARTSI Topical Every Night, Disp: 20 g, Rfl: 1    (Magic mouthwash) 1:1:1 diphenhydrAMINE(Benadryl) 12.5mg/5ml liq, aluminum & magnesium hydroxide-simethicone (Maalox), LIDOcaine viscous 2%, Swish and spit 10 mLs every 4 (four) hours as needed (sore throat). for sore throat (Patient not taking: Reported on 2023), Disp: 360 mL, Rfl: 0    amoxicillin-clavulanate 875-125mg (AUGMENTIN) 875-125 mg per tablet, Take 1 tablet by mouth 2 (two) times daily., Disp: , Rfl:     chlorhexidine (PERIDEX) 0.12 % solution, 15 mLs 2 (two) times daily., Disp: , Rfl:     fluconazole (DIFLUCAN) 150 MG Tab, Take 150 mg by mouth., Disp: , Rfl:     nystatin (MYCOSTATIN) cream, Apply topically 2 (two) times daily., Disp: , Rfl:     Current Facility-Administered Medications:     levonorgestrel 17.5 mcg/24 hrs (5 yrs) 19.5 mg IUD 17.5 mcg, 17.5 mcg, Intrauterine, , Band, Marleni DIANA, DO, 17.5 mcg at 19 [id number]  Review of patient's allergies indicates:  No Known Allergies  OB History    Para Term  AB Living   0 0 0 0 0 0   SAB IAB Ectopic Multiple Live Births   0 0 0 0       Social History     Tobacco Use    Smoking status: Never    Smokeless tobacco: Never   Substance Use Topics    Alcohol use: Yes      Alcohol/week: 4.0 standard drinks of alcohol     Types: 4 Glasses of wine per week    Drug use: No     Family History   Problem Relation Age of Onset    Mitral valve prolapse Mother     Hyperlipidemia Mother     Osteoporosis Mother     Stroke Maternal Aunt         maternal great aunt    Cirrhosis Paternal Uncle     Breast cancer Neg Hx     Colon cancer Neg Hx     Ovarian cancer Neg Hx     Diabetes Neg Hx     Hypertension Neg Hx     Melanoma Neg Hx     Esophageal cancer Neg Hx        Review of Systems   Negative except as in HPI     Physical Exam   Vitals:    11/13/23 1435   BP: 122/67     Body mass index is 20.82 kg/m².    Physical Exam  Constitutional:       General: She is not in acute distress.     Appearance: Normal appearance.   Genitourinary:      Vulva, bladder and urethral meatus normal.      No vaginal discharge or bleeding.        Right Adnexa: not tender, not full and no mass present.     Left Adnexa: not tender, not full and no mass present.     No cervical motion tenderness or lesion.      IUD strings visualized.      Uterus is not tender.      No uterine mass detected.     Uterus is anteverted.      Bladder is not tender.       Pelvic exam was performed with patient in the lithotomy position.   Breasts:     Right: Normal. No mass, nipple discharge, skin change or tenderness.      Left: Normal. No mass, nipple discharge, skin change or tenderness.   HENT:      Head: Normocephalic and atraumatic.   Neck:      Thyroid: No thyroid mass, thyromegaly or thyroid tenderness.   Pulmonary:      Effort: Pulmonary effort is normal.   Abdominal:      General: Bowel sounds are normal. There is no distension.      Palpations: Abdomen is soft. There is no mass.      Tenderness: There is no abdominal tenderness. There is no guarding.   Musculoskeletal:         General: Normal range of motion.      Cervical back: Normal range of motion.   Lymphadenopathy:      Cervical: No cervical adenopathy.      Upper Body:       Right upper body: No supraclavicular, axillary or pectoral adenopathy.      Left upper body: No supraclavicular, axillary or pectoral adenopathy.   Neurological:      General: No focal deficit present.      Mental Status: She is alert and oriented to person, place, and time.   Skin:     General: Skin is warm and dry.   Psychiatric:         Mood and Affect: Mood normal.         Behavior: Behavior normal.         Thought Content: Thought content normal.         Judgment: Judgment normal.          ASSESSMENT:   Annual Well Women Exam  Patient Active Problem List   Diagnosis    Accessory navicular bone of foot    Dysmenorrhea in the adolescent    ADHD (attention deficit hyperactivity disorder), inattentive type    Hyperbilirubinemia    Liver hemangioma    Gilbert's syndrome    Encounter for well woman exam with routine gynecological exam     Health Maintenance Due   Topic Date Due    Pneumococcal Vaccines (Age 0-64) (1 - PCV) Never done    Pap Smear  05/09/2020    COVID-19 Vaccine (5 - 2023-24 season) 09/01/2023     Health Maintenance Topics with due status: Not Due       Topic Last Completion Date    TETANUS VACCINE 05/15/2018         PLAN:  Problem List Items Addressed This Visit          Renal/    Encounter for well woman exam with routine gynecological exam - Primary    Current Assessment & Plan     Normal breast and pelvic exams except as noted. Pap collected, STD screening declined. Continue breast self awareness, recommend 30 minutes of exercise 5 times weekly. RTC 1 year for annual exam, sooner PRN.             Other Visit Diagnoses       Cervical cancer screening        Relevant Orders    Liquid-Based Pap Smear, Screening            Follow up in about 1 year (around 11/13/2024) for Annual.       Bridget Harper MD  Department of Obstetrics & Gynecology  [facility]

## 2023-11-13 NOTE — ASSESSMENT & PLAN NOTE
Normal breast and pelvic exams except as noted. Pap collected, STD screening declined. Continue breast self awareness, recommend 30 minutes of exercise 5 times weekly. RTC 1 year for annual exam, sooner PRN.

## 2023-11-13 NOTE — TELEPHONE ENCOUNTER
No care due was identified.  Health [facility] Embedded Care Due Messages. Reference number: [id number].   11/12/2023 9:48:06 PM CST

## 2023-11-17 ENCOUNTER — OFFICE VISIT (OUTPATIENT)
Dept: FAMILY MEDICINE | Facility: CLINIC | Age: 28
End: 2023-11-17
Payer: COMMERCIAL

## 2023-11-17 VITALS
TEMPERATURE: 98 F | SYSTOLIC BLOOD PRESSURE: 92 MMHG | BODY MASS INDEX: 20.78 KG/M2 | DIASTOLIC BLOOD PRESSURE: 82 MMHG | OXYGEN SATURATION: 97 % | WEIGHT: 132.38 LBS | HEIGHT: 67 IN | HEART RATE: 80 BPM

## 2023-11-17 DIAGNOSIS — F90.0 ATTENTION DEFICIT HYPERACTIVITY DISORDER (ADHD), PREDOMINANTLY INATTENTIVE TYPE: Primary | ICD-10-CM

## 2023-11-17 DIAGNOSIS — Z00.00 ANNUAL PHYSICAL EXAM: ICD-10-CM

## 2023-11-17 PROCEDURE — 1159F MED LIST DOCD IN RCRD: CPT | Mod: CPTII,S$GLB,, | Performed by: FAMILY MEDICINE

## 2023-11-17 PROCEDURE — 1159F PR MEDICATION LIST DOCUMENTED IN MEDICAL RECORD: ICD-10-PCS | Mod: CPTII,S$GLB,, | Performed by: FAMILY MEDICINE

## 2023-11-17 PROCEDURE — 3074F SYST BP LT 130 MM HG: CPT | Mod: CPTII,S$GLB,, | Performed by: FAMILY MEDICINE

## 2023-11-17 PROCEDURE — 3008F PR BODY MASS INDEX (BMI) DOCUMENTED: ICD-10-PCS | Mod: CPTII,S$GLB,, | Performed by: FAMILY MEDICINE

## 2023-11-17 PROCEDURE — 99999 PR PBB SHADOW E&M-EST. PATIENT-LVL IV: CPT | Mod: PBBFAC,,, | Performed by: FAMILY MEDICINE

## 2023-11-17 PROCEDURE — 99213 PR OFFICE/OUTPT VISIT, EST, LEVL III, 20-29 MIN: ICD-10-PCS | Mod: S$GLB,,, | Performed by: FAMILY MEDICINE

## 2023-11-17 PROCEDURE — 1160F PR REVIEW ALL MEDS BY PRESCRIBER/CLIN PHARMACIST DOCUMENTED: ICD-10-PCS | Mod: CPTII,S$GLB,, | Performed by: FAMILY MEDICINE

## 2023-11-17 PROCEDURE — 99999 PR PBB SHADOW E&M-EST. PATIENT-LVL IV: ICD-10-PCS | Mod: PBBFAC,,, | Performed by: FAMILY MEDICINE

## 2023-11-17 PROCEDURE — 3044F PR MOST RECENT HEMOGLOBIN A1C LEVEL <7.0%: ICD-10-PCS | Mod: CPTII,S$GLB,, | Performed by: FAMILY MEDICINE

## 2023-11-17 PROCEDURE — 3079F DIAST BP 80-89 MM HG: CPT | Mod: CPTII,S$GLB,, | Performed by: FAMILY MEDICINE

## 2023-11-17 PROCEDURE — 99213 OFFICE O/P EST LOW 20 MIN: CPT | Mod: S$GLB,,, | Performed by: FAMILY MEDICINE

## 2023-11-17 PROCEDURE — 3044F HG A1C LEVEL LT 7.0%: CPT | Mod: CPTII,S$GLB,, | Performed by: FAMILY MEDICINE

## 2023-11-17 PROCEDURE — 3074F PR MOST RECENT SYSTOLIC BLOOD PRESSURE < 130 MM HG: ICD-10-PCS | Mod: CPTII,S$GLB,, | Performed by: FAMILY MEDICINE

## 2023-11-17 PROCEDURE — 1160F RVW MEDS BY RX/DR IN RCRD: CPT | Mod: CPTII,S$GLB,, | Performed by: FAMILY MEDICINE

## 2023-11-17 PROCEDURE — 3079F PR MOST RECENT DIASTOLIC BLOOD PRESSURE 80-89 MM HG: ICD-10-PCS | Mod: CPTII,S$GLB,, | Performed by: FAMILY MEDICINE

## 2023-11-17 PROCEDURE — 3008F BODY MASS INDEX DOCD: CPT | Mod: CPTII,S$GLB,, | Performed by: FAMILY MEDICINE

## 2023-11-17 RX ORDER — LISDEXAMFETAMINE DIMESYLATE 40 MG/1
40 CAPSULE ORAL DAILY
Qty: 30 CAPSULE | Refills: 0 | Status: SHIPPED | OUTPATIENT
Start: 2024-01-11

## 2023-11-17 RX ORDER — LISDEXAMFETAMINE DIMESYLATE 40 MG/1
40 CAPSULE ORAL DAILY
Qty: 30 CAPSULE | Refills: 0 | Status: SHIPPED | OUTPATIENT
Start: 2023-12-12 | End: 2024-02-19 | Stop reason: SDUPTHER

## 2023-11-17 NOTE — PROGRESS NOTES
Assessment & Plan:    Attention deficit hyperactivity disorder (ADHD), predominantly inattentive type  -     Toxicology Screen, Urine; Future  -     lisdexamfetamine (VYVANSE) 40 MG Cap; Take 1 capsule (40 mg total) by mouth once daily.  Dispense: 30 capsule; Refill: 0    Controlled. Medication(s) refilled.  reviewed.  Patient to submit UDS before next refill is due.     Annual physical exam  -     CBC Auto Differential; Future; Expected date: 11/17/2023  -     Comprehensive Metabolic Panel; Future; Expected date: 11/17/2023  -     Hemoglobin A1C; Future; Expected date: 11/17/2023  -     Lipid Panel; Future; Expected date: 11/17/2023    Fasting labs to be scheduled before annual visit.         Follow-up: Follow up in about 6 months (around 5/17/2024) for an annual visit.  ______________________________________________________________________    Chief Complaint  Chief Complaint   Patient presents with    ADHD       HPI  Melinda Mccann is a 28 y.o. female with medical diagnoses as listed in the medical history and problem list that presents to the office to follow up on ADHD, for which she was last seen on 6/12/2023. ADHD continues to be well controlled on Vyvanse. Tolerating this well.     Health Maintenance         Date Due Completion Date    COVID-19 Vaccine (5 - 2023-24 season) 09/01/2023 11/17/2022    Pap Smear 11/13/2024 (Originally 5/9/2020) 5/9/2017    Pneumococcal Vaccines (Age 0-64) (1 - PCV) 11/17/2024 (Originally 4/8/2002) ---    TETANUS VACCINE 05/15/2028 5/15/2018              PAST MEDICAL HISTORY:  Past Medical History:   Diagnosis Date    Accessory navicular bone of foot     right    ADD (attention deficit disorder)     Cancer     Dysplastic nevus     Melanoma     Still's murmur        PAST SURGICAL HISTORY:  Past Surgical History:   Procedure Laterality Date    COLONOSCOPY N/A 5/17/2016    Procedure: COLONOSCOPY;  Surgeon: ROSY Ferguson MD;  Location: [address]);  Service:  Endoscopy;  Laterality: N/A;    WISDOM TOOTH EXTRACTION         SOCIAL HISTORY:  Social History     Socioeconomic History    Marital status: Single   Occupational History    Occupation: [employer]   Tobacco Use    Smoking status: Never    Smokeless tobacco: Never   Substance and Sexual Activity    Alcohol use: Yes     Alcohol/week: 4.0 standard drinks of alcohol     Types: 4 Glasses of wine per week    Drug use: No    Sexual activity: Yes     Partners: Male     Birth control/protection: Implant   Other Topics Concern    Are you pregnant or think you may be? No    Breast-feeding No       FAMILY HISTORY:  Family History   Problem Relation Age of Onset    Mitral valve prolapse Mother     Hyperlipidemia Mother     Osteoporosis Mother     Stroke Maternal Aunt         maternal great aunt    Cirrhosis Paternal Uncle     Breast cancer Neg Hx     Colon cancer Neg Hx     Ovarian cancer Neg Hx     Diabetes Neg Hx     Hypertension Neg Hx     Melanoma Neg Hx     Esophageal cancer Neg Hx        ALLERGIES AND MEDICATIONS: updated and reviewed.  Review of patient's allergies indicates:  No Known Allergies  Current Outpatient Medications   Medication Sig Dispense Refill    multivitamin (THERAGRAN) per tablet Take 1 tablet by mouth once daily.      tretinoin (RETIN-A) 0.05 % cream SMARTSI Topical Every Night 20 g 1    (Magic mouthwash) 1:1:1 diphenhydrAMINE(Benadryl) 12.5mg/5ml liq, aluminum & magnesium hydroxide-simethicone (Maalox), LIDOcaine viscous 2% Swish and spit 10 mLs every 4 (four) hours as needed (sore throat). for sore throat (Patient not taking: Reported on 2023) 360 mL 0    amoxicillin-clavulanate 875-125mg (AUGMENTIN) 875-125 mg per tablet Take 1 tablet by mouth 2 (two) times daily.      chlorhexidine (PERIDEX) 0.12 % solution 15 mLs 2 (two) times daily.      fluconazole (DIFLUCAN) 150 MG Tab Take 150 mg by mouth.      [START ON 2023] lisdexamfetamine (VYVANSE) 40 MG Cap Take 1 capsule (40 mg total)  "by mouth once daily. 30 capsule 0    [START ON 1/11/2024] lisdexamfetamine (VYVANSE) 40 MG Cap Take 1 capsule (40 mg total) by mouth once daily. 30 capsule 0    nystatin (MYCOSTATIN) cream Apply topically 2 (two) times daily.       Current Facility-Administered Medications   Medication Dose Route Frequency Provider Last Rate Last Admin    levonorgestrel 17.5 mcg/24 hrs (5 yrs) 19.5 mg IUD 17.5 mcg  17.5 mcg Intrauterine  Band, Marelni DIANA, DO   17.5 mcg at 07/24/19 1358         ROS  Review of Systems   Constitutional:  Negative for unexpected weight change.   Neurological:  Negative for headaches.   Psychiatric/Behavioral:  Negative for decreased concentration and sleep disturbance. The patient is not nervous/anxious.            Physical Exam  Vitals:    11/17/23 1420   BP: 92/82   BP Location: Right arm   Patient Position: Sitting   BP Method: Medium (Manual)   Pulse: 80   Temp: 98.1 °F (36.7 °C)   TempSrc: Oral   SpO2: 97%   Weight: 60.1 kg (132 lb 6.2 oz)   Height: 5' 7" (1.702 m)    Body mass index is 20.73 kg/m².  Weight: 60.1 kg (132 lb 6.2 oz)   Height: 5' 7" (170.2 cm)   Physical Exam  Constitutional:       General: She is not in acute distress.  HENT:      Head: Normocephalic and atraumatic.   Skin:     General: Skin is warm and dry.   Neurological:      Mental Status: She is alert. Mental status is at baseline.   Psychiatric:         Mood and Affect: Mood normal.         Behavior: Behavior normal.             "

## 2023-11-22 ENCOUNTER — LAB VISIT (OUTPATIENT)
Dept: LAB | Facility: HOSPITAL | Age: 28
End: 2023-11-22
Attending: NURSE PRACTITIONER
Payer: COMMERCIAL

## 2023-11-22 DIAGNOSIS — F90.0 ATTENTION DEFICIT HYPERACTIVITY DISORDER (ADHD), PREDOMINANTLY INATTENTIVE TYPE: ICD-10-CM

## 2023-11-22 LAB
AMPHET+METHAMPHET UR QL: ABNORMAL
BARBITURATES UR QL SCN>200 NG/ML: NEGATIVE
BENZODIAZ UR QL SCN>200 NG/ML: NEGATIVE
BZE UR QL SCN: NEGATIVE
CANNABINOIDS UR QL SCN: NEGATIVE
CREAT UR-MCNC: 47 MG/DL (ref 15–325)
ETHANOL UR-MCNC: <10 MG/DL
METHADONE UR QL SCN>300 NG/ML: NEGATIVE
OPIATES UR QL SCN: NEGATIVE
PCP UR QL SCN>25 NG/ML: NEGATIVE
TOXICOLOGY INFORMATION: ABNORMAL

## 2023-11-22 PROCEDURE — 80307 DRUG TEST PRSMV CHEM ANLYZR: CPT | Performed by: FAMILY MEDICINE

## 2023-11-29 ENCOUNTER — OFFICE VISIT (OUTPATIENT)
Dept: DERMATOLOGY | Facility: CLINIC | Age: 28
End: 2023-11-29
Payer: COMMERCIAL

## 2023-11-29 VITALS — BODY MASS INDEX: 20.67 KG/M2 | WEIGHT: 132 LBS

## 2023-11-29 DIAGNOSIS — Z12.83 SKIN EXAM, SCREENING FOR CANCER: ICD-10-CM

## 2023-11-29 DIAGNOSIS — Z87.898 HISTORY OF ATYPICAL NEVUS: ICD-10-CM

## 2023-11-29 DIAGNOSIS — D22.9 NEVUS OF MULTIPLE SITES: Primary | ICD-10-CM

## 2023-11-29 PROCEDURE — 99213 OFFICE O/P EST LOW 20 MIN: CPT | Mod: S$GLB,,, | Performed by: DERMATOLOGY

## 2023-11-29 PROCEDURE — 1160F PR REVIEW ALL MEDS BY PRESCRIBER/CLIN PHARMACIST DOCUMENTED: ICD-10-PCS | Mod: CPTII,S$GLB,, | Performed by: DERMATOLOGY

## 2023-11-29 PROCEDURE — 3008F PR BODY MASS INDEX (BMI) DOCUMENTED: ICD-10-PCS | Mod: CPTII,S$GLB,, | Performed by: DERMATOLOGY

## 2023-11-29 PROCEDURE — 99999 PR PBB SHADOW E&M-EST. PATIENT-LVL III: ICD-10-PCS | Mod: PBBFAC,,, | Performed by: DERMATOLOGY

## 2023-11-29 PROCEDURE — 99999 PR PBB SHADOW E&M-EST. PATIENT-LVL III: CPT | Mod: PBBFAC,,, | Performed by: DERMATOLOGY

## 2023-11-29 PROCEDURE — 1159F PR MEDICATION LIST DOCUMENTED IN MEDICAL RECORD: ICD-10-PCS | Mod: CPTII,S$GLB,, | Performed by: DERMATOLOGY

## 2023-11-29 PROCEDURE — 1159F MED LIST DOCD IN RCRD: CPT | Mod: CPTII,S$GLB,, | Performed by: DERMATOLOGY

## 2023-11-29 PROCEDURE — 3044F PR MOST RECENT HEMOGLOBIN A1C LEVEL <7.0%: ICD-10-PCS | Mod: CPTII,S$GLB,, | Performed by: DERMATOLOGY

## 2023-11-29 PROCEDURE — 3008F BODY MASS INDEX DOCD: CPT | Mod: CPTII,S$GLB,, | Performed by: DERMATOLOGY

## 2023-11-29 PROCEDURE — 3044F HG A1C LEVEL LT 7.0%: CPT | Mod: CPTII,S$GLB,, | Performed by: DERMATOLOGY

## 2023-11-29 PROCEDURE — 1160F RVW MEDS BY RX/DR IN RCRD: CPT | Mod: CPTII,S$GLB,, | Performed by: DERMATOLOGY

## 2023-11-29 PROCEDURE — 99213 PR OFFICE/OUTPT VISIT, EST, LEVL III, 20-29 MIN: ICD-10-PCS | Mod: S$GLB,,, | Performed by: DERMATOLOGY

## 2023-11-30 NOTE — PROGRESS NOTES
Subjective:      Patient ID:  Melinda Mccann is a 28 y.o. female who presents for   Chief Complaint   Patient presents with    Skin Check     TBSE     Would like skin check no lesions of concern.         Review of Systems    Objective:   Physical Exam   Constitutional: She appears well-developed and well-nourished. No distress.   Eyes: No conjunctival no injection.   Cardiovascular:  There is no local extremity swelling and no dependent edema.             Neurological: She is alert and oriented to person, place, and time. She is not disoriented.   Psychiatric: She has a normal mood and affect.   Skin:   Areas Examined (abnormalities noted in diagram):   Head / Face Inspection Performed  Neck Inspection Performed  Chest / Axilla Inspection Performed  Abdomen Inspection Performed  Genitals / Buttocks / Groin Inspection Performed  Back Inspection Performed  RUE Inspected  LUE Inspection Performed  RLE Inspected  LLE Inspection Performed  Nails and Digits Inspection Performed                 Diagram Legend     Erythematous scaling macule/papule c/w actinic keratosis       Vascular papule c/w angioma      Pigmented verrucoid papule/plaque c/w seborrheic keratosis      Yellow umbilicated papule c/w sebaceous hyperplasia      Irregularly shaped tan macule c/w lentigo     1-2 mm smooth white papules consistent with Milia      Movable subcutaneous cyst with punctum c/w epidermal inclusion cyst      Subcutaneous movable cyst c/w pilar cyst      Firm pink to brown papule c/w dermatofibroma      Pedunculated fleshy papule(s) c/w skin tag(s)      Evenly pigmented macule c/w junctional nevus     Mildly variegated pigmented, slightly irregular-bordered macule c/w mildly atypical nevus      Flesh colored to evenly pigmented papule c/w intradermal nevus       Pink pearly papule/plaque c/w basal cell carcinoma      Erythematous hyperkeratotic cursted plaque c/w SCC      Surgical scar with no sign of skin cancer recurrence       "Open and closed comedones      Inflammatory papules and pustules      Verrucoid papule consistent consistent with wart     Erythematous eczematous patches and plaques     Dystrophic onycholytic nail with subungual debris c/w onychomycosis     Umbilicated papule    Erythematous-base heme-crusted tan verrucoid plaque consistent with inflamed seborrheic keratosis     Erythematous Silvery Scaling Plaque c/w Psoriasis     See annotation      Assessment / Plan:        Nevus of multiple sites  The "ABCD" rules to observe pigmented lesions were reviewed.  Brochure provided      History of atypical nevus  Comments:  moderate atypia 2016 right medial thigh  Possible atypical nevus right chest removed elsewhere    Skin exam, screening for cancer  . No lesions suspicious for malignancy noted.    Recommend daily sun protection/avoidance and use of at least SPF 30, broad spectrum sunscreen (OTC drug).                Follow up in about 1 year (around 11/29/2024).    "

## 2023-12-07 ENCOUNTER — PATIENT MESSAGE (OUTPATIENT)
Dept: DERMATOLOGY | Facility: CLINIC | Age: 28
End: 2023-12-07
Payer: COMMERCIAL

## 2023-12-08 DIAGNOSIS — L57.8 SOLAR AGING OF SKIN: Primary | ICD-10-CM

## 2023-12-08 RX ORDER — TRETINOIN 0.5 MG/G
CREAM TOPICAL
Qty: 20 G | Refills: 1 | Status: SHIPPED | OUTPATIENT
Start: 2023-12-08

## 2024-02-19 DIAGNOSIS — F90.0 ATTENTION DEFICIT HYPERACTIVITY DISORDER (ADHD), PREDOMINANTLY INATTENTIVE TYPE: ICD-10-CM

## 2024-02-20 RX ORDER — LISDEXAMFETAMINE DIMESYLATE 40 MG/1
40 CAPSULE ORAL DAILY
Qty: 30 CAPSULE | Refills: 0 | Status: SHIPPED | OUTPATIENT
Start: 2024-02-20 | End: 2024-03-25 | Stop reason: SDUPTHER

## 2024-02-20 NOTE — TELEPHONE ENCOUNTER
No care due was identified.  [facility] Embedded Care Due Messages. Reference number: [id number].   2/19/2024 8:43:50 PM CST

## 2024-03-11 ENCOUNTER — OFFICE VISIT (OUTPATIENT)
Dept: FAMILY MEDICINE | Facility: CLINIC | Age: 29
End: 2024-03-11
Payer: COMMERCIAL

## 2024-03-11 ENCOUNTER — TELEPHONE (OUTPATIENT)
Dept: FAMILY MEDICINE | Facility: CLINIC | Age: 29
End: 2024-03-11

## 2024-03-11 VITALS
OXYGEN SATURATION: 98 % | HEIGHT: 67 IN | SYSTOLIC BLOOD PRESSURE: 102 MMHG | DIASTOLIC BLOOD PRESSURE: 70 MMHG | WEIGHT: 133.38 LBS | HEART RATE: 91 BPM | BODY MASS INDEX: 20.93 KG/M2 | TEMPERATURE: 99 F

## 2024-03-11 DIAGNOSIS — J10.1 INFLUENZA A: Primary | ICD-10-CM

## 2024-03-11 DIAGNOSIS — J06.9 VIRAL UPPER RESPIRATORY TRACT INFECTION: Primary | ICD-10-CM

## 2024-03-11 LAB — SARS-COV-2 RNA RESP QL NAA+PROBE: NOT DETECTED

## 2024-03-11 PROCEDURE — 99213 OFFICE O/P EST LOW 20 MIN: CPT | Mod: S$GLB,,, | Performed by: FAMILY MEDICINE

## 2024-03-11 PROCEDURE — 1160F RVW MEDS BY RX/DR IN RCRD: CPT | Mod: CPTII,S$GLB,, | Performed by: FAMILY MEDICINE

## 2024-03-11 PROCEDURE — 99999 PR PBB SHADOW E&M-EST. PATIENT-LVL IV: CPT | Mod: PBBFAC,,, | Performed by: FAMILY MEDICINE

## 2024-03-11 PROCEDURE — 87635 SARS-COV-2 COVID-19 AMP PRB: CPT | Performed by: FAMILY MEDICINE

## 2024-03-11 PROCEDURE — 1159F MED LIST DOCD IN RCRD: CPT | Mod: CPTII,S$GLB,, | Performed by: FAMILY MEDICINE

## 2024-03-11 PROCEDURE — 3078F DIAST BP <80 MM HG: CPT | Mod: CPTII,S$GLB,, | Performed by: FAMILY MEDICINE

## 2024-03-11 PROCEDURE — 3008F BODY MASS INDEX DOCD: CPT | Mod: CPTII,S$GLB,, | Performed by: FAMILY MEDICINE

## 2024-03-11 PROCEDURE — 87502 INFLUENZA DNA AMP PROBE: CPT | Mod: QW,S$GLB,, | Performed by: FAMILY MEDICINE

## 2024-03-11 PROCEDURE — 3074F SYST BP LT 130 MM HG: CPT | Mod: CPTII,S$GLB,, | Performed by: FAMILY MEDICINE

## 2024-03-11 RX ORDER — OSELTAMIVIR PHOSPHATE 75 MG/1
75 CAPSULE ORAL 2 TIMES DAILY
Qty: 10 CAPSULE | Refills: 0 | Status: SHIPPED | OUTPATIENT
Start: 2024-03-11 | End: 2024-03-16

## 2024-03-11 NOTE — PROGRESS NOTES
Assessment & Plan  Viral upper respiratory tract infection  -     POCT Influenza A/B Molecular  -     COVID-19 Routine Screening; Future    Etiology most likely viral.  POC influenza and COVID tests performed.  Encouraged supportive care with rest, fluids, lozenges, Chloraseptic spray, and AlkaSeltzer Cold & Sinus.        Follow-up: Follow up if symptoms worsen or fail to improve.  ______________________________________________________________________    Chief Complaint  Chief Complaint   Patient presents with    Sore Throat    Nasal Congestion       HPI  Melinda Mccann is a 28 y.o. female with medical diagnoses as listed in the medical history and problem list that presents to the office for chills, sore throat, and PND. Patient recently returned home from a trip to [place], [place] and 6/7 girls on the trip tested positive for influenza.     Sore Throat   This is a new problem. The current episode started yesterday. The problem has been gradually worsening. There has been no fever. Associated symptoms include congestion. Pertinent negatives include no coughing, ear pain, headaches, hoarse voice or shortness of breath.         PAST MEDICAL HISTORY:  Past Medical History:   Diagnosis Date    Accessory navicular bone of foot     right    ADD (attention deficit disorder)     Cancer     Dysplastic nevus     Melanoma     Still's murmur        PAST SURGICAL HISTORY:  Past Surgical History:   Procedure Laterality Date    COLONOSCOPY N/A 5/17/2016    Procedure: COLONOSCOPY;  Surgeon: ROSY Ferguson MD;  Location: [address];  Service: Endoscopy;  Laterality: N/A;    WISDOM TOOTH EXTRACTION         SOCIAL HISTORY:  Social History     Socioeconomic History    Marital status: Single   Occupational History    Occupation: [employer]   Tobacco Use    Smoking status: Never    Smokeless tobacco: Never   Substance and Sexual Activity    Alcohol use: Yes     Alcohol/week: 4.0 standard drinks of alcohol     Types: 4  Glasses of wine per week    Drug use: No    Sexual activity: Yes     Partners: Male     Birth control/protection: Implant   Other Topics Concern    Are you pregnant or think you may be? No    Breast-feeding No       FAMILY HISTORY:  Family History   Problem Relation Age of Onset    Mitral valve prolapse Mother     Hyperlipidemia Mother     Osteoporosis Mother     Stroke Maternal Aunt         maternal great aunt    Cirrhosis Paternal Uncle     Breast cancer Neg Hx     Colon cancer Neg Hx     Ovarian cancer Neg Hx     Diabetes Neg Hx     Hypertension Neg Hx     Melanoma Neg Hx     Esophageal cancer Neg Hx        ALLERGIES AND MEDICATIONS: updated and reviewed.  Review of patient's allergies indicates:  No Known Allergies  Current Outpatient Medications   Medication Sig Dispense Refill    lisdexamfetamine (VYVANSE) 40 MG Cap Take 1 capsule (40 mg total) by mouth once daily. 30 capsule 0    lisdexamfetamine (VYVANSE) 40 MG Cap Take 1 capsule (40 mg total) by mouth once daily. 30 capsule 0    multivitamin (THERAGRAN) per tablet Take 1 tablet by mouth once daily.      tretinoin (RETIN-A) 0.05 % cream SMARTSI Topical Every Night 20 g 1    (Magic mouthwash) 1:1:1 diphenhydrAMINE(Benadryl) 12.5mg/5ml liq, aluminum & magnesium hydroxide-simethicone (Maalox), LIDOcaine viscous 2% Swish and spit 10 mLs every 4 (four) hours as needed (sore throat). for sore throat (Patient not taking: Reported on 3/11/2024) 360 mL 0    amoxicillin-clavulanate 875-125mg (AUGMENTIN) 875-125 mg per tablet Take 1 tablet by mouth 2 (two) times daily.      chlorhexidine (PERIDEX) 0.12 % solution 15 mLs 2 (two) times daily.      fluconazole (DIFLUCAN) 150 MG Tab Take 150 mg by mouth.      nystatin (MYCOSTATIN) cream Apply topically 2 (two) times daily.       Current Facility-Administered Medications   Medication Dose Route Frequency Provider Last Rate Last Admin    levonorgestrel 17.5 mcg/24 hrs (5 yrs) 19.5 mg IUD 17.5 mcg  17.5 mcg Intrauterine   "Marleni Armas, DO   17.5 mcg at 07/24/19 1358         ROS  Review of Systems   Constitutional:  Positive for chills. Negative for activity change, appetite change and fever.   HENT:  Positive for congestion, postnasal drip, rhinorrhea and sore throat. Negative for ear pain, hoarse voice, sinus pressure, sinus pain, sneezing and voice change.    Eyes:  Negative for discharge, redness and itching.   Respiratory:  Negative for cough, shortness of breath and wheezing.    Musculoskeletal:  Negative for myalgias.   Skin:  Negative for color change and rash.   Neurological:  Negative for dizziness and headaches.   Hematological:  Negative for adenopathy.           Physical Exam  Vitals:    03/11/24 1425   BP: 102/70   Pulse: 91   Temp: 99.2 °F (37.3 °C)   TempSrc: Oral   SpO2: 98%   Weight: 60.5 kg (133 lb 6.1 oz)   Height: 5' 7" (1.702 m)    Body mass index is 20.89 kg/m².  Weight: 60.5 kg (133 lb 6.1 oz)   Height: 5' 7" (170.2 cm)   Physical Exam  Constitutional:       General: She is not in acute distress.     Appearance: Normal appearance.   HENT:      Head: Normocephalic and atraumatic.      Right Ear: Tympanic membrane and ear canal normal.      Left Ear: Tympanic membrane and ear canal normal.      Nose: Nose normal.      Mouth/Throat:      Mouth: Mucous membranes are moist.      Pharynx: Posterior oropharyngeal erythema present.   Eyes:      Conjunctiva/sclera: Conjunctivae normal.      Pupils: Pupils are equal, round, and reactive to light.   Neck:      Thyroid: No thyromegaly.   Cardiovascular:      Rate and Rhythm: Normal rate and regular rhythm.      Pulses: Normal pulses.      Heart sounds: Normal heart sounds.   Pulmonary:      Effort: Pulmonary effort is normal. No respiratory distress.      Breath sounds: Normal breath sounds.   Musculoskeletal:      Cervical back: Neck supple.      Right lower leg: No edema.      Left lower leg: No edema.   Lymphadenopathy:      Cervical: No cervical adenopathy. "   Skin:     General: Skin is warm and dry.      Findings: No rash.   Neurological:      General: No focal deficit present.      Mental Status: She is alert and oriented to person, place, and time.   Psychiatric:         Mood and Affect: Mood normal.         Behavior: Behavior normal.         Thought Content: Thought content normal.

## 2024-03-12 LAB
CTP QC/QA: YES
POC MOLECULAR INFLUENZA A AGN: POSITIVE
POC MOLECULAR INFLUENZA B AGN: NEGATIVE

## 2024-03-25 DIAGNOSIS — F90.0 ATTENTION DEFICIT HYPERACTIVITY DISORDER (ADHD), PREDOMINANTLY INATTENTIVE TYPE: ICD-10-CM

## 2024-03-25 RX ORDER — LISDEXAMFETAMINE DIMESYLATE 40 MG/1
40 CAPSULE ORAL DAILY
Qty: 30 CAPSULE | Refills: 0 | Status: SHIPPED | OUTPATIENT
Start: 2024-03-25 | End: 2024-04-28 | Stop reason: SDUPTHER

## 2024-03-25 NOTE — TELEPHONE ENCOUNTER
No care due was identified.  [facility] Embedded Care Due Messages. Reference number: [id number].   3/25/2024 7:28:27 AM CDT

## 2024-04-28 DIAGNOSIS — F90.0 ATTENTION DEFICIT HYPERACTIVITY DISORDER (ADHD), PREDOMINANTLY INATTENTIVE TYPE: ICD-10-CM

## 2024-04-28 NOTE — TELEPHONE ENCOUNTER
No care due was identified.  [facility] Embedded Care Due Messages. Reference number: [id number].   4/28/2024 10:43:38 AM CDT

## 2024-04-29 RX ORDER — LISDEXAMFETAMINE DIMESYLATE 40 MG/1
40 CAPSULE ORAL DAILY
Qty: 30 CAPSULE | Refills: 0 | Status: SHIPPED | OUTPATIENT
Start: 2024-04-29 | End: 2024-06-03 | Stop reason: SDUPTHER

## 2024-05-10 ENCOUNTER — LAB VISIT (OUTPATIENT)
Dept: LAB | Facility: HOSPITAL | Age: 29
End: 2024-05-10
Attending: FAMILY MEDICINE
Payer: COMMERCIAL

## 2024-05-10 ENCOUNTER — OFFICE VISIT (OUTPATIENT)
Dept: DERMATOLOGY | Facility: CLINIC | Age: 29
End: 2024-05-10
Payer: COMMERCIAL

## 2024-05-10 DIAGNOSIS — Z00.00 ANNUAL PHYSICAL EXAM: ICD-10-CM

## 2024-05-10 DIAGNOSIS — L60.3 ONYCHODYSTROPHY: ICD-10-CM

## 2024-05-10 DIAGNOSIS — Z76.89 ENCOUNTER FOR SKIN CARE: Primary | ICD-10-CM

## 2024-05-10 LAB
ALBUMIN SERPL BCP-MCNC: 4.2 G/DL (ref 3.5–5.2)
ALP SERPL-CCNC: 65 U/L (ref 55–135)
ALT SERPL W/O P-5'-P-CCNC: 8 U/L (ref 10–44)
ANION GAP SERPL CALC-SCNC: 7 MMOL/L (ref 8–16)
AST SERPL-CCNC: 14 U/L (ref 10–40)
BASOPHILS # BLD AUTO: 0.03 K/UL (ref 0–0.2)
BASOPHILS NFR BLD: 0.7 % (ref 0–1.9)
BILIRUB SERPL-MCNC: 2.4 MG/DL (ref 0.1–1)
BUN SERPL-MCNC: 15 MG/DL (ref 6–20)
CALCIUM SERPL-MCNC: 9.8 MG/DL (ref 8.7–10.5)
CHLORIDE SERPL-SCNC: 105 MMOL/L (ref 95–110)
CHOLEST SERPL-MCNC: 175 MG/DL (ref 120–199)
CHOLEST/HDLC SERPL: 2.7 {RATIO} (ref 2–5)
CO2 SERPL-SCNC: 26 MMOL/L (ref 23–29)
CREAT SERPL-MCNC: 0.7 MG/DL (ref 0.5–1.4)
DIFFERENTIAL METHOD BLD: NORMAL
EOSINOPHIL # BLD AUTO: 0.2 K/UL (ref 0–0.5)
EOSINOPHIL NFR BLD: 5 % (ref 0–8)
ERYTHROCYTE [DISTWIDTH] IN BLOOD BY AUTOMATED COUNT: 11.9 % (ref 11.5–14.5)
EST. GFR  (NO RACE VARIABLE): >60 ML/MIN/1.73 M^2
ESTIMATED AVG GLUCOSE: 103 MG/DL (ref 68–131)
GLUCOSE SERPL-MCNC: 99 MG/DL (ref 70–110)
HBA1C MFR BLD: 5.2 % (ref 4–5.6)
HCT VFR BLD AUTO: 43.3 % (ref 37–48.5)
HDLC SERPL-MCNC: 64 MG/DL (ref 40–75)
HDLC SERPL: 36.6 % (ref 20–50)
HGB BLD-MCNC: 14.3 G/DL (ref 12–16)
IMM GRANULOCYTES # BLD AUTO: 0.01 K/UL (ref 0–0.04)
IMM GRANULOCYTES NFR BLD AUTO: 0.2 % (ref 0–0.5)
LDLC SERPL CALC-MCNC: 95.4 MG/DL (ref 63–159)
LYMPHOCYTES # BLD AUTO: 1.6 K/UL (ref 1–4.8)
LYMPHOCYTES NFR BLD: 36 % (ref 18–48)
MCH RBC QN AUTO: 30.8 PG (ref 27–31)
MCHC RBC AUTO-ENTMCNC: 33 G/DL (ref 32–36)
MCV RBC AUTO: 93 FL (ref 82–98)
MONOCYTES # BLD AUTO: 0.3 K/UL (ref 0.3–1)
MONOCYTES NFR BLD: 7.5 % (ref 4–15)
NEUTROPHILS # BLD AUTO: 2.2 K/UL (ref 1.8–7.7)
NEUTROPHILS NFR BLD: 50.6 % (ref 38–73)
NONHDLC SERPL-MCNC: 111 MG/DL
NRBC BLD-RTO: 0 /100 WBC
PLATELET # BLD AUTO: 195 K/UL (ref 150–450)
PMV BLD AUTO: 11.4 FL (ref 9.2–12.9)
POTASSIUM SERPL-SCNC: 4.3 MMOL/L (ref 3.5–5.1)
PROT SERPL-MCNC: 6.6 G/DL (ref 6–8.4)
RBC # BLD AUTO: 4.65 M/UL (ref 4–5.4)
SODIUM SERPL-SCNC: 138 MMOL/L (ref 136–145)
TRIGL SERPL-MCNC: 78 MG/DL (ref 30–150)
WBC # BLD AUTO: 4.42 K/UL (ref 3.9–12.7)

## 2024-05-10 PROCEDURE — 99214 OFFICE O/P EST MOD 30 MIN: CPT | Mod: S$GLB,,, | Performed by: DERMATOLOGY

## 2024-05-10 PROCEDURE — 80053 COMPREHEN METABOLIC PANEL: CPT | Performed by: FAMILY MEDICINE

## 2024-05-10 PROCEDURE — 80061 LIPID PANEL: CPT | Performed by: FAMILY MEDICINE

## 2024-05-10 PROCEDURE — 1159F MED LIST DOCD IN RCRD: CPT | Mod: CPTII,S$GLB,, | Performed by: DERMATOLOGY

## 2024-05-10 PROCEDURE — 83036 HEMOGLOBIN GLYCOSYLATED A1C: CPT | Performed by: FAMILY MEDICINE

## 2024-05-10 PROCEDURE — 36415 COLL VENOUS BLD VENIPUNCTURE: CPT | Mod: PO | Performed by: FAMILY MEDICINE

## 2024-05-10 PROCEDURE — 99999 PR PBB SHADOW E&M-EST. PATIENT-LVL III: CPT | Mod: PBBFAC,,, | Performed by: DERMATOLOGY

## 2024-05-10 PROCEDURE — G2211 COMPLEX E/M VISIT ADD ON: HCPCS | Mod: S$GLB,,, | Performed by: DERMATOLOGY

## 2024-05-10 PROCEDURE — 85025 COMPLETE CBC W/AUTO DIFF WBC: CPT | Performed by: FAMILY MEDICINE

## 2024-05-10 PROCEDURE — 1160F RVW MEDS BY RX/DR IN RCRD: CPT | Mod: CPTII,S$GLB,, | Performed by: DERMATOLOGY

## 2024-05-10 RX ORDER — CICLOPIROX 80 MG/ML
SOLUTION TOPICAL NIGHTLY
Qty: 6.6 ML | Refills: 6 | Status: SHIPPED | OUTPATIENT
Start: 2024-05-10

## 2024-05-10 RX ORDER — AMMONIUM LACTATE 12 G/100G
CREAM TOPICAL
Qty: 140 G | Refills: 3 | Status: SHIPPED | OUTPATIENT
Start: 2024-05-10

## 2024-05-10 NOTE — PROGRESS NOTES
Subjective:      Patient ID:  Melinda Mccann is a 29 y.o. female who presents for   Chief Complaint   Patient presents with    Fungus     Fungus - Initial  Affected locations: right toes and left toes  Duration: 2 weeks  Severity: mild to moderate  Timing: constant  Relieving factors/Treatments tried: nothing      Review of Systems   Constitutional: Negative.    HENT: Negative.     Respiratory: Negative.     Musculoskeletal: Negative.        Objective:   Physical Exam   Constitutional: She appears well-developed and well-nourished.   Neurological: She is alert and oriented to person, place, and time.   Psychiatric: She has a normal mood and affect.   Skin:              Diagram Legend     Erythematous scaling macule/papule c/w actinic keratosis       Vascular papule c/w angioma      Pigmented verrucoid papule/plaque c/w seborrheic keratosis      Yellow umbilicated papule c/w sebaceous hyperplasia      Irregularly shaped tan macule c/w lentigo     1-2 mm smooth white papules consistent with Milia      Movable subcutaneous cyst with punctum c/w epidermal inclusion cyst      Subcutaneous movable cyst c/w pilar cyst      Firm pink to brown papule c/w dermatofibroma      Pedunculated fleshy papule(s) c/w skin tag(s)      Evenly pigmented macule c/w junctional nevus     Mildly variegated pigmented, slightly irregular-bordered macule c/w mildly atypical nevus      Flesh colored to evenly pigmented papule c/w intradermal nevus       Pink pearly papule/plaque c/w basal cell carcinoma      Erythematous hyperkeratotic cursted plaque c/w SCC      Surgical scar with no sign of skin cancer recurrence      Open and closed comedones      Inflammatory papules and pustules      Verrucoid papule consistent consistent with wart     Erythematous eczematous patches and plaques     Dystrophic onycholytic nail with subungual debris c/w onychomycosis     Umbilicated papule    Erythematous-base heme-crusted tan verrucoid plaque  consistent with inflamed seborrheic keratosis     Erythematous Silvery Scaling Plaque c/w Psoriasis     See annotation    F nails good.    Assessment / Plan:        Encounter for skin care  No hot water bathing reviewed.    Onychodystrophy  -     ciclopirox (PENLAC) 8 % Soln; Apply topically nightly. To bad nails  Dispense: 6.6 mL; Refill: 6  -     ammonium lactate 12 % Crea; Qam and qafternoon to bad nails  Dispense: 140 g; Refill: 3  Discussed with patient the etiology and pathogenesis of the disease or skin lesion(s) and possible treatments and aggravators.    Chronic nature of this condition discussed with patient.  Reviewed with patient different treatment options and associated risks.  Proper application of medications and or care for affected area(s) and condition(s) reviewed.  Patient instructed to start Amlactin cream or lotion nightly to AK prone areas or other specified affected areas.  Warned of skin irritation and to decrease frequency of usage if this occurs.  Instructed patient to avoid hot water on the fingernails and toenails and to cut them short.  Can try Amlactin or Urea cream nightly and to watch for skin irritation.  If this occurs, use less often.  Discussed that toenails changes are common after the age of thirty with decreased blood flow and venous back pressure.  Fingernail changes can be spontaneous also with changes later in life.  This may be a chronic condition without much improvement with limited treatments.  Can also use petroleum jelly regularly.  No more artifical nails or press ons and no more polish.  Also reviewed that fungal infection is unlikely to be the primary issue.  Offered oral antifungal therapy with risks of liver irritation and risks of recurrence of nail changes, especially as primary architectural changes are suspected.  If any nail lifting, patient to keep under nails as dry as possible with blow drying after water exposure.             Follow up in about 1 year  (around 5/10/2025).

## 2024-05-10 NOTE — PATIENT INSTRUCTIONS
Instructed patient to avoid hot water on the fingernails and toenails and to cut them short.  Can try Amlactin or Urea cream nightly and to watch for skin irritation.  If this occurs, use less often.  Discussed that toenails changes are common after the age of thirty with decreased blood flow and venous back pressure.  Fingernail changes can be spontaneous also with changes later in life.  This may be a chronic condition without much improvement with limited treatments.  Can also use petroleum jelly regularly.  No more artifical nails or press ons and no more polish.  Also reviewed that fungal infection is unlikely to be the primary issue.  Offered oral antifungal therapy with risks of liver irritation and risks of recurrence of nail changes, especially as primary architectural changes are suspected.  If any nail lifting, patient to keep under nails as dry as possible with blow drying after water exposure.

## 2024-05-16 DIAGNOSIS — L57.8 SOLAR AGING OF SKIN: ICD-10-CM

## 2024-05-17 RX ORDER — TRETINOIN 0.5 MG/G
CREAM TOPICAL
Qty: 20 G | Refills: 1 | Status: SHIPPED | OUTPATIENT
Start: 2024-05-17

## 2024-05-20 ENCOUNTER — OFFICE VISIT (OUTPATIENT)
Dept: FAMILY MEDICINE | Facility: CLINIC | Age: 29
End: 2024-05-20
Payer: COMMERCIAL

## 2024-05-20 VITALS
TEMPERATURE: 98 F | WEIGHT: 134.06 LBS | HEART RATE: 83 BPM | OXYGEN SATURATION: 97 % | DIASTOLIC BLOOD PRESSURE: 82 MMHG | SYSTOLIC BLOOD PRESSURE: 102 MMHG | BODY MASS INDEX: 21.04 KG/M2 | HEIGHT: 67 IN

## 2024-05-20 DIAGNOSIS — Z00.00 ANNUAL PHYSICAL EXAM: Primary | ICD-10-CM

## 2024-05-20 DIAGNOSIS — F90.0 ATTENTION DEFICIT HYPERACTIVITY DISORDER (ADHD), PREDOMINANTLY INATTENTIVE TYPE: ICD-10-CM

## 2024-05-20 DIAGNOSIS — E80.4 GILBERT'S DISEASE: ICD-10-CM

## 2024-05-20 PROCEDURE — 3008F BODY MASS INDEX DOCD: CPT | Mod: CPTII,S$GLB,, | Performed by: FAMILY MEDICINE

## 2024-05-20 PROCEDURE — 99999 PR PBB SHADOW E&M-EST. PATIENT-LVL IV: CPT | Mod: PBBFAC,,, | Performed by: FAMILY MEDICINE

## 2024-05-20 PROCEDURE — 3079F DIAST BP 80-89 MM HG: CPT | Mod: CPTII,S$GLB,, | Performed by: FAMILY MEDICINE

## 2024-05-20 PROCEDURE — 3074F SYST BP LT 130 MM HG: CPT | Mod: CPTII,S$GLB,, | Performed by: FAMILY MEDICINE

## 2024-05-20 PROCEDURE — 1160F RVW MEDS BY RX/DR IN RCRD: CPT | Mod: CPTII,S$GLB,, | Performed by: FAMILY MEDICINE

## 2024-05-20 PROCEDURE — 1159F MED LIST DOCD IN RCRD: CPT | Mod: CPTII,S$GLB,, | Performed by: FAMILY MEDICINE

## 2024-05-20 PROCEDURE — 3044F HG A1C LEVEL LT 7.0%: CPT | Mod: CPTII,S$GLB,, | Performed by: FAMILY MEDICINE

## 2024-05-20 PROCEDURE — 99395 PREV VISIT EST AGE 18-39: CPT | Mod: S$GLB,,, | Performed by: FAMILY MEDICINE

## 2024-05-20 RX ORDER — ZOLPIDEM TARTRATE 5 MG/1
1 TABLET ORAL NIGHTLY
COMMUNITY
End: 2024-05-20

## 2024-05-20 RX ORDER — ESCITALOPRAM OXALATE 10 MG/1
1 TABLET ORAL DAILY
COMMUNITY
End: 2024-05-20

## 2024-05-20 NOTE — PROGRESS NOTES
Assessment & Plan:    Annual physical exam  Labs reviewed and are within expected limits.     Attention deficit hyperactivity disorder (ADHD), predominantly inattentive type  Controlled. Continue current therapy.     Gilbert's disease  Chronic, stable.       Follow-up: Follow up in about 6 months (around 11/20/2024) for Virtual Visit -ADHD.  ______________________________________________________________________    Chief Complaint  Chief Complaint   Patient presents with    Annual Exam       HPI  Melinda Mccann is a 29 y.o. female with medical diagnoses as listed in the medical history and problem list that presents to the office for an annual exam. She is in her usual state of health and does not report any new concerns.     Health Maintenance         Date Due Completion Date    COVID-19 Vaccine (5 - 2023-24 season) 09/01/2023 11/17/2022    Pap Smear 11/13/2024 (Originally 5/9/2020) 5/9/2017    Pneumococcal Vaccines (Age 0-64) (1 of 2 - PCV) 11/17/2024 (Originally 4/8/2002) ---    TETANUS VACCINE 05/15/2028 5/15/2018              PAST MEDICAL HISTORY:  Past Medical History:   Diagnosis Date    Accessory navicular bone of foot     right    ADD (attention deficit disorder)     Cancer     Dysplastic nevus     Melanoma     Still's murmur        PAST SURGICAL HISTORY:  Past Surgical History:   Procedure Laterality Date    COLONOSCOPY N/A 5/17/2016    Procedure: COLONOSCOPY;  Surgeon: ROSY Ferguson MD;  Location: [address];  Service: Endoscopy;  Laterality: N/A;    WISDOM TOOTH EXTRACTION         SOCIAL HISTORY:  Social History     Socioeconomic History    Marital status: Single   Occupational History    Occupation: [employer]   Tobacco Use    Smoking status: Never    Smokeless tobacco: Never   Substance and Sexual Activity    Alcohol use: Yes     Alcohol/week: 4.0 standard drinks of alcohol     Types: 4 Glasses of wine per week    Drug use: No    Sexual activity: Yes     Partners: Male     Birth  control/protection: Implant   Other Topics Concern    Are you pregnant or think you may be? No    Breast-feeding No       FAMILY HISTORY:  Family History   Problem Relation Name Age of Onset    Mitral valve prolapse Mother      Hyperlipidemia Mother      Osteoporosis Mother      Stroke Maternal Aunt          maternal great aunt    Cirrhosis Paternal Uncle      Breast cancer Neg Hx      Colon cancer Neg Hx      Ovarian cancer Neg Hx      Diabetes Neg Hx      Hypertension Neg Hx      Melanoma Neg Hx      Esophageal cancer Neg Hx         ALLERGIES AND MEDICATIONS: updated and reviewed.  Review of patient's allergies indicates:  No Known Allergies  Current Outpatient Medications   Medication Sig Dispense Refill    ammonium lactate 12 % Crea Qam and qafternoon to bad nails 140 g 3    ciclopirox (PENLAC) 8 % Soln Apply topically nightly. To bad nails 6.6 mL 6    fluconazole (DIFLUCAN) 150 MG Tab Take 150 mg by mouth.      lisdexamfetamine (VYVANSE) 40 MG Cap Take 1 capsule (40 mg total) by mouth once daily. 30 capsule 0    lisdexamfetamine (VYVANSE) 40 MG Cap Take 1 capsule (40 mg total) by mouth once daily. 30 capsule 0    multivitamin (THERAGRAN) per tablet Take 1 tablet by mouth once daily.      tretinoin (RETIN-A) 0.05 % cream APPLY EXTERNALLY TO THE AFFECTED AREA EVERY NIGHT 20 g 1     Current Facility-Administered Medications   Medication Dose Route Frequency Provider Last Rate Last Admin    levonorgestrel 17.5 mcg/24 hrs (5 yrs) 19.5 mg IUD 17.5 mcg  17.5 mcg Intrauterine  Band, Marleni DIANA DO   17.5 mcg at 07/24/19 1358         ROS  Review of Systems   Constitutional:  Negative for activity change, fever and unexpected weight change.   HENT:  Negative for congestion and sore throat.    Eyes:  Negative for photophobia and visual disturbance.   Respiratory:  Negative for cough and shortness of breath.    Cardiovascular:  Negative for chest pain and leg swelling.   Gastrointestinal:  Negative for abdominal pain,  "constipation, diarrhea, nausea and vomiting.   Endocrine: Negative for polydipsia, polyphagia and polyuria.   Genitourinary:  Negative for dysuria and urgency.   Musculoskeletal:  Negative for arthralgias and gait problem.   Neurological:  Negative for dizziness, weakness and headaches.   Psychiatric/Behavioral:  Negative for decreased concentration, dysphoric mood and sleep disturbance. The patient is not nervous/anxious.            Physical Exam  Vitals:    05/20/24 1420   BP: 102/82   BP Location: Right arm   Patient Position: Sitting   BP Method: Medium (Manual)   Pulse: 83   Temp: 97.7 °F (36.5 °C)   TempSrc: Oral   SpO2: 97%   Weight: 60.8 kg (134 lb 0.6 oz)   Height: 5' 7" (1.702 m)    Body mass index is 20.99 kg/m².  Weight: 60.8 kg (134 lb 0.6 oz)   Height: 5' 7" (170.2 cm)   Physical Exam  Constitutional:       General: She is not in acute distress.  HENT:      Head: Normocephalic and atraumatic.   Neck:      Thyroid: No thyromegaly.   Cardiovascular:      Rate and Rhythm: Normal rate and regular rhythm.      Pulses: Normal pulses.      Heart sounds: Normal heart sounds.   Pulmonary:      Effort: Pulmonary effort is normal. No respiratory distress.      Breath sounds: Normal breath sounds.   Musculoskeletal:      Cervical back: Neck supple.      Right lower leg: No edema.      Left lower leg: No edema.   Lymphadenopathy:      Cervical: No cervical adenopathy.   Skin:     General: Skin is warm and dry.      Findings: No rash.   Neurological:      General: No focal deficit present.      Mental Status: She is alert and oriented to person, place, and time.   Psychiatric:         Mood and Affect: Mood normal.         Behavior: Behavior normal.         Thought Content: Thought content normal.             "

## 2024-05-31 ENCOUNTER — TELEPHONE (OUTPATIENT)
Dept: FAMILY MEDICINE | Facility: CLINIC | Age: 29
End: 2024-05-31
Payer: COMMERCIAL

## 2024-06-03 DIAGNOSIS — F90.0 ATTENTION DEFICIT HYPERACTIVITY DISORDER (ADHD), PREDOMINANTLY INATTENTIVE TYPE: ICD-10-CM

## 2024-06-03 RX ORDER — LISDEXAMFETAMINE DIMESYLATE 40 MG/1
40 CAPSULE ORAL DAILY
Qty: 30 CAPSULE | Refills: 0 | Status: SHIPPED | OUTPATIENT
Start: 2024-06-03

## 2024-06-03 NOTE — TELEPHONE ENCOUNTER
No care due was identified.  Health [facility] Embedded Care Due Messages. Reference number: [id number].   6/03/2024 12:47:08 PM CDT

## 2024-06-20 ENCOUNTER — OFFICE VISIT (OUTPATIENT)
Dept: PODIATRY | Facility: CLINIC | Age: 29
End: 2024-06-20
Payer: COMMERCIAL

## 2024-06-20 VITALS — HEIGHT: 67 IN | WEIGHT: 134.06 LBS | BODY MASS INDEX: 21.04 KG/M2

## 2024-06-20 DIAGNOSIS — L60.0 INGROWN NAIL: Primary | ICD-10-CM

## 2024-06-20 DIAGNOSIS — M79.674 TOE PAIN, BILATERAL: ICD-10-CM

## 2024-06-20 DIAGNOSIS — M79.675 TOE PAIN, BILATERAL: ICD-10-CM

## 2024-06-20 PROCEDURE — 99999 PR PBB SHADOW E&M-EST. PATIENT-LVL III: CPT | Mod: PBBFAC,,, | Performed by: PODIATRIST

## 2024-06-20 PROCEDURE — 3008F BODY MASS INDEX DOCD: CPT | Mod: CPTII,S$GLB,, | Performed by: PODIATRIST

## 2024-06-20 PROCEDURE — 1159F MED LIST DOCD IN RCRD: CPT | Mod: CPTII,S$GLB,, | Performed by: PODIATRIST

## 2024-06-20 PROCEDURE — 1160F RVW MEDS BY RX/DR IN RCRD: CPT | Mod: CPTII,S$GLB,, | Performed by: PODIATRIST

## 2024-06-20 PROCEDURE — 3044F HG A1C LEVEL LT 7.0%: CPT | Mod: CPTII,S$GLB,, | Performed by: PODIATRIST

## 2024-06-20 PROCEDURE — 99203 OFFICE O/P NEW LOW 30 MIN: CPT | Mod: S$GLB,,, | Performed by: PODIATRIST

## 2024-06-20 NOTE — PROGRESS NOTES
Subjective:      Patient ID: Melinda Mccann is a 29 y.o. female.    Chief Complaint: Toe Pain (R great toe pain)    Sharp, throbbing pain right and left big toes/nails.  Chronic condition present off and on with worsening improvement in periods over time for the past several years.  Aggravated by sometimes by socks shoes pressure ambulation.  No prior medical treatment.  No self-treatment.  Patient denies trauma and surgery both feet.  She used currently using topical ciclopirox and Lac-Hydrin under the direction of Dermatology with good improvement noted.    Review of Systems   Constitutional: Negative for chills, diaphoresis, fever, malaise/fatigue and night sweats.   Cardiovascular:  Negative for claudication, cyanosis, leg swelling and syncope.   Skin:  Positive for nail changes. Negative for color change, dry skin, rash, suspicious lesions and unusual hair distribution.   Musculoskeletal:  Negative for falls, joint pain, joint swelling, muscle cramps, muscle weakness and stiffness.   Gastrointestinal:  Negative for constipation, diarrhea, nausea and vomiting.   Neurological:  Negative for brief paralysis, disturbances in coordination, focal weakness, numbness, paresthesias, sensory change and tremors.         Objective:      Physical Exam  Constitutional:       General: She is not in acute distress.     Appearance: She is well-developed. She is not diaphoretic.   Cardiovascular:      Pulses:           Popliteal pulses are 2+ on the right side and 2+ on the left side.        Dorsalis pedis pulses are 2+ on the right side and 2+ on the left side.        Posterior tibial pulses are 2+ on the right side and 2+ on the left side.      Comments: Capillary refill 3 seconds all toes/distal feet, all toes/both feet warm to touch.      Negative lymphadenopathy bilateral popliteal fossa and tarsal tunnel.      Negavie lower extremity edema bilateral.    Musculoskeletal:      Right ankle: No swelling, deformity,  ecchymosis or lacerations. Normal range of motion. Normal pulse.      Right Achilles Tendon: Normal. No defects. Nelson's test negative.   Lymphadenopathy:      Lower Body: No right inguinal adenopathy. No left inguinal adenopathy.      Comments: Negative lymphadenopathy bilateral popliteal fossa and tarsal tunnel.    Negative lymphangitic streaking bilateral feet/ankles/legs.   Skin:     General: Skin is warm and dry.      Capillary Refill: Capillary refill takes 2 to 3 seconds.      Coloration: Skin is not pale.      Findings: No abrasion, bruising, burn, ecchymosis, erythema, laceration, lesion or rash.      Nails: There is no clubbing.      Comments: Tenderness to palpation with visible and palpable incurvation nail at the medial and lateral borders of the right and left hallux toenails without open skin drainage pus tracking fluctuance malodor cardinal signs of infection of the periungual skin.      Otherwise Skin is normal age and health appropriate color, turgor, texture, and temperature bilateral lower extremities without ulceration, hyperpigmentation, discoloration, masses nodules or cords palpated.  No ecchymosis, erythema, edema, or cardinal signs of infection bilateral lower extremities.      Neurological:      Mental Status: She is alert and oriented to person, place, and time.      Sensory: No sensory deficit.      Motor: No tremor, atrophy or abnormal muscle tone.      Gait: Gait normal.   Psychiatric:         Behavior: Behavior is cooperative.           Assessment:       Encounter Diagnoses   Name Primary?    Ingrown nail Yes    Toe pain, bilateral          Plan:       Melinda was seen today for toe pain.    Diagnoses and all orders for this visit:    Ingrown nail    Toe pain, bilateral      I counseled the patient on her conditions, their implications and medical management.    Dry well after hygiene.  Continue topical application of ciclopirox and Lac-Hydrin from Dermatology.      Use a  cuticle brush after bathing to help keep the nail borders medially and laterally clean debris free and smooth edged 2 encouraged the nail to grow out without digging into the skin.    Utilizing sterile toenail clippers I aggressively debrided the offending nail border approximately 3 mm from its edge and carried the nail plate incision down at an angle in order to wedge out the offending cryptotic portion of the nail plate. The offending border was then removed in toto. The area was cleansed with alcohol. Patient tolerated the procedure well and related significant relief.          Follow up if symptoms worsen or fail to improve.

## 2024-07-02 ENCOUNTER — PATIENT MESSAGE (OUTPATIENT)
Dept: OBSTETRICS AND GYNECOLOGY | Facility: CLINIC | Age: 29
End: 2024-07-02
Payer: COMMERCIAL

## 2024-07-02 DIAGNOSIS — N92.6 IRREGULAR MENSTRUAL BLEEDING: Primary | ICD-10-CM

## 2024-07-05 DIAGNOSIS — N92.6 IRREGULAR MENSTRUAL BLEEDING: ICD-10-CM

## 2024-07-05 DIAGNOSIS — F90.0 ATTENTION DEFICIT HYPERACTIVITY DISORDER (ADHD), PREDOMINANTLY INATTENTIVE TYPE: ICD-10-CM

## 2024-07-05 RX ORDER — NORGESTIMATE AND ETHINYL ESTRADIOL 0.25-0.035
1 KIT ORAL DAILY
Qty: 28 TABLET | Refills: 1 | Status: SHIPPED | OUTPATIENT
Start: 2024-07-05 | End: 2025-07-05

## 2024-07-05 NOTE — TELEPHONE ENCOUNTER
No care due was identified.  [facility] Embedded Care Due Messages. Reference number: [id number].   7/05/2024 8:52:17 AM CDT

## 2024-07-07 RX ORDER — NORGESTIMATE AND ETHINYL ESTRADIOL 0.25-0.035
KIT ORAL
Qty: 84 TABLET | OUTPATIENT
Start: 2024-07-07

## 2024-07-07 NOTE — TELEPHONE ENCOUNTER
Refill Decision Note   Melinda Mccann  is requesting a refill authorization.  Brief Assessment and Rationale for Refill:  Quick Discontinue     Medication Therapy Plan:  Receipt confirmed by pharmacy (7/5/2024  3:25 PM CDT)      Comments:     Note composed:2:20 AM 07/07/2024

## 2024-07-08 RX ORDER — LISDEXAMFETAMINE DIMESYLATE 40 MG/1
40 CAPSULE ORAL DAILY
Qty: 30 CAPSULE | Refills: 0 | Status: SHIPPED | OUTPATIENT
Start: 2024-07-08

## 2024-08-06 DIAGNOSIS — F90.0 ATTENTION DEFICIT HYPERACTIVITY DISORDER (ADHD), PREDOMINANTLY INATTENTIVE TYPE: ICD-10-CM

## 2024-08-07 RX ORDER — LISDEXAMFETAMINE DIMESYLATE 40 MG/1
40 CAPSULE ORAL DAILY
Qty: 30 CAPSULE | Refills: 0 | Status: SHIPPED | OUTPATIENT
Start: 2024-08-07

## 2024-08-12 DIAGNOSIS — F90.0 ATTENTION DEFICIT HYPERACTIVITY DISORDER (ADHD), PREDOMINANTLY INATTENTIVE TYPE: ICD-10-CM

## 2024-08-12 RX ORDER — LISDEXAMFETAMINE DIMESYLATE 40 MG/1
40 CAPSULE ORAL DAILY
Qty: 30 CAPSULE | Refills: 0 | Status: SHIPPED | OUTPATIENT
Start: 2024-08-12

## 2024-08-12 NOTE — TELEPHONE ENCOUNTER
No care due was identified.  Health [facility] Embedded Care Due Messages. Reference number: [id number].   8/12/2024 12:42:10 PM CDT

## 2024-09-09 ENCOUNTER — PATIENT MESSAGE (OUTPATIENT)
Dept: FAMILY MEDICINE | Facility: CLINIC | Age: 29
End: 2024-09-09
Payer: COMMERCIAL

## 2024-09-09 DIAGNOSIS — F90.0 ATTENTION DEFICIT HYPERACTIVITY DISORDER (ADHD), PREDOMINANTLY INATTENTIVE TYPE: ICD-10-CM

## 2024-09-09 RX ORDER — LISDEXAMFETAMINE DIMESYLATE 40 MG/1
40 CAPSULE ORAL DAILY
Qty: 30 CAPSULE | Refills: 0 | Status: SHIPPED | OUTPATIENT
Start: 2024-09-09 | End: 2024-09-09 | Stop reason: SDUPTHER

## 2024-09-09 NOTE — TELEPHONE ENCOUNTER
No care due was identified.  Health [facility] Embedded Care Due Messages. Reference number: [id number].   9/09/2024 4:26:22 PM CDT

## 2024-09-09 NOTE — TELEPHONE ENCOUNTER
No care due was identified.  [facility] Embedded Care Due Messages. Reference number: [id number].   9/09/2024 2:08:14 PM CDT

## 2024-09-10 RX ORDER — LISDEXAMFETAMINE DIMESYLATE 40 MG/1
40 CAPSULE ORAL DAILY
Qty: 30 CAPSULE | Refills: 0 | Status: SHIPPED | OUTPATIENT
Start: 2024-09-10

## 2024-10-09 DIAGNOSIS — F90.0 ATTENTION DEFICIT HYPERACTIVITY DISORDER (ADHD), PREDOMINANTLY INATTENTIVE TYPE: ICD-10-CM

## 2024-10-09 RX ORDER — LISDEXAMFETAMINE DIMESYLATE 40 MG/1
40 CAPSULE ORAL DAILY
Qty: 30 CAPSULE | Refills: 0 | Status: SHIPPED | OUTPATIENT
Start: 2024-10-09

## 2024-10-09 NOTE — TELEPHONE ENCOUNTER
No care due was identified.  Health [facility] Embedded Care Due Messages. Reference number: [id number].   10/09/2024 8:57:57 AM CDT

## 2024-11-13 DIAGNOSIS — F90.0 ATTENTION DEFICIT HYPERACTIVITY DISORDER (ADHD), PREDOMINANTLY INATTENTIVE TYPE: ICD-10-CM

## 2024-11-13 RX ORDER — LISDEXAMFETAMINE DIMESYLATE 40 MG/1
40 CAPSULE ORAL DAILY
Qty: 30 CAPSULE | Refills: 0 | Status: SHIPPED | OUTPATIENT
Start: 2024-11-13

## 2024-11-13 NOTE — TELEPHONE ENCOUNTER
No care due was identified.  Health [facility] Embedded Care Due Messages. Reference number: [id number].   11/13/2024 10:48:17 AM CST

## 2024-11-26 ENCOUNTER — TELEPHONE (OUTPATIENT)
Dept: FAMILY MEDICINE | Facility: CLINIC | Age: 29
End: 2024-11-26
Payer: COMMERCIAL

## 2024-11-26 NOTE — TELEPHONE ENCOUNTER
----- Message from Min sent at 11/26/2024  2:31 PM CST -----   Name of Who is Calling:     What is the request in detail: patient calling in reference to today's scheduled virtual appointment / patient has been on since 2pm want to know if doctor will join call   Please contact to further discuss and advise      Can the clinic reply by [facility]:     What Number to Call Back if not in [facility]:   [phone]

## 2024-12-06 ENCOUNTER — LAB VISIT (OUTPATIENT)
Dept: LAB | Facility: HOSPITAL | Age: 29
End: 2024-12-06
Attending: FAMILY MEDICINE
Payer: COMMERCIAL

## 2024-12-06 ENCOUNTER — OFFICE VISIT (OUTPATIENT)
Dept: FAMILY MEDICINE | Facility: CLINIC | Age: 29
End: 2024-12-06
Payer: COMMERCIAL

## 2024-12-06 DIAGNOSIS — F90.0 ATTENTION DEFICIT HYPERACTIVITY DISORDER (ADHD), PREDOMINANTLY INATTENTIVE TYPE: ICD-10-CM

## 2024-12-06 DIAGNOSIS — Z00.00 ANNUAL PHYSICAL EXAM: ICD-10-CM

## 2024-12-06 DIAGNOSIS — F90.0 ATTENTION DEFICIT HYPERACTIVITY DISORDER (ADHD), PREDOMINANTLY INATTENTIVE TYPE: Primary | ICD-10-CM

## 2024-12-06 DIAGNOSIS — Z91.09 ENVIRONMENTAL ALLERGIES: ICD-10-CM

## 2024-12-06 LAB
AMPHET+METHAMPHET UR QL: ABNORMAL
BARBITURATES UR QL SCN>200 NG/ML: NEGATIVE
BENZODIAZ UR QL SCN>200 NG/ML: NEGATIVE
BZE UR QL SCN: NEGATIVE
CANNABINOIDS UR QL SCN: NEGATIVE
CREAT UR-MCNC: 109 MG/DL (ref 15–325)
ETHANOL UR-MCNC: <10 MG/DL
METHADONE UR QL SCN>300 NG/ML: NEGATIVE
OPIATES UR QL SCN: NEGATIVE
PCP UR QL SCN>25 NG/ML: NEGATIVE
TOXICOLOGY INFORMATION: ABNORMAL

## 2024-12-06 PROCEDURE — 80307 DRUG TEST PRSMV CHEM ANLYZR: CPT | Performed by: FAMILY MEDICINE

## 2024-12-06 RX ORDER — LEVOCETIRIZINE DIHYDROCHLORIDE 5 MG/1
5 TABLET, FILM COATED ORAL NIGHTLY
Qty: 30 TABLET | Refills: 11 | Status: SHIPPED | OUTPATIENT
Start: 2024-12-06 | End: 2025-12-06

## 2024-12-06 RX ORDER — DEXTROAMPHETAMINE SACCHARATE, AMPHETAMINE ASPARTATE, DEXTROAMPHETAMINE SULFATE AND AMPHETAMINE SULFATE 1.25; 1.25; 1.25; 1.25 MG/1; MG/1; MG/1; MG/1
5 TABLET ORAL
Qty: 30 TABLET | Refills: 0 | Status: SHIPPED | OUTPATIENT
Start: 2024-12-06

## 2024-12-06 NOTE — PROGRESS NOTES
Assessment & Plan:    Attention deficit hyperactivity disorder (ADHD), predominantly inattentive type  -     dextroamphetamine-amphetamine (ADDERALL) 5 mg Tab; Take 5 mg by mouth after lunch.  Dispense: 30 tablet; Refill: 0  -     Toxicology Screen, Urine; Future    Add 5 mg Adderall in the afternoon. May increase to 10 mg if needed after a week.  UDS to be submitted before next refill is due.    Environmental allergies  -     levocetirizine (XYZAL) 5 MG tablet; Take 1 tablet (5 mg total) by mouth every evening.  Dispense: 30 tablet; Refill: 11    Patient informed of possibility of tolerance to an antihistamine if she has been taking it for a while. Trial of Xyzal for a few months. May switch back to Zyrtec after this time frame if needed.    Annual physical exam  -     CBC Auto Differential; Future; Expected date: 12/06/2024  -     Comprehensive Metabolic Panel; Future; Expected date: 12/06/2024  -     Hemoglobin A1C; Future; Expected date: 12/06/2024  -     Lipid Panel; Future; Expected date: 12/06/2024      Due for annual labs and visit in 6 months.       The patient location is:  Patient Home   The chief complaint leading to consultation is: noted below  Visit type: Virtual visit with synchronous audio and video  Total time spent with patient: 8 minutes  Each patient to whom he or she provides medical services by telemedicine is:  (1) informed of the relationship between the physician and patient and the respective role of any other health care provider with respect to management of the patient; and (2) notified that he or she may decline to receive medical services by telemedicine and may withdraw from such care at any time.    Follow-up: Follow up in about 6 months (around 6/6/2025) for annual labs and visit.    ______________________________________________________________________    Chief Complaint  Chief Complaint   Patient presents with    ADHD       HPI  Melindawaleska Mccann is a 29 y.o. female with  multiple medical diagnoses as listed in the medical history and problem list that presents in a virtual visit to follow up on ADHD. ADHD symptoms are well controlled in the morning with Vyvanse 40 mg but she is finding that it wears off at about 1 pm. 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     Patient has also been experiencing environmental allergies, refractory to Zyrtec and Flonase.       PAST MEDICAL HISTORY:  Past Medical History:   Diagnosis Date    Accessory navicular bone of foot     right    ADD (attention deficit disorder)     Cancer     Dysplastic nevus     Melanoma     Still's murmur        PAST SURGICAL HISTORY:  Past Surgical History:   Procedure Laterality Date    COLONOSCOPY N/A 5/17/2016    Procedure: COLONOSCOPY;  Surgeon: ROSY Ferguson MD;  Location: [address];  Service: Endoscopy;  Laterality: N/A;    WISDOM TOOTH EXTRACTION         SOCIAL HISTORY:  Social History     Socioeconomic History    Marital status: Single   Occupational History    Occupation: hospital scribe   Tobacco Use    Smoking status: Never    Smokeless tobacco: Never   Substance and Sexual Activity    Alcohol use: Yes     Alcohol/week: 4.0 standard drinks of alcohol     Types: 4 Glasses of wine per week    Drug use: No    Sexual activity: Yes     Partners: Male     Birth control/protection: Implant   Other Topics Concern    Are you pregnant or think you may be? No    Breast-feeding No     Social Drivers of Health     Financial Resource Strain: Low Risk  (11/26/2024)    Overall Financial Resource Strain (CARDIA)     Difficulty of Paying Living Expenses: Not very hard   Food Insecurity: No Food Insecurity (11/26/2024)    Hunger Vital Sign     Worried About Running Out of Food in the Last Year: Never true     Ran Out of Food in the Last Year: Never true   Physical Activity: Sufficiently  Active (11/26/2024)    Exercise Vital Sign     Days of Exercise per Week: 5 days     Minutes of Exercise per Session: 40 min   Stress: Stress Concern Present (11/26/2024)    [employer] [place] [employer] - Occupational Stress Questionnaire     Feeling of Stress : To some extent   Housing Stability: Unknown (11/26/2024)    Housing Stability Vital Sign     Unable to Pay for Housing in the Last Year: No       FAMILY HISTORY:  Family History   Problem Relation Name Age of Onset    Mitral valve prolapse Mother      Hyperlipidemia Mother      Osteoporosis Mother      Stroke Maternal Aunt          maternal great aunt    Cirrhosis Paternal Uncle      Breast cancer Neg Hx      Colon cancer Neg Hx      Ovarian cancer Neg Hx      Diabetes Neg Hx      Hypertension Neg Hx      Melanoma Neg Hx      Esophageal cancer Neg Hx         ALLERGIES AND MEDICATIONS: updated and reviewed.  Review of patient's allergies indicates:  No Known Allergies  Current Outpatient Medications   Medication Sig Dispense Refill    ammonium lactate 12 % Crea Qam and qafternoon to bad nails 140 g 3    ciclopirox (PENLAC) 8 % Soln Apply topically nightly. To bad nails 6.6 mL 6    dextroamphetamine-amphetamine (ADDERALL) 5 mg Tab Take 5 mg by mouth after lunch. 30 tablet 0    fluconazole (DIFLUCAN) 150 MG Tab Take 150 mg by mouth.      levocetirizine (XYZAL) 5 MG tablet Take 1 tablet (5 mg total) by mouth every evening. 30 tablet 11    lisdexamfetamine (VYVANSE) 40 MG Cap Take 1 capsule (40 mg total) by mouth once daily. 30 capsule 0    lisdexamfetamine (VYVANSE) 40 MG Cap Take 1 capsule (40 mg total) by mouth once daily. 30 capsule 0    multivitamin (THERAGRAN) per tablet Take 1 tablet by mouth once daily.      norgestimate-ethinyl estradioL (ORTHO-CYCLEN) 0.25-35 mg-mcg per tablet Take 1 tablet by mouth once daily. To be taken continuously, skip placebo pills 28 tablet 1    tretinoin (RETIN-A) 0.05 % cream APPLY EXTERNALLY TO THE AFFECTED AREA  EVERY NIGHT 20 g 1     Current Facility-Administered Medications   Medication Dose Route Frequency Provider Last Rate Last Admin    levonorgestrel 17.5 mcg/24 hrs (5 yrs) 19.5 mg IUD 17.5 mcg  17.5 mcg Intrauterine  Band, Marleni DIANA DO   17.5 mcg at 07/24/19 1358         ROS  Review of Systems   Constitutional:  Negative for activity change and unexpected weight change.   HENT:  Negative for hearing loss, rhinorrhea and trouble swallowing.    Eyes:  Negative for discharge and visual disturbance.   Respiratory:  Negative for chest tightness and wheezing.    Cardiovascular:  Negative for chest pain and palpitations.   Gastrointestinal:  Negative for blood in stool, constipation, diarrhea and vomiting.   Endocrine: Negative for polydipsia and polyuria.   Genitourinary:  Negative for difficulty urinating, dysuria, hematuria and menstrual problem.   Musculoskeletal:  Negative for arthralgias, joint swelling and neck pain.   Allergic/Immunologic: Positive for environmental allergies.   Neurological:  Negative for weakness and headaches.   Psychiatric/Behavioral:  Positive for decreased concentration. Negative for confusion and dysphoric mood.            Physical Exam  Physical Exam  Constitutional:       General: She is not in acute distress.  HENT:      Head: Normocephalic and atraumatic.   Neurological:      Mental Status: She is alert. Mental status is at baseline.   Psychiatric:         Mood and Affect: Mood normal.         Behavior: Behavior normal.         *Physical exam limited by virtual visit.    Health Maintenance         Date Due Completion Date    Pneumococcal Vaccines (Age 0-64) (1 of 2 - PCV) Never done ---    Pap Smear 05/09/2020 5/9/2017    COVID-19 Vaccine (5 - 2024-25 season) 09/01/2024 11/17/2022    TETANUS VACCINE 05/15/2028 5/15/2018    RSV Vaccine (Age 60+ and Pregnant patients) (1 - 1-dose 75+ series) 04/08/2071 ---

## 2024-12-16 DIAGNOSIS — F90.0 ATTENTION DEFICIT HYPERACTIVITY DISORDER (ADHD), PREDOMINANTLY INATTENTIVE TYPE: ICD-10-CM

## 2024-12-16 NOTE — TELEPHONE ENCOUNTER
No care due was identified.  Health [facility] Embedded Care Due Messages. Reference number: [id number].   12/16/2024 8:00:50 AM CST

## 2024-12-17 DIAGNOSIS — F90.0 ATTENTION DEFICIT HYPERACTIVITY DISORDER (ADHD), PREDOMINANTLY INATTENTIVE TYPE: ICD-10-CM

## 2024-12-17 RX ORDER — LISDEXAMFETAMINE DIMESYLATE 40 MG/1
40 CAPSULE ORAL DAILY
Qty: 30 CAPSULE | Refills: 0 | Status: SHIPPED | OUTPATIENT
Start: 2024-12-17 | End: 2024-12-17 | Stop reason: SDUPTHER

## 2024-12-17 RX ORDER — LISDEXAMFETAMINE DIMESYLATE 40 MG/1
40 CAPSULE ORAL DAILY
Qty: 30 CAPSULE | Refills: 0 | Status: SHIPPED | OUTPATIENT
Start: 2024-12-17

## 2024-12-17 NOTE — TELEPHONE ENCOUNTER
No care due was identified.  Health [facility] Embedded Care Due Messages. Reference number: [id number].   12/17/2024 8:05:06 AM CST

## 2024-12-17 NOTE — TELEPHONE ENCOUNTER
Refill Routing Note   Medication(s) are not appropriate for processing by [facility] for the following reason(s):        Outside of protocol    [facility] action(s):  Route             Appointments  past 12m or future 3m with PCP    Date Provider   Last Visit   12/6/2024 Deborah Levi, DO   Next Visit   Visit date not found Deborah Levi, DO   ED visits in past 90 days: 0        Note composed:9:57 PM 12/16/2024

## 2025-01-08 DIAGNOSIS — F90.0 ATTENTION DEFICIT HYPERACTIVITY DISORDER (ADHD), PREDOMINANTLY INATTENTIVE TYPE: ICD-10-CM

## 2025-01-08 NOTE — TELEPHONE ENCOUNTER
No care due was identified.  [facility] Embedded Care Due Messages. Reference number: [id number].   1/08/2025 3:35:51 PM CST

## 2025-01-09 RX ORDER — DEXTROAMPHETAMINE SACCHARATE, AMPHETAMINE ASPARTATE, DEXTROAMPHETAMINE SULFATE AND AMPHETAMINE SULFATE 1.25; 1.25; 1.25; 1.25 MG/1; MG/1; MG/1; MG/1
5 TABLET ORAL
Qty: 30 TABLET | Refills: 0 | Status: SHIPPED | OUTPATIENT
Start: 2025-01-09

## 2025-01-17 DIAGNOSIS — F90.0 ATTENTION DEFICIT HYPERACTIVITY DISORDER (ADHD), PREDOMINANTLY INATTENTIVE TYPE: ICD-10-CM

## 2025-01-17 RX ORDER — LISDEXAMFETAMINE DIMESYLATE 40 MG/1
40 CAPSULE ORAL DAILY
Qty: 30 CAPSULE | Refills: 0 | Status: SHIPPED | OUTPATIENT
Start: 2025-01-17

## 2025-01-17 NOTE — TELEPHONE ENCOUNTER
No care due was identified.  Health [facility] Embedded Care Due Messages. Reference number: [id number].   1/17/2025 9:39:26 AM CST

## 2025-02-10 DIAGNOSIS — F90.0 ATTENTION DEFICIT HYPERACTIVITY DISORDER (ADHD), PREDOMINANTLY INATTENTIVE TYPE: ICD-10-CM

## 2025-02-10 RX ORDER — DEXTROAMPHETAMINE SACCHARATE, AMPHETAMINE ASPARTATE, DEXTROAMPHETAMINE SULFATE AND AMPHETAMINE SULFATE 1.25; 1.25; 1.25; 1.25 MG/1; MG/1; MG/1; MG/1
5 TABLET ORAL
Qty: 30 TABLET | Refills: 0 | Status: SHIPPED | OUTPATIENT
Start: 2025-02-10

## 2025-02-10 NOTE — TELEPHONE ENCOUNTER
No care due was identified.  [facility] Embedded Care Due Messages. Reference number: [id number].   2/10/2025 12:15:53 PM CST

## 2025-02-17 DIAGNOSIS — F90.0 ATTENTION DEFICIT HYPERACTIVITY DISORDER (ADHD), PREDOMINANTLY INATTENTIVE TYPE: ICD-10-CM

## 2025-02-17 NOTE — TELEPHONE ENCOUNTER
No care due was identified.  Health [facility] Embedded Care Due Messages. Reference number: [id number].   2/17/2025 9:14:53 AM CST

## 2025-02-17 NOTE — TELEPHONE ENCOUNTER
No care due was identified.  Health [facility] Embedded Care Due Messages. Reference number: [id number].   2/17/2025 9:16:47 AM CST

## 2025-02-18 ENCOUNTER — PATIENT MESSAGE (OUTPATIENT)
Dept: FAMILY MEDICINE | Facility: CLINIC | Age: 30
End: 2025-02-18
Payer: COMMERCIAL

## 2025-02-18 RX ORDER — LISDEXAMFETAMINE DIMESYLATE 40 MG/1
40 CAPSULE ORAL DAILY
Qty: 30 CAPSULE | Refills: 0 | Status: SHIPPED | OUTPATIENT
Start: 2025-02-18

## 2025-02-18 RX ORDER — DEXTROAMPHETAMINE SACCHARATE, AMPHETAMINE ASPARTATE, DEXTROAMPHETAMINE SULFATE AND AMPHETAMINE SULFATE 1.25; 1.25; 1.25; 1.25 MG/1; MG/1; MG/1; MG/1
5 TABLET ORAL
Qty: 30 TABLET | Refills: 0 | Status: SHIPPED | OUTPATIENT
Start: 2025-02-18

## 2025-03-19 DIAGNOSIS — F90.0 ATTENTION DEFICIT HYPERACTIVITY DISORDER (ADHD), PREDOMINANTLY INATTENTIVE TYPE: ICD-10-CM

## 2025-03-19 NOTE — TELEPHONE ENCOUNTER
No care due was identified.  Health [facility] Embedded Care Due Messages. Reference number: [id number].   3/19/2025 8:40:33 AM CDT

## 2025-03-20 RX ORDER — LISDEXAMFETAMINE DIMESYLATE 40 MG/1
40 CAPSULE ORAL DAILY
Qty: 30 CAPSULE | Refills: 0 | Status: SHIPPED | OUTPATIENT
Start: 2025-03-20

## 2025-03-20 RX ORDER — DEXTROAMPHETAMINE SACCHARATE, AMPHETAMINE ASPARTATE, DEXTROAMPHETAMINE SULFATE AND AMPHETAMINE SULFATE 1.25; 1.25; 1.25; 1.25 MG/1; MG/1; MG/1; MG/1
5 TABLET ORAL
Qty: 30 TABLET | Refills: 0 | Status: SHIPPED | OUTPATIENT
Start: 2025-03-20

## 2025-03-26 NOTE — PROGRESS NOTES
ALLERGY & IMMUNOLOGY CLINIC - INITIAL CONSULTATION      HISTORY OF PRESENT ILLNESS     Patient ID: Melinda Mccann is a 29 y.o. female    CC: allergic rhinoconjunctivitis     HPI: Melinda Mccann is a 29 y.o. female presenting for allergic rhinoconjunctivitis.    Symptoms worsening since college. While in college in [place], had skin testing with multiple positives. But decided against AIT as she would be moving back to [place] soon. And symptoms continue to be bothersome for the past several years in [place].   Patient endorses congestion, sneezing, rhinorrhea, post-nasal drip, cough (when symptoms get bad), ocular pruritus. Watery eyes. Sinus pressure/pain. Sometimes throat feels a little swollen.   Symptoms are perennial. Symptoms are worse in spring and fall.   Symptoms occur every day since October. Symptoms worsened about 1.5 months ago.   Watery eyes worse outdoors, but there doesn't seem to be a difference between indoors and outdoors for her rhinitis.   The patient has found triggers to include: pollen, cats, dust.   The patient denies heartburn/reflux symptoms.  The patient endorses intermittent hyposmia. No history of polyps.     Rotates between zyrtec and xyzal.   She is using flonase 2 SEN daily.   She uses saline spray.  Occasional afrin (she knows not to use consistently).   She tried azelastine nasal spray in past, didn't find it helpful (but hasn't tried in combo with flonase).   Doesn't use antihistamine eye drops (but says the ocular symptoms are usually short-lived).    No shortness of breath or wheezing. Except when she feels the throat swelling. This happens 2-3 times per spring and again in fall. When it happens she tries to hold her jaw a differnet way so she can breathe easier. And she taks a benadryl, which helps. It doesn't occur in relation to food. Its just when her environmental allergies get bad.      MEDICAL HISTORY     Vaccines:   Immunization History   Administered  Date(s) Administered    COVID-19, MRNA, LN-S, PF (MODERNA FULL 0.5 ML DOSE) 01/18/2021, 02/17/2021, 09/14/2021    COVID-19, mRNA, LNP-S, bivalent booster, PF (PFIZER OMICRON) 11/17/2022    DTaP 1996, 1996, 1996, 08/12/1997, 07/30/2001    HIB 1996, 1996, 1996, 08/12/1997    HPV Quadrivalent 08/14/2007, 11/02/2007, 03/24/2008    Hepatitis B, Adult 05/15/2018, 06/19/2018, 11/23/2018    Hepatitis B, Pediatric/Adolescent 1996, 1996, 01/24/1997    Influenza 11/04/2005, 10/18/2006, 09/26/2007, 10/22/2008, 10/21/2009, 10/23/2011, 10/12/2012, 10/21/2013, 09/28/2014    Influenza - Quadrivalent - MDCK - PF 10/26/2022    Influenza - Quadrivalent - PF *Preferred* (6 months and older) 10/12/2012, 10/21/2013, 09/28/2014, 12/09/2016, 11/23/2018, 10/14/2021, 10/26/2023    Influenza - Trivalent (PED) 10/23/2011, 10/21/2013    Influenza - Trivalent - Afluria, Fluzone MDV 11/04/2005, 10/18/2006, 09/26/2007, 10/22/2008, 10/21/2009, 10/12/2012, 09/28/2014    Influenza Split 10/12/2012    MMR 04/30/1997, 07/30/2001    Meningococcal Conjugate 07/17/2012    Meningococcal Conjugate (MCV4P) 08/14/2007, 07/17/2012    OPV 1996, 1996, 1996, 07/30/2001    PPD Test 06/30/2014, 07/03/2014, 05/31/2022, 06/09/2022    Tdap 08/14/2007, 08/14/2007, 05/15/2018    Varicella 04/30/1997, 07/13/2009     Medical Hx:   Problem List[1]    Surgical Hx:   Past Surgical History:   Procedure Laterality Date    COLONOSCOPY N/A 5/17/2016    Procedure: COLONOSCOPY;  Surgeon: ROSY Ferguson MD;  Location: [address]);  Service: Endoscopy;  Laterality: N/A;    WISDOM TOOTH EXTRACTION       Medications:   Medications Ordered Prior to Encounter[2]    H/o Asthma: denies  H/o Rhinitis: endorses    Drug Allergies: Review of patient's allergies indicates:  No Known Allergies    Env/Occ:   Pets: no  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 She plans on staying in [place] after graduation.  "    Social Hx:   Social History[3]    Family Hx:   Family History   Problem Relation Name Age of Onset    Allergies Mother      Mitral valve prolapse Mother      Hyperlipidemia Mother      Osteoporosis Mother      Allergies Father      Allergies Brother      Allergies Brother      Stroke Maternal Aunt          maternal great aunt    Cirrhosis Paternal Uncle      Allergies Paternal Grandmother      Breast cancer Neg Hx      Colon cancer Neg Hx      Ovarian cancer Neg Hx      Diabetes Neg Hx      Hypertension Neg Hx      Melanoma Neg Hx      Esophageal cancer Neg Hx        PHYSICAL EXAM     VS: Ht 5' 7" (1.702 m)   Wt 57.6 kg (126 lb 15.8 oz)   BMI 19.89 kg/m²   GENERAL: Alert, NAD, well-appearing  EYES: EOMI, no conjunctival injection, no discharge, no infraorbital shiners  NOSE: NT 2+ B/L, no stringing mucus, no polyps visualized  ORAL: MMM, no ulcers, no thrush, no cobblestoning  LUNGS: CTAB, no w/r/c, no increased WOB  HEART: RRR, normal S1/S2, no m/g/r  DERM: no rashes  NEURO: normal speech, normal gait, no facial asymmetry     LABORATORY STUDIES     Component      Latest Ref [facility] 5/10/2024   WBC      3.90 - 12.70 K/uL 4.42    RBC      4.00 - 5.40 M/uL 4.65    Hemoglobin      12.0 - 16.0 g/dL 14.3    Hematocrit      37.0 - 48.5 % 43.3    MCV      82 - 98 fL 93    MCH      27.0 - 31.0 pg 30.8    MCHC      32.0 - 36.0 g/dL 33.0    RDW      11.5 - 14.5 % 11.9    Platelet Count      150 - 450 K/uL 195    MPV      9.2 - 12.9 fL 11.4    Immature Granulocytes      0.0 - 0.5 % 0.2    Gran # (ANC)      1.8 - 7.7 K/uL 2.2    Immature Grans (Abs)      0.00 - 0.04 K/uL 0.01    Lymph #      1.0 - 4.8 K/uL 1.6    Mono #      0.3 - 1.0 K/uL 0.3    Eos #      0.0 - 0.5 K/uL 0.2    Baso #      0.00 - 0.20 K/uL 0.03    Differential Method Automated    Sodium      136 - 145 mmol/L 138    Potassium      3.5 - 5.1 mmol/L 4.3    Chloride      95 - 110 mmol/L 105    CO2      23 - 29 mmol/L 26    Glucose      70 - 110 mg/dL 99    BUN   "    6 - 20 mg/dL 15    Creatinine      0.5 - 1.4 mg/dL 0.7    Calcium      8.7 - 10.5 mg/dL 9.8    PROTEIN TOTAL      6.0 - 8.4 g/dL 6.6    Albumin      3.5 - 5.2 g/dL 4.2    BILIRUBIN TOTAL      0.1 - 1.0 mg/dL 2.4 (H)    ALP      55 - 135 U/L 65    AST      10 - 40 U/L 14    ALT      10 - 44 U/L 8 (L)    eGFR      >60 mL/min/1.73 m^2 >60.0    Anion Gap      8 - 16 mmol/L 7 (L)    Hemoglobin A1C External      4.0 - 5.6 % 5.2       ALLERGEN TESTING     Immunocaps: Ordered     CHART REVIEW     Reviewed pcp note, labs.     ASSESSMENT & PLAN     Melinda Mccann is a 29 y.o. female with     # Chronic allergic rhinoconjunctivitis: Patient recalls allergy testing in college with multiple positives. Symptoms perennial, but worse in spring and fall. Not optimally controlled.  -immunocaps for aeroallergens ordered.  -continue cetirizine or levocetirizine daily.  -continue flonase, can use 1-2 SEN BID.  -start azelastine nasal spray 1-2 SEN BID.  -if symptoms still bothersome after a week of the above regimen, trial montelukast 10 mg nightly. Counseled on boxed warning for neuropsychiatric side effects.   -we discussed option of allergen immunotherapy injections today, which patient is potentially interested in. Provided her with some information so that she can inquire about cost. Will further discuss option of AIT at next visit, especially if symptoms aren't controlled.     # Allergic pharyngeal symptoms: When her allergic rhinitis is at its worse, patient can also get sensation of throat swelling, which can affect her breathing (she reports this happens 4-6 times per year, in spring and fall). Helped by benadryl.  -treating allergic rhinitis as above.  -advised that when symptoms occur, can take an additional cetirizine or levocetirizine (especially if trying to avoid drowsiness from benadryl).       Follow up: 1-2 months     I spent a total of 50 minutes on the day of the visit.  This includes face to face time and  non-face to face time preparing to see the patient (eg, review of tests), obtaining and/or reviewing separately obtained history, documenting clinical information in the electronic or other health record.    Angeli Colon MD  Allergy/Immunology         [1]   Patient Active Problem List  Diagnosis    Accessory navicular bone of foot    Dysmenorrhea in the adolescent    ADHD (attention deficit hyperactivity disorder), inattentive type    Hyperbilirubinemia    Liver hemangioma    Gilbert's syndrome    Encounter for well woman exam with routine gynecological exam    Irregular menstrual bleeding   [2]   Current Outpatient Medications on File Prior to Visit   Medication Sig Dispense Refill    ammonium lactate 12 % Crea Qam and qafternoon to bad nails 140 g 3    ciclopirox (PENLAC) 8 % Soln Apply topically nightly. To bad nails 6.6 mL 6    dextroamphetamine-amphetamine (ADDERALL) 5 mg Tab Take 5 mg by mouth after lunch. 30 tablet 0    levocetirizine (XYZAL) 5 MG tablet Take 1 tablet (5 mg total) by mouth every evening. 30 tablet 11    lisdexamfetamine (VYVANSE) 40 MG Cap Take 1 capsule (40 mg total) by mouth once daily. 30 capsule 0    multivitamin (THERAGRAN) per tablet Take 1 tablet by mouth once daily.      norgestimate-ethinyl estradioL (ORTHO-CYCLEN) 0.25-35 mg-mcg per tablet Take 1 tablet by mouth once daily. To be taken continuously, skip placebo pills 28 tablet 1    tretinoin (RETIN-A) 0.05 % cream APPLY EXTERNALLY TO THE AFFECTED AREA EVERY NIGHT 20 g 1    fluconazole (DIFLUCAN) 150 MG Tab Take 150 mg by mouth. (Patient not taking: Reported on 3/27/2025)       Current Facility-Administered Medications on File Prior to Visit   Medication Dose Route Frequency Provider Last Rate Last Admin    levonorgestrel 17.5 mcg/24 hrs (5 yrs) 19.5 mg IUD 17.5 mcg  17.5 mcg Intrauterine  Marleni Armas DO   17.5 mcg at 07/24/19 0175   [3]   Social History  Tobacco Use    Smoking status: Never    Smokeless tobacco: Never    Substance Use Topics    Alcohol use: Yes     Alcohol/week: 4.0 standard drinks of alcohol     Types: 4 Glasses of wine per week    Drug use: No

## 2025-03-27 ENCOUNTER — OFFICE VISIT (OUTPATIENT)
Dept: ALLERGY | Facility: CLINIC | Age: 30
End: 2025-03-27
Payer: COMMERCIAL

## 2025-03-27 ENCOUNTER — LAB VISIT (OUTPATIENT)
Dept: LAB | Facility: HOSPITAL | Age: 30
End: 2025-03-27
Attending: STUDENT IN AN ORGANIZED HEALTH CARE EDUCATION/TRAINING PROGRAM
Payer: COMMERCIAL

## 2025-03-27 VITALS — BODY MASS INDEX: 19.93 KG/M2 | HEIGHT: 67 IN | WEIGHT: 127 LBS

## 2025-03-27 DIAGNOSIS — J30.9 CHRONIC ALLERGIC RHINITIS: ICD-10-CM

## 2025-03-27 DIAGNOSIS — J39.2 PHARYNGEAL IRRITATION: ICD-10-CM

## 2025-03-27 DIAGNOSIS — J30.9 CHRONIC ALLERGIC RHINITIS: Primary | ICD-10-CM

## 2025-03-27 DIAGNOSIS — H10.13 ALLERGIC CONJUNCTIVITIS OF BOTH EYES: ICD-10-CM

## 2025-03-27 PROCEDURE — 86003 ALLG SPEC IGE CRUDE XTRC EA: CPT | Mod: 59

## 2025-03-27 PROCEDURE — 36415 COLL VENOUS BLD VENIPUNCTURE: CPT | Mod: PO

## 2025-03-27 PROCEDURE — 1160F RVW MEDS BY RX/DR IN RCRD: CPT | Mod: CPTII,S$GLB,, | Performed by: STUDENT IN AN ORGANIZED HEALTH CARE EDUCATION/TRAINING PROGRAM

## 2025-03-27 PROCEDURE — 1159F MED LIST DOCD IN RCRD: CPT | Mod: CPTII,S$GLB,, | Performed by: STUDENT IN AN ORGANIZED HEALTH CARE EDUCATION/TRAINING PROGRAM

## 2025-03-27 PROCEDURE — 86003 ALLG SPEC IGE CRUDE XTRC EA: CPT

## 2025-03-27 PROCEDURE — 3008F BODY MASS INDEX DOCD: CPT | Mod: CPTII,S$GLB,, | Performed by: STUDENT IN AN ORGANIZED HEALTH CARE EDUCATION/TRAINING PROGRAM

## 2025-03-27 PROCEDURE — 99999 PR PBB SHADOW E&M-EST. PATIENT-LVL III: CPT | Mod: PBBFAC,,, | Performed by: STUDENT IN AN ORGANIZED HEALTH CARE EDUCATION/TRAINING PROGRAM

## 2025-03-27 PROCEDURE — 99204 OFFICE O/P NEW MOD 45 MIN: CPT | Mod: S$GLB,,, | Performed by: STUDENT IN AN ORGANIZED HEALTH CARE EDUCATION/TRAINING PROGRAM

## 2025-03-27 RX ORDER — AZELASTINE 1 MG/ML
SPRAY, METERED NASAL
Qty: 30 ML | Refills: 11 | Status: SHIPPED | OUTPATIENT
Start: 2025-03-27

## 2025-03-27 RX ORDER — MONTELUKAST SODIUM 10 MG/1
10 TABLET ORAL NIGHTLY
Qty: 30 TABLET | Refills: 11 | Status: SHIPPED | OUTPATIENT
Start: 2025-03-27

## 2025-03-27 NOTE — PATIENT INSTRUCTIONS
Allergen Immunotherapy Injection Cost Info:  The phone number for [facility] Pricing Office is [phone] (option #2).   These codes are estimates based on a 2-vial prescription with rush build-up (although I am not sure exactly how many vials you would have, and I'm not sure which build-up option you would choose):    95165 x 80 units (for the prescription of the vials themselves).  95180 x 4 units (for the rush procedure).  82907 billed 1 unit each week for about 18 weeks for the rest of build-up, and then monthly for the remainder of the 3-5 years of maintenance (for the individual injection visits).   And then 23250 billed again X 40 units about once per year for refills of the maintenance vials.    Let them know they will be performed in clinic. There may be a different price for first year and subsequent years. You can also request estimates on [employer], for example if you aren't able to call during their hours of 9-3pm.

## 2025-03-31 LAB — AMER SYCAMORE IGE QN: <0.1 KU/L

## 2025-04-01 DIAGNOSIS — L57.8 SOLAR AGING OF SKIN: ICD-10-CM

## 2025-04-01 RX ORDER — TRETINOIN 0.5 MG/G
CREAM TOPICAL
Qty: 20 G | Refills: 1 | Status: SHIPPED | OUTPATIENT
Start: 2025-04-01

## 2025-04-09 ENCOUNTER — PATIENT MESSAGE (OUTPATIENT)
Dept: ALLERGY | Facility: CLINIC | Age: 30
End: 2025-04-09
Payer: COMMERCIAL

## 2025-04-10 ENCOUNTER — PATIENT MESSAGE (OUTPATIENT)
Facility: CLINIC | Age: 30
End: 2025-04-10
Payer: COMMERCIAL

## 2025-04-21 DIAGNOSIS — F90.0 ATTENTION DEFICIT HYPERACTIVITY DISORDER (ADHD), PREDOMINANTLY INATTENTIVE TYPE: ICD-10-CM

## 2025-04-21 NOTE — TELEPHONE ENCOUNTER
No care due was identified.  [facility] Embedded Care Due Messages. Reference number: [id number].   4/21/2025 10:47:26 AM CDT

## 2025-04-22 RX ORDER — LISDEXAMFETAMINE DIMESYLATE 40 MG/1
40 CAPSULE ORAL DAILY
Qty: 90 CAPSULE | Refills: 0 | Status: SHIPPED | OUTPATIENT
Start: 2025-04-22

## 2025-04-22 RX ORDER — DEXTROAMPHETAMINE SACCHARATE, AMPHETAMINE ASPARTATE, DEXTROAMPHETAMINE SULFATE AND AMPHETAMINE SULFATE 1.25; 1.25; 1.25; 1.25 MG/1; MG/1; MG/1; MG/1
5 TABLET ORAL
Qty: 90 TABLET | Refills: 0 | Status: SHIPPED | OUTPATIENT
Start: 2025-04-22

## 2025-05-07 ENCOUNTER — PATIENT MESSAGE (OUTPATIENT)
Dept: ALLERGY | Facility: CLINIC | Age: 30
End: 2025-05-07
Payer: COMMERCIAL

## 2025-05-22 ENCOUNTER — OFFICE VISIT (OUTPATIENT)
Dept: OBSTETRICS AND GYNECOLOGY | Facility: CLINIC | Age: 30
End: 2025-05-22
Payer: COMMERCIAL

## 2025-05-22 VITALS
DIASTOLIC BLOOD PRESSURE: 74 MMHG | BODY MASS INDEX: 20.18 KG/M2 | WEIGHT: 128.88 LBS | SYSTOLIC BLOOD PRESSURE: 108 MMHG

## 2025-05-22 DIAGNOSIS — L70.9 ACNE, UNSPECIFIED ACNE TYPE: ICD-10-CM

## 2025-05-22 DIAGNOSIS — F41.9 ANXIETY DUE TO INVASIVE PROCEDURE: ICD-10-CM

## 2025-05-22 DIAGNOSIS — Z01.419 ENCOUNTER FOR WELL WOMAN EXAM WITH ROUTINE GYNECOLOGICAL EXAM: Primary | ICD-10-CM

## 2025-05-22 DIAGNOSIS — N93.9 ABNORMAL UTERINE BLEEDING (AUB): ICD-10-CM

## 2025-05-22 DIAGNOSIS — Z12.4 CERVICAL CANCER SCREENING: ICD-10-CM

## 2025-05-22 LAB
B-HCG UR QL: NEGATIVE
CTP QC/QA: YES

## 2025-05-22 PROCEDURE — 99999 PR PBB SHADOW E&M-EST. PATIENT-LVL III: CPT | Mod: PBBFAC,,, | Performed by: OBSTETRICS & GYNECOLOGY

## 2025-05-22 RX ORDER — DIAZEPAM 5 MG/1
5 TABLET ORAL ONCE
Qty: 1 TABLET | Refills: 0 | Status: SHIPPED | OUTPATIENT
Start: 2025-05-22 | End: 2025-05-22

## 2025-05-22 RX ORDER — SPIRONOLACTONE 50 MG/1
50 TABLET, FILM COATED ORAL DAILY
Qty: 30 TABLET | Refills: 11 | Status: SHIPPED | OUTPATIENT
Start: 2025-05-22 | End: 2026-05-22

## 2025-05-22 NOTE — PROGRESS NOTES
Chief Complaint: Annual exam    Chief Complaint   Patient presents with    Well Woman       HPI:   30 y.o.  here today for annual exam. Denies any changes to health history since last visit. Has Mirena IUD in place since 2020. Starting to have more irregular bleeding as well as more significant cramping, worsening PMS/PMDD symptoms and acne. Having up to 9-10 days of bleeding, but very light, only using 1 tampon a day. Reports her cycles were previously monthly lasting 4-8 days, only using pantiliners. Patient denies abnormal breast symptoms. Maternal uncle with prostate cancer, paternal grandmother with bladder cancer, paternal uncle with kidney cancer, otherwise negative FH of breast, uterine, ovarian, or colon cancer. Patient denies vaginal discharge, itching, irritation, odor, abdominal pain, urinary complaints, or change in bowel habits. She is currently sexually active, monogamous male partner, engaged! She has been vaccinated against COVID-19. She received Gardasil.     UPT negative in clinic.      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     LMP Dates from Last 1 Encounters:   LMP: 2025       Labs / Significant Studies    Pap (): Normal per patient, needs     Past Medical History:   Diagnosis Date    Accessory navicular bone of foot     right    ADD (attention deficit disorder)     Cancer     Dysplastic nevus     Melanoma     Recurrent upper respiratory infection (URI)     Still's murmur     Urticaria      Past Surgical History:   Procedure Laterality Date    COLONOSCOPY N/A 2016    Procedure: COLONOSCOPY;  Surgeon: ROSY Ferguson MD;  Location: [address];  Service: Endoscopy;  Laterality: N/A;    WISDOM TOOTH EXTRACTION       Current Medications[1]  Review of patient's allergies indicates:  No Known Allergies  OB History    Para Term  AB Living   0 0 0 0 0 0   SAB IAB Ectopic  Multiple Live Births   0 0 0 0      Social History[2]  Family History   Problem Relation Name Age of Onset    Allergies Mother      Mitral valve prolapse Mother      Hyperlipidemia Mother      Osteoporosis Mother      Allergies Father Rich Mccann     Hyperlipidemia Father Rich Mccann     Allergies Brother      Allergies Brother      Stroke Maternal Aunt          maternal great aunt    Cirrhosis Paternal Uncle      Allergies Paternal Grandmother Jena Mccann     Cancer Paternal Grandmother Jena Mccann         Bladder cancer    Heart disease Maternal Grandmother Yumi [place]     Stroke Maternal Grandmother Yumi [place]     Vision loss Paternal Grandfather Joao Mccann         Retinoblastoma    Alcohol abuse Paternal Uncle Daja     Depression Brother Jayy Mccann     Diabetes Paternal Uncle Aurelio Mccann     Breast cancer Neg Hx      Colon cancer Neg Hx      Ovarian cancer Neg Hx      Hypertension Neg Hx      Melanoma Neg Hx      Esophageal cancer Neg Hx         Review of Systems   Negative except as in HPI     Physical Exam   Vitals:    05/22/25 1354   BP: 108/74     Body mass index is 20.18 kg/m².    Physical Exam  Constitutional:       General: She is not in acute distress.     Appearance: Normal appearance.     Genitourinary:    Vulva, vagina, uterus and urethral meatus normal.   The external female genitalia was normal.   No external genitalia lesions identified,Genitalia hair distrobution normal .   No vaginal discharge or bleeding in the vagina.    No vaginal prolapse present.     No vaginal atrophy present.  Right adnexum displays no mass, no tenderness and no fullness. Left adnexum displays no mass, no tenderness and no fullness. Cervix is normal. IUD strings visualized.Cervix exhibits no motion tenderness and no lesion. Uerus contour normal  Uterus is not tender and no mass.    Uterus is anteverted.   Breasts:     Right: No inverted nipple, mass, nipple discharge or skin change.      Left: No inverted nipple,  mass, nipple discharge or skin change.     HENT:      Head: Normocephalic and atraumatic.   Eyes:      Extraocular Movements: Extraocular movements intact.      Pupils: Pupils are equal, round, and reactive to light.   Neck:      Thyroid: No thyroid mass, thyromegaly or thyroid tenderness.   Pulmonary:      Effort: Pulmonary effort is normal.   Abdominal:      General: Abdomen is flat. There is no distension.      Palpations: Abdomen is soft.      Tenderness: There is no abdominal tenderness. There is no guarding or rebound.   Musculoskeletal:         General: Normal range of motion.      Cervical back: Normal range of motion.   Lymphadenopathy:      Cervical: No cervical adenopathy.      Upper Body:      Right upper body: No supraclavicular or axillary adenopathy.      Left upper body: No supraclavicular or axillary adenopathy.   Neurological:      General: No focal deficit present.      Mental Status: She is alert and oriented to person, place, and time.   Skin:     General: Skin is warm and dry.   Psychiatric:         Mood and Affect: Mood normal.         Behavior: Behavior normal.   Vitals reviewed.          ASSESSMENT:   Annual Well Women Exam  Problem List[3]  Health Maintenance Due   Topic Date Due    Pneumococcal Vaccines (Age 0-49) (1 of 2 - PCV) Never done    Pap Smear  05/09/2020    COVID-19 Vaccine (5 - 2024-25 season) 09/01/2024     Health Maintenance Topics with due status: Not Due       Topic Last Completion Date    TETANUS VACCINE 05/15/2018    RSV Vaccine (Age 60+ and Pregnant patients) Not Due       PLAN:  Problem List Items Addressed This Visit          Renal/    Encounter for well woman exam with routine gynecological exam - Primary    Current Assessment & Plan   Normal breast and pelvic exams except as noted. Pap collected, STD screening declined. Continue breast self awareness, recommend 30 minutes of exercise 5 times weekly.          Abnormal uterine bleeding (AUB)    Current Assessment &  Plan   Likely due to waning progesterone effects of IUD. Desires removal/reinsertion to help with bleeding and PMS/PMDD symptoms. Tried add-back therapy with OCPs in the past with no improvement in length of bleeding. Desires Valium for insertion, aware she must have a  to and from appointment. UPT negative in clinic.          Relevant Orders    Device Authorization Order    POCT Urine Pregnancy (Completed)     Other Visit Diagnoses         Cervical cancer screening        Relevant Orders    Liquid-Based Pap Smear, Screening      Acne, unspecified acne type        Relevant Medications    spironolactone (ALDACTONE) 50 MG tablet      Anxiety due to invasive procedure        Relevant Medications    diazePAM (VALIUM) 5 MG tablet            Follow up in about 1 year (around 5/22/2026) for Annual.       Bridget Harper MD  Department of Obstetrics & Gynecology  [facility]         [1]   Current Outpatient Medications:     azelastine (ASTELIN) 137 mcg (0.1 %) nasal spray, Use 1-2 sprays in each nostril twice daily, Disp: 30 mL, Rfl: 11    dextroamphetamine-amphetamine (ADDERALL) 5 mg Tab, Take 5 mg by mouth after lunch., Disp: 90 tablet, Rfl: 0    levocetirizine (XYZAL) 5 MG tablet, Take 1 tablet (5 mg total) by mouth every evening., Disp: 30 tablet, Rfl: 11    lisdexamfetamine (VYVANSE) 40 MG Cap, Take 1 capsule (40 mg total) by mouth once daily., Disp: 90 capsule, Rfl: 0    montelukast (SINGULAIR) 10 mg tablet, Take 1 tablet (10 mg total) by mouth every evening., Disp: 30 tablet, Rfl: 11    multivitamin (THERAGRAN) per tablet, Take 1 tablet by mouth once daily., Disp: , Rfl:     tretinoin (RETIN-A) 0.05 % cream, APPLY EXTERNALLY TO THE AFFECTED AREA EVERY NIGHT, Disp: 20 g, Rfl: 1    diazePAM (VALIUM) 5 MG tablet, Take 1 tablet (5 mg total) by mouth once. To be taken 30 minutes prior to your procedure for 1 dose, Disp: 1 tablet, Rfl: 0    norgestimate-ethinyl estradioL (ORTHO-CYCLEN) 0.25-35  mg-mcg per tablet, Take 1 tablet by mouth once daily. To be taken continuously, skip placebo pills (Patient not taking: Reported on 5/22/2025), Disp: 28 tablet, Rfl: 1    spironolactone (ALDACTONE) 50 MG tablet, Take 1 tablet (50 mg total) by mouth once daily., Disp: 30 tablet, Rfl: 11    Current Facility-Administered Medications:     levonorgestrel 17.5 mcg/24 hrs (5 yrs) 19.5 mg IUD 17.5 mcg, 17.5 mcg, Intrauterine, , Band, Marleni DIANA, DO, 17.5 mcg at 07/24/19 6778  [2]   Social History  Tobacco Use    Smoking status: Never    Smokeless tobacco: Never   Substance Use Topics    Alcohol use: Yes     Alcohol/week: 4.0 standard drinks of alcohol     Types: 4 Glasses of wine per week    Drug use: No   [3]   Patient Active Problem List  Diagnosis    Accessory navicular bone of foot    Dysmenorrhea in the adolescent    ADHD (attention deficit hyperactivity disorder), inattentive type    Hyperbilirubinemia    Liver hemangioma    Gilbert's syndrome    Encounter for well woman exam with routine gynecological exam    Irregular menstrual bleeding    Abnormal uterine bleeding (AUB)

## 2025-05-22 NOTE — ASSESSMENT & PLAN NOTE
Normal breast and pelvic exams except as noted. Pap collected, STD screening declined. Continue breast self awareness, recommend 30 minutes of exercise 5 times weekly.

## 2025-05-30 ENCOUNTER — RESULTS FOLLOW-UP (OUTPATIENT)
Dept: OBSTETRICS AND GYNECOLOGY | Facility: CLINIC | Age: 30
End: 2025-05-30

## 2025-06-05 ENCOUNTER — PROCEDURE VISIT (OUTPATIENT)
Dept: OBSTETRICS AND GYNECOLOGY | Facility: CLINIC | Age: 30
End: 2025-06-05
Payer: COMMERCIAL

## 2025-06-05 VITALS
DIASTOLIC BLOOD PRESSURE: 76 MMHG | SYSTOLIC BLOOD PRESSURE: 104 MMHG | WEIGHT: 127.88 LBS | BODY MASS INDEX: 20.03 KG/M2

## 2025-06-05 DIAGNOSIS — Z30.433 ENCOUNTER FOR IUD REMOVAL AND REINSERTION: Primary | ICD-10-CM

## 2025-06-05 LAB
B-HCG UR QL: NEGATIVE
CTP QC/QA: YES

## 2025-06-05 PROCEDURE — 58300 INSERT INTRAUTERINE DEVICE: CPT | Mod: S$GLB,,, | Performed by: OBSTETRICS & GYNECOLOGY

## 2025-06-05 PROCEDURE — 81025 URINE PREGNANCY TEST: CPT | Mod: S$GLB,,, | Performed by: OBSTETRICS & GYNECOLOGY

## 2025-06-05 PROCEDURE — 58301 REMOVE INTRAUTERINE DEVICE: CPT | Mod: S$GLB,,, | Performed by: OBSTETRICS & GYNECOLOGY

## 2025-06-17 ENCOUNTER — LAB VISIT (OUTPATIENT)
Dept: LAB | Facility: HOSPITAL | Age: 30
End: 2025-06-17
Attending: FAMILY MEDICINE
Payer: COMMERCIAL

## 2025-06-17 ENCOUNTER — RESULTS FOLLOW-UP (OUTPATIENT)
Dept: FAMILY MEDICINE | Facility: CLINIC | Age: 30
End: 2025-06-17

## 2025-06-17 DIAGNOSIS — Z00.00 ANNUAL PHYSICAL EXAM: ICD-10-CM

## 2025-06-17 LAB
ABSOLUTE EOSINOPHIL (OHS): 0.1 K/UL
ABSOLUTE MONOCYTE (OHS): 0.42 K/UL (ref 0.3–1)
ABSOLUTE NEUTROPHIL COUNT (OHS): 2.47 K/UL (ref 1.8–7.7)
ALBUMIN SERPL BCP-MCNC: 4.7 G/DL (ref 3.5–5.2)
ALP SERPL-CCNC: 70 UNIT/L (ref 40–150)
ALT SERPL W/O P-5'-P-CCNC: 10 UNIT/L (ref 10–44)
ANION GAP (OHS): 9 MMOL/L (ref 8–16)
AST SERPL-CCNC: 15 UNIT/L (ref 11–45)
BASOPHILS # BLD AUTO: 0.03 K/UL
BASOPHILS NFR BLD AUTO: 0.6 %
BILIRUB SERPL-MCNC: 3.1 MG/DL (ref 0.1–1)
BUN SERPL-MCNC: 11 MG/DL (ref 6–20)
CALCIUM SERPL-MCNC: 9.4 MG/DL (ref 8.7–10.5)
CHLORIDE SERPL-SCNC: 102 MMOL/L (ref 95–110)
CHOLEST SERPL-MCNC: 199 MG/DL (ref 120–199)
CHOLEST/HDLC SERPL: 3.3 {RATIO} (ref 2–5)
CO2 SERPL-SCNC: 28 MMOL/L (ref 23–29)
CREAT SERPL-MCNC: 0.6 MG/DL (ref 0.5–1.4)
EAG (OHS): 103 MG/DL (ref 68–131)
ERYTHROCYTE [DISTWIDTH] IN BLOOD BY AUTOMATED COUNT: 11.5 % (ref 11.5–14.5)
GFR SERPLBLD CREATININE-BSD FMLA CKD-EPI: >60 ML/MIN/1.73/M2
GLUCOSE SERPL-MCNC: 92 MG/DL (ref 70–110)
HBA1C MFR BLD: 5.2 % (ref 4–5.6)
HCT VFR BLD AUTO: 45.1 % (ref 37–48.5)
HDLC SERPL-MCNC: 61 MG/DL (ref 40–75)
HDLC SERPL: 30.7 % (ref 20–50)
HGB BLD-MCNC: 15 GM/DL (ref 12–16)
IMM GRANULOCYTES # BLD AUTO: 0.01 K/UL (ref 0–0.04)
IMM GRANULOCYTES NFR BLD AUTO: 0.2 % (ref 0–0.5)
LDLC SERPL CALC-MCNC: 118.8 MG/DL (ref 63–159)
LYMPHOCYTES # BLD AUTO: 1.93 K/UL (ref 1–4.8)
MCH RBC QN AUTO: 30.1 PG (ref 27–31)
MCHC RBC AUTO-ENTMCNC: 33.3 G/DL (ref 32–36)
MCV RBC AUTO: 91 FL (ref 82–98)
NONHDLC SERPL-MCNC: 138 MG/DL
NUCLEATED RBC (/100WBC) (OHS): 0 /100 WBC
PLATELET # BLD AUTO: 223 K/UL (ref 150–450)
PMV BLD AUTO: 10.9 FL (ref 9.2–12.9)
POTASSIUM SERPL-SCNC: 4.1 MMOL/L (ref 3.5–5.1)
PROT SERPL-MCNC: 7.3 GM/DL (ref 6–8.4)
RBC # BLD AUTO: 4.98 M/UL (ref 4–5.4)
RELATIVE EOSINOPHIL (OHS): 2 %
RELATIVE LYMPHOCYTE (OHS): 38.9 % (ref 18–48)
RELATIVE MONOCYTE (OHS): 8.5 % (ref 4–15)
RELATIVE NEUTROPHIL (OHS): 49.8 % (ref 38–73)
SODIUM SERPL-SCNC: 139 MMOL/L (ref 136–145)
TRIGL SERPL-MCNC: 96 MG/DL (ref 30–150)
WBC # BLD AUTO: 4.96 K/UL (ref 3.9–12.7)

## 2025-06-17 PROCEDURE — 36415 COLL VENOUS BLD VENIPUNCTURE: CPT | Mod: PO

## 2025-06-17 PROCEDURE — 83718 ASSAY OF LIPOPROTEIN: CPT

## 2025-06-17 PROCEDURE — 83036 HEMOGLOBIN GLYCOSYLATED A1C: CPT

## 2025-06-17 PROCEDURE — 82310 ASSAY OF CALCIUM: CPT

## 2025-06-17 PROCEDURE — 85025 COMPLETE CBC W/AUTO DIFF WBC: CPT

## 2025-06-20 ENCOUNTER — OFFICE VISIT (OUTPATIENT)
Dept: FAMILY MEDICINE | Facility: CLINIC | Age: 30
End: 2025-06-20
Payer: COMMERCIAL

## 2025-06-20 VITALS
SYSTOLIC BLOOD PRESSURE: 118 MMHG | TEMPERATURE: 98 F | OXYGEN SATURATION: 98 % | WEIGHT: 127.19 LBS | RESPIRATION RATE: 18 BRPM | HEART RATE: 94 BPM | BODY MASS INDEX: 19.96 KG/M2 | HEIGHT: 67 IN | DIASTOLIC BLOOD PRESSURE: 80 MMHG

## 2025-06-20 DIAGNOSIS — E80.4 GILBERT'S DISEASE: ICD-10-CM

## 2025-06-20 DIAGNOSIS — Z00.00 ANNUAL PHYSICAL EXAM: Primary | ICD-10-CM

## 2025-06-20 DIAGNOSIS — Z91.09 ENVIRONMENTAL ALLERGIES: ICD-10-CM

## 2025-06-20 DIAGNOSIS — F90.0 ATTENTION DEFICIT HYPERACTIVITY DISORDER (ADHD), PREDOMINANTLY INATTENTIVE TYPE: ICD-10-CM

## 2025-06-20 PROCEDURE — 99999 PR PBB SHADOW E&M-EST. PATIENT-LVL IV: CPT | Mod: PBBFAC,,, | Performed by: FAMILY MEDICINE

## 2025-06-20 NOTE — PROGRESS NOTES
Assessment & Plan:    Annual physical exam  Gilbert's disease  Labs reviewed and are within acceptable range. She has chronically elevated TB due to Gilbert's disease.     Attention deficit hyperactivity disorder (ADHD), predominantly inattentive type  Controlled. Continue current therapy.     Environmental allergies  Controlled. Continue current therapy.       Follow-up: Follow up in about 6 months (around 12/20/2025) for ADHD (virtual visit).  ______________________________________________________________________    Chief Complaint  Chief Complaint   Patient presents with    Health Maintenance       HPI  Melinda Mccann is a 30 y.o. female with medical diagnoses as listed in the medical history and problem list that presents to the office for an annual exam. Patient is in her usual state of health today and does not report any new concerns. ADHD symptoms in the afternoon have significantly improved with the afternoon dose of the Adderall that was added at her last visit in December.       Health Maintenance         Date Due Completion Date    Pneumococcal Vaccines (Age 0-49) (1 of 2 - PCV) Never done ---    COVID-19 Vaccine (5 - 2024-25 season) 09/01/2024 11/17/2022    TETANUS VACCINE 05/15/2028 5/15/2018    Pap Smear 05/22/2028 5/22/2025    RSV Vaccine (Age 60+ and Pregnant patients) (1 - 1-dose 75+ series) 04/08/2071 ---              PAST MEDICAL HISTORY:  Past Medical History:   Diagnosis Date    Accessory navicular bone of foot     right    ADD (attention deficit disorder)     Cancer     Dysplastic nevus     Melanoma     Recurrent upper respiratory infection (URI)     Still's murmur     Urticaria        PAST SURGICAL HISTORY:  Past Surgical History:   Procedure Laterality Date    COLONOSCOPY N/A 5/17/2016    Procedure: COLONOSCOPY;  Surgeon: ROSY Ferguson MD;  Location: [address];  Service: Endoscopy;  Laterality: N/A;    WISDOM TOOTH EXTRACTION         SOCIAL HISTORY:  Social  "History[1]    FAMILY HISTORY:  Family History   Problem Relation Name Age of Onset    Allergies Mother      Mitral valve prolapse Mother      Hyperlipidemia Mother      Osteoporosis Mother      Allergies Father Rich Mccann     Hyperlipidemia Father Rich Mccann     Allergies Brother      Allergies Brother      Stroke Maternal Aunt          maternal great aunt    Cirrhosis Paternal Uncle      Allergies Paternal Grandmother Jena Mccann     Cancer Paternal Grandmother Jena Mccann         Bladder cancer    Heart disease Maternal Grandmother Yumi Almonte     Stroke Maternal Grandmother Yumi Almonte     Vision loss Paternal Grandfather Joao Mccann         Retinoblastoma    Alcohol abuse Paternal Uncle Daja     Depression Brother Jayy Mccann     Diabetes Paternal Uncle Aurelio Mccann     Breast cancer Neg Hx      Colon cancer Neg Hx      Ovarian cancer Neg Hx      Hypertension Neg Hx      Melanoma Neg Hx      Esophageal cancer Neg Hx         ALLERGIES AND MEDICATIONS: updated and reviewed.  Review of patient's allergies indicates:  No Known Allergies  Current Medications[2]      ROS  Review of Systems   Constitutional:  Negative for activity change.   Psychiatric/Behavioral:  Negative for decreased concentration.            Physical Exam  Vitals:    06/20/25 1408   BP: 118/80   Patient Position: Sitting   Pulse: 94   Resp: 18   Temp: 98 °F (36.7 °C)   TempSrc: Oral   SpO2: 98%   Weight: 57.7 kg (127 lb 3.3 oz)   Height: 5' 7" (1.702 m)    Body mass index is 19.92 kg/m².  Weight: 57.7 kg (127 lb 3.3 oz)   Height: 5' 7" (170.2 cm)   Physical Exam  Constitutional:       General: She is not in acute distress.     Appearance: Normal appearance.   HENT:      Head: Normocephalic and atraumatic.   Neck:      Thyroid: No thyromegaly.   Cardiovascular:      Rate and Rhythm: Normal rate and regular rhythm.      Pulses: Normal pulses.      Heart sounds: Normal heart sounds.   Pulmonary:      Effort: Pulmonary effort is normal. No " respiratory distress.      Breath sounds: Normal breath sounds.   Musculoskeletal:      Cervical back: Neck supple.      Right lower leg: No edema.      Left lower leg: No edema.   Lymphadenopathy:      Cervical: No cervical adenopathy.   Skin:     General: Skin is warm and dry.      Findings: No rash.   Neurological:      General: No focal deficit present.      Mental Status: She is alert and oriented to person, place, and time.   Psychiatric:         Mood and Affect: Mood normal.         Behavior: Behavior normal.         Thought Content: Thought content normal.                  [1]   Social History  Socioeconomic History    Marital status: Single   Occupational History    Occupation: [employer]   Tobacco Use    Smoking status: Never    Smokeless tobacco: Never   Substance and Sexual Activity    Alcohol use: Yes     Alcohol/week: 4.0 standard drinks of alcohol     Types: 4 Glasses of wine per week    Drug use: No    Sexual activity: Yes     Partners: Male     Birth control/protection: Implant   Other Topics Concern    Are you pregnant or think you may be? No    Breast-feeding No     Social Drivers of Health     Financial Resource Strain: Low Risk  (11/26/2024)    Overall Financial Resource Strain (CARDIA)     Difficulty of Paying Living Expenses: Not very hard   Food Insecurity: No Food Insecurity (11/26/2024)    Hunger Vital Sign     Worried About Running Out of Food in the Last Year: Never true     Ran Out of Food in the Last Year: Never true   Physical Activity: Sufficiently Active (11/26/2024)    Exercise Vital Sign     Days of Exercise per Week: 5 days     Minutes of Exercise per Session: 40 min   Stress: Stress Concern Present (11/26/2024)    [employer] - Occupational Stress Questionnaire     Feeling of Stress : To some extent   Housing Stability: Unknown (11/26/2024)    Housing Stability Vital Sign     Unable to Pay for Housing in the Last Year: No   [2]   Current  Outpatient Medications   Medication Sig Dispense Refill    azelastine (ASTELIN) 137 mcg (0.1 %) nasal spray Use 1-2 sprays in each nostril twice daily 30 mL 11    dextroamphetamine-amphetamine (ADDERALL) 5 mg Tab Take 5 mg by mouth after lunch. 90 tablet 0    levocetirizine (XYZAL) 5 MG tablet Take 1 tablet (5 mg total) by mouth every evening. 30 tablet 11    lisdexamfetamine (VYVANSE) 40 MG Cap Take 1 capsule (40 mg total) by mouth once daily. 90 capsule 0    multivitamin (THERAGRAN) per tablet Take 1 tablet by mouth once daily.      spironolactone (ALDACTONE) 50 MG tablet Take 1 tablet (50 mg total) by mouth once daily. 30 tablet 11    tretinoin (RETIN-A) 0.05 % cream APPLY EXTERNALLY TO THE AFFECTED AREA EVERY NIGHT 20 g 1     Current Facility-Administered Medications   Medication Dose Route Frequency Provider Last Rate Last Admin    levonorgestreL (Kyleena) 19.5 mg IUD 17.5 mcg  17.5 mcg Intrauterine     17.5 mcg at 06/05/25 1430    levonorgestrel 17.5 mcg/24 hrs (5 yrs) 19.5 mg IUD 17.5 mcg  17.5 mcg Intrauterine  Marleni Amras DO   17.5 mcg at 07/24/19 7029

## 2025-07-10 ENCOUNTER — OFFICE VISIT (OUTPATIENT)
Dept: PODIATRY | Facility: CLINIC | Age: 30
End: 2025-07-10
Payer: COMMERCIAL

## 2025-07-10 VITALS
HEART RATE: 79 BPM | HEIGHT: 67 IN | WEIGHT: 127.19 LBS | BODY MASS INDEX: 19.96 KG/M2 | DIASTOLIC BLOOD PRESSURE: 78 MMHG | SYSTOLIC BLOOD PRESSURE: 113 MMHG

## 2025-07-10 DIAGNOSIS — M79.674 TOE PAIN, RIGHT: ICD-10-CM

## 2025-07-10 DIAGNOSIS — L60.0 INGROWN NAIL: Primary | ICD-10-CM

## 2025-07-10 DIAGNOSIS — L03.031 PARONYCHIA, TOE, RIGHT: ICD-10-CM

## 2025-07-10 PROCEDURE — 99999 PR PBB SHADOW E&M-EST. PATIENT-LVL III: CPT | Mod: PBBFAC,,, | Performed by: PODIATRIST

## 2025-07-10 PROCEDURE — 3008F BODY MASS INDEX DOCD: CPT | Mod: CPTII,S$GLB,, | Performed by: PODIATRIST

## 2025-07-10 PROCEDURE — 3078F DIAST BP <80 MM HG: CPT | Mod: CPTII,S$GLB,, | Performed by: PODIATRIST

## 2025-07-10 PROCEDURE — 1159F MED LIST DOCD IN RCRD: CPT | Mod: CPTII,S$GLB,, | Performed by: PODIATRIST

## 2025-07-10 PROCEDURE — 3044F HG A1C LEVEL LT 7.0%: CPT | Mod: CPTII,S$GLB,, | Performed by: PODIATRIST

## 2025-07-10 PROCEDURE — 99214 OFFICE O/P EST MOD 30 MIN: CPT | Mod: S$GLB,,, | Performed by: PODIATRIST

## 2025-07-10 PROCEDURE — 3074F SYST BP LT 130 MM HG: CPT | Mod: CPTII,S$GLB,, | Performed by: PODIATRIST

## 2025-07-10 RX ORDER — TOBRAMYCIN 3 MG/ML
SOLUTION/ DROPS OPHTHALMIC
Qty: 5 ML | Refills: 3 | Status: SHIPPED | OUTPATIENT
Start: 2025-07-10

## 2025-07-10 NOTE — PROGRESS NOTES
Subjective:      Patient ID: Melinda Mccann is a 30 y.o. female.    Chief Complaint: Ingrown Toenail (R great toe)    Sharp, throbbing pain right and left big toes/nails.  Chronic condition present off and on with worsening improvement in periods over time for the past several years.  Aggravated by sometimes by socks shoes pressure ambulation.  Trimming and topical antibiotics helped pretty well a few weeks ago but did not last too long in terms of relief.  Patient denies trauma and surgery both feet.  She used currently using topical ciclopirox and Lac-Hydrin under the direction of Dermatology with good improvement noted.    Review of Systems   Constitutional: Negative for chills, diaphoresis, fever, malaise/fatigue and night sweats.   Cardiovascular:  Negative for claudication, cyanosis, leg swelling and syncope.   Skin:  Positive for nail changes. Negative for color change, dry skin, rash, suspicious lesions and unusual hair distribution.   Musculoskeletal:  Negative for falls, joint pain, joint swelling, muscle cramps, muscle weakness and stiffness.   Gastrointestinal:  Negative for constipation, diarrhea, nausea and vomiting.   Neurological:  Negative for brief paralysis, disturbances in coordination, focal weakness, numbness, paresthesias, sensory change and tremors.           Objective:      Physical Exam  Constitutional:       General: She is not in acute distress.     Appearance: She is well-developed. She is not diaphoretic.   Cardiovascular:      Pulses:           Popliteal pulses are 2+ on the right side and 2+ on the left side.        Dorsalis pedis pulses are 2+ on the right side and 2+ on the left side.        Posterior tibial pulses are 2+ on the right side and 2+ on the left side.      Comments: Capillary refill 3 seconds all toes/distal feet, all toes/both feet warm to touch.      Negative lymphadenopathy bilateral popliteal fossa and tarsal tunnel.      Negavie lower extremity edema  bilateral.    Musculoskeletal:      Right ankle: No swelling, deformity, ecchymosis or lacerations. Normal range of motion. Normal pulse.      Right Achilles Tendon: Normal. No defects. Nelson's test negative.   Lymphadenopathy:      Lower Body: No right inguinal adenopathy. No left inguinal adenopathy.      Comments: Negative lymphadenopathy bilateral popliteal fossa and tarsal tunnel.    Negative lymphangitic streaking bilateral feet/ankles/legs.   Skin:     General: Skin is warm and dry.      Capillary Refill: Capillary refill takes 2 to 3 seconds.      Coloration: Skin is not pale.      Findings: No abrasion, bruising, burn, ecchymosis, erythema, laceration, lesion or rash.      Nails: There is no clubbing.      Comments: Tenderness to palpation with visible and palpable incurvation nail at the medial border of the right hallux toenail without open skin drainage pus tracking fluctuance malodor cardinal signs of infection of the periungual skin.      Otherwise Skin is normal age and health appropriate color, turgor, texture, and temperature bilateral lower extremities without ulceration, hyperpigmentation, discoloration, masses nodules or cords palpated.  No ecchymosis, erythema, edema, or cardinal signs of infection bilateral lower extremities.      Neurological:      Mental Status: She is alert and oriented to person, place, and time.      Sensory: No sensory deficit.      Motor: No tremor, atrophy or abnormal muscle tone.      Gait: Gait normal.   Psychiatric:         Behavior: Behavior is cooperative.             Assessment:       No diagnosis found.        Plan:       There are no diagnoses linked to this encounter.    I counseled the patient on her conditions, their implications and medical management.    Topical tobramycin drops twice daily right hallux.      Cover right hallux all times with Band-Aid or similar changing daily.      Discussed conservative treatment with shoes of adequate dimensions,  material, and style to alleviate symptoms and delay or prevent surgical intervention.     Utilizing sterile toenail clippers I aggressively debrided the offending nail border approximately 3 mm from its edge and carried the nail plate incision down at an angle in order to wedge out the offending cryptotic portion of the nail plate. The offending border was then removed in toto. The area was cleansed with alcohol. Patient tolerated the procedure well and related significant relief.          No follow-ups on file.

## 2025-07-15 ENCOUNTER — OFFICE VISIT (OUTPATIENT)
Dept: OBSTETRICS AND GYNECOLOGY | Facility: CLINIC | Age: 30
End: 2025-07-15
Payer: COMMERCIAL

## 2025-07-15 VITALS — SYSTOLIC BLOOD PRESSURE: 116 MMHG | WEIGHT: 132.19 LBS | DIASTOLIC BLOOD PRESSURE: 82 MMHG | BODY MASS INDEX: 20.7 KG/M2

## 2025-07-15 DIAGNOSIS — L70.9 ACNE, UNSPECIFIED ACNE TYPE: ICD-10-CM

## 2025-07-15 DIAGNOSIS — Z30.431 ENCOUNTER FOR ROUTINE CHECKING OF INTRAUTERINE CONTRACEPTIVE DEVICE (IUD): Primary | ICD-10-CM

## 2025-07-15 PROBLEM — Z01.419 ENCOUNTER FOR WELL WOMAN EXAM WITH ROUTINE GYNECOLOGICAL EXAM: Status: RESOLVED | Noted: 2023-11-13 | Resolved: 2025-07-15

## 2025-07-15 PROBLEM — N92.6 IRREGULAR MENSTRUAL BLEEDING: Status: RESOLVED | Noted: 2024-07-05 | Resolved: 2025-07-15

## 2025-07-15 PROBLEM — N93.9 ABNORMAL UTERINE BLEEDING (AUB): Status: RESOLVED | Noted: 2025-05-22 | Resolved: 2025-07-15

## 2025-07-15 PROCEDURE — 3044F HG A1C LEVEL LT 7.0%: CPT | Mod: CPTII,S$GLB,, | Performed by: OBSTETRICS & GYNECOLOGY

## 2025-07-15 PROCEDURE — 99214 OFFICE O/P EST MOD 30 MIN: CPT | Mod: S$GLB,,, | Performed by: OBSTETRICS & GYNECOLOGY

## 2025-07-15 PROCEDURE — 3008F BODY MASS INDEX DOCD: CPT | Mod: CPTII,S$GLB,, | Performed by: OBSTETRICS & GYNECOLOGY

## 2025-07-15 PROCEDURE — 3079F DIAST BP 80-89 MM HG: CPT | Mod: CPTII,S$GLB,, | Performed by: OBSTETRICS & GYNECOLOGY

## 2025-07-15 PROCEDURE — 1160F RVW MEDS BY RX/DR IN RCRD: CPT | Mod: CPTII,S$GLB,, | Performed by: OBSTETRICS & GYNECOLOGY

## 2025-07-15 PROCEDURE — 3074F SYST BP LT 130 MM HG: CPT | Mod: CPTII,S$GLB,, | Performed by: OBSTETRICS & GYNECOLOGY

## 2025-07-15 PROCEDURE — 1159F MED LIST DOCD IN RCRD: CPT | Mod: CPTII,S$GLB,, | Performed by: OBSTETRICS & GYNECOLOGY

## 2025-07-15 PROCEDURE — 99999 PR PBB SHADOW E&M-EST. PATIENT-LVL III: CPT | Mod: PBBFAC,,, | Performed by: OBSTETRICS & GYNECOLOGY

## 2025-07-15 RX ORDER — SPIRONOLACTONE 100 MG/1
100 TABLET, FILM COATED ORAL DAILY
Qty: 30 TABLET | Refills: 11 | Status: SHIPPED | OUTPATIENT
Start: 2025-07-15 | End: 2026-07-15

## 2025-07-15 NOTE — PROGRESS NOTES
Chief Complaint   Patient presents with    string check       HPI:   30 y.o.  here today for 6 week string check. She is s/p Kyleena placement by me 25. Initially was supposed to have Mirena removal and replacement, but after removal of Mirena unable to insert new device despite os finder and sound able to pass through internal os. No spinal needles available at [facility] to perform paracervical block. She has had intermittent bleeding/spotting since insertion. Now mostly brown old blood. Had some cramping for the first two weeks after insertion and again at the start of this bleeding episode (which correlates to when her cycle was due to start), but denies continued pelvic pain or abnormal discharge. No string complaints today and otherwise feeling well.     Labs / Significant Studies  Pap (2025): NILM     Past Medical History:   Diagnosis Date    Accessory navicular bone of foot     right    ADD (attention deficit disorder)     Cancer     Dysplastic nevus     Melanoma     Recurrent upper respiratory infection (URI)     Still's murmur     Urticaria      Past Surgical History:   Procedure Laterality Date    COLONOSCOPY N/A 2016    Procedure: COLONOSCOPY;  Surgeon: ROSY Ferguson MD;  Location: [facility] ([address] [place] [address]);  Service: Endoscopy;  Laterality: N/A;    WISDOM TOOTH EXTRACTION       Current Medications[1]  Review of patient's allergies indicates:  No Known Allergies  OB History    Para Term  AB Living   0 0 0 0 0 0   SAB IAB Ectopic Multiple Live Births   0 0 0 0      Social History[2]  Family History   Problem Relation Name Age of Onset    Allergies Mother      Mitral valve prolapse Mother      Hyperlipidemia Mother      Osteoporosis Mother      Allergies Father Rich Ramy     Hyperlipidemia Father Rich Ramy     Allergies Brother      Allergies Brother      Stroke Maternal Aunt          maternal great aunt    Cirrhosis Paternal Uncle      Allergies Paternal Grandmother  Jena Mccann     Cancer Paternal Grandmother Jena Mccann         Bladder cancer    Heart disease Maternal Grandmother Yumi Almonte     Stroke Maternal Grandmother Yumi Almonte     Vision loss Paternal Grandfather Joao Mccann         Retinoblastoma    Alcohol abuse Paternal Uncle Daja     Depression Brother Jayy Mccann     Diabetes Paternal Uncle Aurelio Mccann     Breast cancer Neg Hx      Colon cancer Neg Hx      Ovarian cancer Neg Hx      Hypertension Neg Hx      Melanoma Neg Hx      Esophageal cancer Neg Hx         Review of Systems   Negative except as in HPI    Physical Exam   Vitals:    07/15/25 0908   BP: 116/82     Body mass index is 20.7 kg/m².    Physical Exam  Constitutional:       General: She is not in acute distress.     Appearance: Normal appearance.     Genitourinary:    Vulva, urethra and uterus normal.   The external female genitalia was normal.   No external genitalia lesions identified,Genitalia hair distrobution normal .   There is bleeding (scant dark brown blood in vault) in the vagina. No vaginal discharge in the vagina.    No vaginal prolapse present.     No vaginal atrophy present.  Right adnexum displays no mass, no tenderness and no fullness. Left adnexum displays no mass, no tenderness and no fullness. Cervix is normal. IUD strings visualized.Cervix exhibits no motion tenderness and no lesion. Uterus consistancy normal and Uerus contour normal  Uterus is not tender and no mass.    Uterus is anteverted.     HENT:      Head: Normocephalic and atraumatic.   Eyes:      Extraocular Movements: Extraocular movements intact.      Pupils: Pupils are equal, round, and reactive to light.   Pulmonary:      Effort: Pulmonary effort is normal.   Abdominal:      General: Abdomen is flat. There is no distension.      Palpations: Abdomen is soft.      Tenderness: There is no abdominal tenderness. There is no guarding or rebound.   Musculoskeletal:         General: Normal range of motion.      Cervical back:  Normal range of motion.   Neurological:      General: No focal deficit present.      Mental Status: She is alert and oriented to person, place, and time.   Skin:     General: Skin is warm and dry.   Psychiatric:         Mood and Affect: Mood normal.         Behavior: Behavior normal.         Thought Content: Thought content normal.   Vitals reviewed.        Labs reviewed: CBC, Pap, UPT    ASSESSMENT:   Problem List[3]    PLAN:  Problem List Items Addressed This Visit          Renal/    Encounter for routine checking of intrauterine contraceptive device (IUD) - Primary    Current Assessment & Plan   Doing well today with no complaints. Strings visualized and palpated. IUD in correct position. Counseled about possibility of amenorrhea/irregular bleeding, and to reach out to the office with continued bothersome irregular bleeding or pelvic pain. Patient voiced understanding.          Other Visit Diagnoses         Acne, unspecified acne type        Relevant Medications    spironolactone (ALDACTONE) 100 MG tablet             Total time spent on this encounter was 12 minutes.  This includes preparing to see the patient;  obtaining/reviewing separately obtained history;  performing a medical exam and/or evaluation;   counseling/educating the patient;  ordering medications, tests, of procedures;   referring/communicating with other health care professionals;  EMR documentation;  interpreting/communicating results to the patient;  and/or care coordination.    Follow up if symptoms worsen or fail to improve.       Bridget Harper MD  Department of Obstetrics & Gynecology  [facility]         [1]   Current Outpatient Medications:     azelastine (ASTELIN) 137 mcg (0.1 %) nasal spray, Use 1-2 sprays in each nostril twice daily, Disp: 30 mL, Rfl: 11    dextroamphetamine-amphetamine (ADDERALL) 5 mg Tab, Take 5 mg by mouth after lunch., Disp: 90 tablet, Rfl: 0    levocetirizine (XYZAL) 5 MG tablet, Take 1 tablet (5  mg total) by mouth every evening., Disp: 30 tablet, Rfl: 11    lisdexamfetamine (VYVANSE) 40 MG Cap, Take 1 capsule (40 mg total) by mouth once daily., Disp: 90 capsule, Rfl: 0    multivitamin (THERAGRAN) per tablet, Take 1 tablet by mouth once daily., Disp: , Rfl:     spironolactone (ALDACTONE) 100 MG tablet, Take 1 tablet (100 mg total) by mouth once daily., Disp: 30 tablet, Rfl: 11    tobramycin sulfate 0.3% (TOBREX) 0.3 % ophthalmic solution, 1-2 drops topically twice daily to affected toe(s)., Disp: 5 mL, Rfl: 3    tretinoin (RETIN-A) 0.05 % cream, APPLY EXTERNALLY TO THE AFFECTED AREA EVERY NIGHT, Disp: 20 g, Rfl: 1    Current Facility-Administered Medications:     levonorgestreL (Kyleena) 19.5 mg IUD 17.5 mcg, 17.5 mcg, Intrauterine, , , 17.5 mcg at 06/05/25 1430    levonorgestrel 17.5 mcg/24 hrs (5 yrs) 19.5 mg IUD 17.5 mcg, 17.5 mcg, Intrauterine, , Band, Marleni W., DO, 17.5 mcg at 07/24/19 1358  [2]   Social History  Tobacco Use    Smoking status: Never    Smokeless tobacco: Never   Substance Use Topics    Alcohol use: Yes     Alcohol/week: 4.0 standard drinks of alcohol     Types: 4 Glasses of wine per week    Drug use: No   [3]   Patient Active Problem List  Diagnosis    Accessory navicular bone of foot    Dysmenorrhea in the adolescent    ADHD (attention deficit hyperactivity disorder), inattentive type    Hyperbilirubinemia    Liver hemangioma    Gilbert's syndrome    Encounter for routine checking of intrauterine contraceptive device (IUD)

## 2025-07-15 NOTE — ASSESSMENT & PLAN NOTE
Doing well today with no complaints. Strings visualized and palpated. IUD in correct position. Counseled about possibility of amenorrhea/irregular bleeding, and to reach out to the office with continued bothersome irregular bleeding or pelvic pain. Patient voiced understanding.

## 2025-07-21 DIAGNOSIS — F90.0 ATTENTION DEFICIT HYPERACTIVITY DISORDER (ADHD), PREDOMINANTLY INATTENTIVE TYPE: ICD-10-CM

## 2025-07-21 RX ORDER — LISDEXAMFETAMINE DIMESYLATE 40 MG/1
40 CAPSULE ORAL DAILY
Qty: 90 CAPSULE | Refills: 0 | Status: SHIPPED | OUTPATIENT
Start: 2025-07-21

## 2025-07-21 RX ORDER — DEXTROAMPHETAMINE SACCHARATE, AMPHETAMINE ASPARTATE, DEXTROAMPHETAMINE SULFATE AND AMPHETAMINE SULFATE 1.25; 1.25; 1.25; 1.25 MG/1; MG/1; MG/1; MG/1
5 TABLET ORAL
Qty: 90 TABLET | Refills: 0 | Status: SHIPPED | OUTPATIENT
Start: 2025-07-21

## 2025-07-21 NOTE — TELEPHONE ENCOUNTER
No care due was identified.  [facility] Embedded Care Due Messages. Reference number: [id number].   7/21/2025 11:50:06 AM CDT